# Patient Record
Sex: MALE | Race: WHITE | Employment: OTHER | URBAN - METROPOLITAN AREA
[De-identification: names, ages, dates, MRNs, and addresses within clinical notes are randomized per-mention and may not be internally consistent; named-entity substitution may affect disease eponyms.]

---

## 2017-01-18 ENCOUNTER — OFFICE VISIT (OUTPATIENT)
Dept: INTERNAL MEDICINE | Age: 77
End: 2017-01-18

## 2017-01-18 VITALS
RESPIRATION RATE: 14 BRPM | HEIGHT: 66 IN | DIASTOLIC BLOOD PRESSURE: 82 MMHG | SYSTOLIC BLOOD PRESSURE: 132 MMHG | HEART RATE: 66 BPM | WEIGHT: 221.2 LBS | BODY MASS INDEX: 35.55 KG/M2

## 2017-01-18 DIAGNOSIS — N40.1 BENIGN NON-NODULAR PROSTATIC HYPERPLASIA WITH LOWER URINARY TRACT SYMPTOMS: ICD-10-CM

## 2017-01-18 DIAGNOSIS — I10 ESSENTIAL HYPERTENSION: Primary | ICD-10-CM

## 2017-01-18 DIAGNOSIS — R73.9 HYPERGLYCEMIA: ICD-10-CM

## 2017-01-18 DIAGNOSIS — R07.9 CHEST PAIN, UNSPECIFIED TYPE: ICD-10-CM

## 2017-01-18 DIAGNOSIS — I25.10 ATHEROSCLEROSIS OF NATIVE CORONARY ARTERY OF NATIVE HEART WITHOUT ANGINA PECTORIS: ICD-10-CM

## 2017-01-18 DIAGNOSIS — E78.5 HYPERLIPIDEMIA, UNSPECIFIED HYPERLIPIDEMIA TYPE: ICD-10-CM

## 2017-01-18 DIAGNOSIS — R35.1 NOCTURIA: ICD-10-CM

## 2017-01-18 DIAGNOSIS — E55.9 VITAMIN D DEFICIENCY: ICD-10-CM

## 2017-01-18 DIAGNOSIS — M1A.9XX0 CHRONIC GOUT WITHOUT TOPHUS, UNSPECIFIED CAUSE, UNSPECIFIED SITE: ICD-10-CM

## 2017-01-18 PROCEDURE — 99214 OFFICE O/P EST MOD 30 MIN: CPT | Performed by: INTERNAL MEDICINE

## 2017-01-18 RX ORDER — LISINOPRIL 20 MG/1
20 TABLET ORAL DAILY
Qty: 90 TABLET | Refills: 1 | Status: SHIPPED | OUTPATIENT
Start: 2017-01-18 | End: 2017-08-28 | Stop reason: SDUPTHER

## 2017-01-18 RX ORDER — ISOSORBIDE DINITRATE 30 MG/1
30 TABLET ORAL 2 TIMES DAILY
Qty: 180 TABLET | Refills: 1 | Status: SHIPPED | OUTPATIENT
Start: 2017-01-18 | End: 2017-08-28 | Stop reason: SDUPTHER

## 2017-01-18 RX ORDER — ZOLPIDEM TARTRATE 10 MG/1
10 TABLET ORAL NIGHTLY PRN
Qty: 90 TABLET | Refills: 0 | Status: SHIPPED | OUTPATIENT
Start: 2017-01-18 | End: 2017-12-27 | Stop reason: SDUPTHER

## 2017-01-18 RX ORDER — ALLOPURINOL 300 MG/1
TABLET ORAL
Qty: 90 TABLET | Refills: 1 | Status: SHIPPED | OUTPATIENT
Start: 2017-01-18 | End: 2017-08-28 | Stop reason: SDUPTHER

## 2017-01-18 RX ORDER — HYDROCODONE BITARTRATE AND ACETAMINOPHEN 5; 325 MG/1; MG/1
1 TABLET ORAL EVERY 6 HOURS PRN
Qty: 30 TABLET | Refills: 0 | Status: SHIPPED | OUTPATIENT
Start: 2017-01-18 | End: 2017-05-16 | Stop reason: SDUPTHER

## 2017-01-18 RX ORDER — AMLODIPINE BESYLATE 5 MG/1
TABLET ORAL
Qty: 90 TABLET | Refills: 1 | Status: SHIPPED | OUTPATIENT
Start: 2017-01-18 | End: 2017-06-12 | Stop reason: SDUPTHER

## 2017-01-18 ASSESSMENT — ENCOUNTER SYMPTOMS
EYES NEGATIVE: 1
GASTROINTESTINAL NEGATIVE: 1
ALLERGIC/IMMUNOLOGIC NEGATIVE: 1
RESPIRATORY NEGATIVE: 1

## 2017-05-10 LAB
A/G RATIO: 1.9 (ref 1.1–2.2)
ALBUMIN SERPL-MCNC: 4.4 G/DL (ref 3.4–5)
ALP BLD-CCNC: 50 U/L (ref 40–129)
ALT SERPL-CCNC: 18 U/L (ref 10–40)
ANION GAP SERPL CALCULATED.3IONS-SCNC: 14 MMOL/L (ref 3–16)
AST SERPL-CCNC: 15 U/L (ref 15–37)
BASOPHILS ABSOLUTE: 0 K/UL (ref 0–0.2)
BASOPHILS RELATIVE PERCENT: 0.2 %
BILIRUB SERPL-MCNC: 0.6 MG/DL (ref 0–1)
BUN BLDV-MCNC: 18 MG/DL (ref 7–20)
CALCIUM SERPL-MCNC: 9.3 MG/DL (ref 8.3–10.6)
CHLORIDE BLD-SCNC: 104 MMOL/L (ref 99–110)
CHOLESTEROL, TOTAL: 239 MG/DL (ref 0–199)
CO2: 24 MMOL/L (ref 21–32)
CREAT SERPL-MCNC: 1.2 MG/DL (ref 0.8–1.3)
EOSINOPHILS ABSOLUTE: 0.2 K/UL (ref 0–0.6)
EOSINOPHILS RELATIVE PERCENT: 2.3 %
GFR AFRICAN AMERICAN: >60
GFR NON-AFRICAN AMERICAN: 59
GLOBULIN: 2.3 G/DL
GLUCOSE BLD-MCNC: 98 MG/DL (ref 70–99)
HCT VFR BLD CALC: 46.5 % (ref 40.5–52.5)
HDLC SERPL-MCNC: 35 MG/DL (ref 40–60)
HEMOGLOBIN: 15.1 G/DL (ref 13.5–17.5)
LDL CHOLESTEROL CALCULATED: 161 MG/DL
LYMPHOCYTES ABSOLUTE: 1.2 K/UL (ref 1–5.1)
LYMPHOCYTES RELATIVE PERCENT: 18.2 %
MCH RBC QN AUTO: 28.9 PG (ref 26–34)
MCHC RBC AUTO-ENTMCNC: 32.4 G/DL (ref 31–36)
MCV RBC AUTO: 89 FL (ref 80–100)
MONOCYTES ABSOLUTE: 0.7 K/UL (ref 0–1.3)
MONOCYTES RELATIVE PERCENT: 9.7 %
NEUTROPHILS ABSOLUTE: 4.7 K/UL (ref 1.7–7.7)
NEUTROPHILS RELATIVE PERCENT: 69.6 %
PDW BLD-RTO: 15 % (ref 12.4–15.4)
PLATELET # BLD: 194 K/UL (ref 135–450)
PMV BLD AUTO: 8.9 FL (ref 5–10.5)
POTASSIUM SERPL-SCNC: 5 MMOL/L (ref 3.5–5.1)
PROSTATE SPECIFIC ANTIGEN: 2.07 NG/ML (ref 0–4)
RBC # BLD: 5.22 M/UL (ref 4.2–5.9)
SODIUM BLD-SCNC: 142 MMOL/L (ref 136–145)
TOTAL PROTEIN: 6.7 G/DL (ref 6.4–8.2)
TRIGL SERPL-MCNC: 216 MG/DL (ref 0–150)
TSH SERPL DL<=0.05 MIU/L-ACNC: 2.4 UIU/ML (ref 0.27–4.2)
URIC ACID, SERUM: 8.3 MG/DL (ref 3.5–7.2)
VITAMIN D 25-HYDROXY: 27.1 NG/ML
VLDLC SERPL CALC-MCNC: 43 MG/DL
WBC # BLD: 6.8 K/UL (ref 4–11)

## 2017-05-11 LAB
ESTIMATED AVERAGE GLUCOSE: 119.8 MG/DL
HBA1C MFR BLD: 5.8 %

## 2017-05-15 ENCOUNTER — TELEPHONE (OUTPATIENT)
Dept: INTERNAL MEDICINE | Age: 77
End: 2017-05-15

## 2017-05-16 ENCOUNTER — OFFICE VISIT (OUTPATIENT)
Dept: INTERNAL MEDICINE | Age: 77
End: 2017-05-16

## 2017-05-16 VITALS
HEIGHT: 66 IN | DIASTOLIC BLOOD PRESSURE: 64 MMHG | WEIGHT: 221.2 LBS | SYSTOLIC BLOOD PRESSURE: 124 MMHG | RESPIRATION RATE: 12 BRPM | BODY MASS INDEX: 35.55 KG/M2 | HEART RATE: 62 BPM

## 2017-05-16 DIAGNOSIS — M1A.9XX0 CHRONIC GOUT WITHOUT TOPHUS, UNSPECIFIED CAUSE, UNSPECIFIED SITE: ICD-10-CM

## 2017-05-16 DIAGNOSIS — E78.5 HYPERLIPIDEMIA, UNSPECIFIED HYPERLIPIDEMIA TYPE: ICD-10-CM

## 2017-05-16 DIAGNOSIS — I25.10 ATHEROSCLEROSIS OF NATIVE CORONARY ARTERY OF NATIVE HEART WITHOUT ANGINA PECTORIS: ICD-10-CM

## 2017-05-16 DIAGNOSIS — N40.1 BENIGN NON-NODULAR PROSTATIC HYPERPLASIA WITH LOWER URINARY TRACT SYMPTOMS: ICD-10-CM

## 2017-05-16 DIAGNOSIS — R73.9 HYPERGLYCEMIA: ICD-10-CM

## 2017-05-16 DIAGNOSIS — E55.9 UNSPECIFIED VITAMIN D DEFICIENCY: Primary | ICD-10-CM

## 2017-05-16 PROCEDURE — 99214 OFFICE O/P EST MOD 30 MIN: CPT | Performed by: INTERNAL MEDICINE

## 2017-05-16 RX ORDER — HYDROCODONE BITARTRATE AND ACETAMINOPHEN 5; 325 MG/1; MG/1
1 TABLET ORAL EVERY 6 HOURS PRN
Qty: 30 TABLET | Refills: 0 | Status: SHIPPED | OUTPATIENT
Start: 2017-05-16 | End: 2017-12-27 | Stop reason: SDUPTHER

## 2017-05-16 ASSESSMENT — ENCOUNTER SYMPTOMS
ALLERGIC/IMMUNOLOGIC NEGATIVE: 1
EYES NEGATIVE: 1
RESPIRATORY NEGATIVE: 1
GASTROINTESTINAL NEGATIVE: 1

## 2017-06-13 RX ORDER — AMLODIPINE BESYLATE 5 MG/1
TABLET ORAL
Qty: 90 TABLET | Refills: 0 | Status: SHIPPED | OUTPATIENT
Start: 2017-06-13 | End: 2017-08-28 | Stop reason: SDUPTHER

## 2017-08-18 DIAGNOSIS — E55.9 UNSPECIFIED VITAMIN D DEFICIENCY: ICD-10-CM

## 2017-08-18 DIAGNOSIS — M1A.9XX0 CHRONIC GOUT WITHOUT TOPHUS, UNSPECIFIED CAUSE, UNSPECIFIED SITE: ICD-10-CM

## 2017-08-18 DIAGNOSIS — R73.9 HYPERGLYCEMIA: ICD-10-CM

## 2017-08-18 DIAGNOSIS — N40.1 BENIGN NON-NODULAR PROSTATIC HYPERPLASIA WITH LOWER URINARY TRACT SYMPTOMS: ICD-10-CM

## 2017-08-18 DIAGNOSIS — E78.5 HYPERLIPIDEMIA, UNSPECIFIED HYPERLIPIDEMIA TYPE: ICD-10-CM

## 2017-08-18 DIAGNOSIS — I25.10 ATHEROSCLEROSIS OF NATIVE CORONARY ARTERY OF NATIVE HEART WITHOUT ANGINA PECTORIS: ICD-10-CM

## 2017-08-18 LAB
A/G RATIO: 2 (ref 1.1–2.2)
ALBUMIN SERPL-MCNC: 4.7 G/DL (ref 3.4–5)
ALP BLD-CCNC: 57 U/L (ref 40–129)
ALT SERPL-CCNC: 16 U/L (ref 10–40)
ANION GAP SERPL CALCULATED.3IONS-SCNC: 13 MMOL/L (ref 3–16)
AST SERPL-CCNC: 14 U/L (ref 15–37)
BASOPHILS ABSOLUTE: 0 K/UL (ref 0–0.2)
BASOPHILS RELATIVE PERCENT: 0.2 %
BILIRUB SERPL-MCNC: 0.8 MG/DL (ref 0–1)
BILIRUBIN URINE: NEGATIVE
BLOOD, URINE: NEGATIVE
BUN BLDV-MCNC: 18 MG/DL (ref 7–20)
CALCIUM SERPL-MCNC: 10 MG/DL (ref 8.3–10.6)
CHLORIDE BLD-SCNC: 104 MMOL/L (ref 99–110)
CHOLESTEROL, TOTAL: 163 MG/DL (ref 0–199)
CLARITY: CLEAR
CO2: 27 MMOL/L (ref 21–32)
COLOR: YELLOW
CREAT SERPL-MCNC: 1.3 MG/DL (ref 0.8–1.3)
EOSINOPHILS ABSOLUTE: 0.3 K/UL (ref 0–0.6)
EOSINOPHILS RELATIVE PERCENT: 3.3 %
GFR AFRICAN AMERICAN: >60
GFR NON-AFRICAN AMERICAN: 54
GLOBULIN: 2.4 G/DL
GLUCOSE BLD-MCNC: 99 MG/DL (ref 70–99)
GLUCOSE URINE: NEGATIVE MG/DL
HCT VFR BLD CALC: 48.1 % (ref 40.5–52.5)
HDLC SERPL-MCNC: 37 MG/DL (ref 40–60)
HEMOGLOBIN: 15.6 G/DL (ref 13.5–17.5)
KETONES, URINE: NEGATIVE MG/DL
LDL CHOLESTEROL CALCULATED: 84 MG/DL
LEUKOCYTE ESTERASE, URINE: NEGATIVE
LYMPHOCYTES ABSOLUTE: 1.5 K/UL (ref 1–5.1)
LYMPHOCYTES RELATIVE PERCENT: 18.2 %
MCH RBC QN AUTO: 29.4 PG (ref 26–34)
MCHC RBC AUTO-ENTMCNC: 32.5 G/DL (ref 31–36)
MCV RBC AUTO: 90.4 FL (ref 80–100)
MICROSCOPIC EXAMINATION: NORMAL
MONOCYTES ABSOLUTE: 0.8 K/UL (ref 0–1.3)
MONOCYTES RELATIVE PERCENT: 9.8 %
NEUTROPHILS ABSOLUTE: 5.7 K/UL (ref 1.7–7.7)
NEUTROPHILS RELATIVE PERCENT: 68.5 %
NITRITE, URINE: NEGATIVE
PDW BLD-RTO: 15 % (ref 12.4–15.4)
PH UA: 6.5
PLATELET # BLD: 206 K/UL (ref 135–450)
PMV BLD AUTO: 9.4 FL (ref 5–10.5)
POTASSIUM SERPL-SCNC: 5.7 MMOL/L (ref 3.5–5.1)
PROSTATE SPECIFIC ANTIGEN: 1.61 NG/ML (ref 0–4)
PROTEIN UA: NEGATIVE MG/DL
RBC # BLD: 5.32 M/UL (ref 4.2–5.9)
SODIUM BLD-SCNC: 144 MMOL/L (ref 136–145)
SPECIFIC GRAVITY UA: 1.01
TOTAL PROTEIN: 7.1 G/DL (ref 6.4–8.2)
TRIGL SERPL-MCNC: 210 MG/DL (ref 0–150)
TSH SERPL DL<=0.05 MIU/L-ACNC: 2.94 UIU/ML (ref 0.27–4.2)
URINE TYPE: NORMAL
UROBILINOGEN, URINE: 0.2 E.U./DL
VITAMIN D 25-HYDROXY: 30.9 NG/ML
VLDLC SERPL CALC-MCNC: 42 MG/DL
WBC # BLD: 8.3 K/UL (ref 4–11)

## 2017-08-19 LAB
ESTIMATED AVERAGE GLUCOSE: 119.8 MG/DL
HBA1C MFR BLD: 5.8 %

## 2017-08-28 ENCOUNTER — OFFICE VISIT (OUTPATIENT)
Dept: INTERNAL MEDICINE | Age: 77
End: 2017-08-28

## 2017-08-28 VITALS
SYSTOLIC BLOOD PRESSURE: 138 MMHG | RESPIRATION RATE: 12 BRPM | WEIGHT: 226 LBS | BODY MASS INDEX: 36.32 KG/M2 | HEART RATE: 74 BPM | DIASTOLIC BLOOD PRESSURE: 76 MMHG | HEIGHT: 66 IN

## 2017-08-28 DIAGNOSIS — Z00.00 WELL ADULT EXAM: Primary | ICD-10-CM

## 2017-08-28 DIAGNOSIS — R73.9 HYPERGLYCEMIA: ICD-10-CM

## 2017-08-28 DIAGNOSIS — M79.604 PAIN IN BOTH LOWER EXTREMITIES: ICD-10-CM

## 2017-08-28 DIAGNOSIS — M1A.9XX0 CHRONIC GOUT WITHOUT TOPHUS, UNSPECIFIED CAUSE, UNSPECIFIED SITE: ICD-10-CM

## 2017-08-28 DIAGNOSIS — Z00.00 ROUTINE GENERAL MEDICAL EXAMINATION AT A HEALTH CARE FACILITY: ICD-10-CM

## 2017-08-28 DIAGNOSIS — Z86.010 HX OF COLONIC POLYP: ICD-10-CM

## 2017-08-28 DIAGNOSIS — I25.10 ATHEROSCLEROSIS OF NATIVE CORONARY ARTERY OF NATIVE HEART WITHOUT ANGINA PECTORIS: ICD-10-CM

## 2017-08-28 DIAGNOSIS — R07.9 CHEST PAIN, UNSPECIFIED TYPE: ICD-10-CM

## 2017-08-28 DIAGNOSIS — E78.5 HYPERLIPIDEMIA, UNSPECIFIED HYPERLIPIDEMIA TYPE: ICD-10-CM

## 2017-08-28 DIAGNOSIS — N40.1 BENIGN NON-NODULAR PROSTATIC HYPERPLASIA WITH LOWER URINARY TRACT SYMPTOMS: ICD-10-CM

## 2017-08-28 DIAGNOSIS — I10 ESSENTIAL HYPERTENSION: ICD-10-CM

## 2017-08-28 DIAGNOSIS — M79.605 PAIN IN BOTH LOWER EXTREMITIES: ICD-10-CM

## 2017-08-28 PROCEDURE — G0438 PPPS, INITIAL VISIT: HCPCS | Performed by: INTERNAL MEDICINE

## 2017-08-28 PROCEDURE — 99214 OFFICE O/P EST MOD 30 MIN: CPT | Performed by: INTERNAL MEDICINE

## 2017-08-28 RX ORDER — ISOSORBIDE DINITRATE 30 MG/1
30 TABLET ORAL 2 TIMES DAILY
Qty: 180 TABLET | Refills: 1 | Status: SHIPPED | OUTPATIENT
Start: 2017-08-28 | End: 2018-10-01 | Stop reason: SDUPTHER

## 2017-08-28 RX ORDER — AMLODIPINE BESYLATE 5 MG/1
TABLET ORAL
Qty: 90 TABLET | Refills: 1 | Status: SHIPPED | OUTPATIENT
Start: 2017-08-28 | End: 2021-12-07

## 2017-08-28 RX ORDER — ALLOPURINOL 300 MG/1
TABLET ORAL
Qty: 90 TABLET | Refills: 1 | Status: SHIPPED | OUTPATIENT
Start: 2017-08-28 | End: 2020-01-15 | Stop reason: SDUPTHER

## 2017-08-28 RX ORDER — LISINOPRIL 20 MG/1
20 TABLET ORAL DAILY
Qty: 90 TABLET | Refills: 1 | Status: SHIPPED | OUTPATIENT
Start: 2017-08-28 | End: 2018-04-23 | Stop reason: SDUPTHER

## 2017-08-28 ASSESSMENT — ENCOUNTER SYMPTOMS
ALLERGIC/IMMUNOLOGIC NEGATIVE: 1
GASTROINTESTINAL NEGATIVE: 1
RESPIRATORY NEGATIVE: 1
EYES NEGATIVE: 1

## 2017-08-28 ASSESSMENT — LIFESTYLE VARIABLES
HOW OFTEN DO YOU HAVE SIX OR MORE DRINKS ON ONE OCCASION: 1
HOW OFTEN DO YOU HAVE A DRINK CONTAINING ALCOHOL: 2
AUDIT-C TOTAL SCORE: 3
HOW MANY STANDARD DRINKS CONTAINING ALCOHOL DO YOU HAVE ON A TYPICAL DAY: 0

## 2017-08-28 ASSESSMENT — ANXIETY QUESTIONNAIRES: GAD7 TOTAL SCORE: 2

## 2017-08-28 ASSESSMENT — PATIENT HEALTH QUESTIONNAIRE - PHQ9: SUM OF ALL RESPONSES TO PHQ QUESTIONS 1-9: 0

## 2017-10-23 ENCOUNTER — NURSE ONLY (OUTPATIENT)
Dept: CARDIOLOGY CLINIC | Age: 77
End: 2017-10-23

## 2017-10-23 DIAGNOSIS — R07.9 CHEST PAIN, UNSPECIFIED TYPE: Primary | ICD-10-CM

## 2017-10-23 DIAGNOSIS — I25.10 ATHEROSCLEROSIS OF NATIVE CORONARY ARTERY OF NATIVE HEART WITHOUT ANGINA PECTORIS: Primary | ICD-10-CM

## 2017-10-23 PROCEDURE — 93000 ELECTROCARDIOGRAM COMPLETE: CPT | Performed by: INTERNAL MEDICINE

## 2017-10-23 RX ORDER — NITROGLYCERIN 0.4 MG/1
0.4 TABLET SUBLINGUAL EVERY 5 MIN PRN
Qty: 30 TABLET | Refills: 2 | Status: SHIPPED | OUTPATIENT
Start: 2017-10-23 | End: 2017-12-27 | Stop reason: SDUPTHER

## 2017-10-31 ENCOUNTER — HOSPITAL ENCOUNTER (OUTPATIENT)
Dept: NUCLEAR MEDICINE | Age: 77
Discharge: HOME | End: 2017-10-31

## 2017-10-31 DIAGNOSIS — R07.9 CHEST PAIN, UNSPECIFIED TYPE: ICD-10-CM

## 2017-10-31 DIAGNOSIS — R07.9 CHEST PAIN: ICD-10-CM

## 2017-10-31 LAB
LV EF: 67 %
LVEF MODALITY: NORMAL

## 2017-11-07 ENCOUNTER — OFFICE VISIT (OUTPATIENT)
Dept: CARDIOLOGY CLINIC | Age: 77
End: 2017-11-07

## 2017-11-07 VITALS
SYSTOLIC BLOOD PRESSURE: 128 MMHG | DIASTOLIC BLOOD PRESSURE: 70 MMHG | WEIGHT: 231 LBS | BODY MASS INDEX: 37.28 KG/M2 | HEART RATE: 80 BPM

## 2017-11-07 DIAGNOSIS — I10 ESSENTIAL HYPERTENSION: ICD-10-CM

## 2017-11-07 DIAGNOSIS — I25.10 ATHEROSCLEROSIS OF NATIVE CORONARY ARTERY OF NATIVE HEART WITHOUT ANGINA PECTORIS: Primary | ICD-10-CM

## 2017-11-07 PROCEDURE — 99213 OFFICE O/P EST LOW 20 MIN: CPT | Performed by: INTERNAL MEDICINE

## 2017-11-10 NOTE — PROGRESS NOTES
[address]     Outpatient Follow Up Note    Subjective:   CHIEF COMPLAINT / HPI:  Follow Up secondary to CAD    Ranjana Herrera is 69 y.o. male who presents today for a follow up. He is doing well without complaints of chest pain or dyspnea. Past Medical History:    Past Medical History:   Diagnosis Date    Contact dermatitis and other eczema, due to unspecified cause     Coronary atherosclerosis of unspecified type of vessel, native or graft     Cardio- Dr. Madi Plunkett Gout, unspecified     Hypertension     Hypertrophy of prostate without urinary obstruction and other lower urinary tract symptoms (LUTS)     Other and unspecified hyperlipidemia     Other chest pain     Pain in limb     Foot Pain    Peripheral vascular complications     Screening PSA (prostate specific antigen) 2/19/2009    1.5    Stool blood 7/21/2008    [facility] Negative     Social History:    History   Smoking Status    Former Smoker    Packs/day: 1.50    Types: Cigarettes    Quit date: 1/1/2003   Smokeless Tobacco    Former User     Current Medications:  Current Outpatient Prescriptions on File Prior to Visit   Medication Sig Dispense Refill    nitroGLYCERIN (NITROSTAT) 0.4 MG SL tablet Place 1 tablet under the tongue every 5 minutes as needed for Chest pain 30 tablet 2    lisinopril (PRINIVIL;ZESTRIL) 20 MG tablet Take 1 tablet by mouth daily 90 tablet 1    isosorbide dinitrate (ISORDIL) 30 MG tablet Take 1 tablet by mouth 2 times daily 180 tablet 1    metoprolol tartrate (LOPRESSOR) 25 MG tablet TAKE ONE TABLET BY MOUTH TWICE A  tablet 1    allopurinol (ZYLOPRIM) 300 MG tablet TAKE ONE TABLET BY MOUTH DAILY 90 tablet 1    amLODIPine (NORVASC) 5 MG tablet TAKE THREE TABLETS BY MOUTH DAILY 90 tablet 1    HYDROcodone-acetaminophen (NORCO) 5-325 MG per tablet Take 1 tablet by mouth every 6 hours as needed for Pain .  30 tablet 0    zolpidem (AMBIEN) 10 MG tablet Take 1 tablet by mouth nightly as needed for Sleep 90 tablet 0    phenyleph-promethazine-codeine (PROMETHAZINE VC/CODEINE) 5-6.25-10 MG/5ML SYRP syrup Take 5 mLs by mouth every 4 hours as needed 150 mL 1    COLCRYS 0.6 MG tablet TAKE ONE TABLET BY MOUTH DAILY 15 tablet 2    atorvastatin (LIPITOR) 40 MG tablet Take 1 tablet by mouth daily. 90 tablet 2    vitamin D (CHOLECALCIFEROL) 1000 UNIT TABS tablet Take 2,000 Int'l Units by mouth daily.  aspirin 325 MG tablet Take 325 mg by mouth daily.  finasteride (PROSCAR) 5 MG tablet Take 5 mg by mouth daily.  clotrimazole-betamethasone (LOTRISONE) cream Apply  topically 2 times daily.  fish oil-omega-3 fatty acids 1000 MG capsule Take 2 g by mouth daily.  naftifine (NAFTIN) 1 % cream Apply  topically daily. No current facility-administered medications on file prior to visit. REVIEW OF SYSTEMS:    CONSTITUTIONAL: No major weight gain or loss, fatigue, weakness, night sweats or fever. HEENT: No new vision difficulties or ringing in the ears. RESPIRATORY: No new SOB, PND, orthopnea or cough. CARDIOVASCULAR: See HPI  GI: No nausea, vomiting, diarrhea, constipation, abdominal pain or changes in bowel habits. : No urinary frequency, urgency, incontinence hematuria or dysuria. SKIN: No cyanosis or skin lesions. MUSCULOSKELETAL: No new muscle or joint pain. NEUROLOGICAL: No syncope or TIA-like symptoms. PSYCHIATRIC: No anxiety, pain, insomnia or depression    Objective:   PHYSICAL EXAM:        VITALS:    /70   Pulse 80   Wt 231 lb (104.8 kg)   BMI 37.28 kg/m²   CONSTITUTIONAL: Cooperative, no apparent distress, and appears well nourished / developed  NEUROLOGIC:  Awake and orientated to person, place and time. PSYCH: Calm affect. SKIN: Warm and dry. HEENT: Sclera non-icteric, normocephalic, neck supple, no elevation of JVP, normal carotid pulses with no bruits and thyroid normal size.   LUNGS:  No increased work of breathing and clear to auscultation, no crackles or wheezing  CARDIOVASCULAR:  Regular rate and rhythm with no murmurs, gallops, rubs, or abnormal heart sounds. The apical impulses not displaced  The carotid upstroke is normal in amplitude and contour without delay or bruit  JVP is not elevated  ABDOMEN:  Normal bowel sounds, non-distended and non-tender to palpation  EXT: No edema, no calf tenderness. Pulses are present bilaterally.     DATA:    Lab Results   Component Value Date    ALT 16 08/18/2017    AST 14 (L) 08/18/2017    ALKPHOS 57 08/18/2017    BILITOT 0.8 08/18/2017     Lab Results   Component Value Date    CREATININE 1.3 08/18/2017    BUN 18 08/18/2017     08/18/2017    K 5.7 (H) 08/18/2017     08/18/2017    CO2 27 08/18/2017     Lab Results   Component Value Date    TSH 2.94 08/18/2017     Lab Results   Component Value Date    WBC 8.3 08/18/2017    HGB 15.6 08/18/2017    HCT 48.1 08/18/2017    MCV 90.4 08/18/2017     08/18/2017     No components found for: CHLPL  Lab Results   Component Value Date    TRIG 210 (H) 08/18/2017    TRIG 216 (H) 05/10/2017    TRIG 153 (H) 05/06/2016     Lab Results   Component Value Date    HDL 37 (L) 08/18/2017    HDL 35 (L) 05/10/2017    HDL 37 (L) 05/06/2016     Lab Results   Component Value Date    LDLCALC 84 08/18/2017    LDLCALC 161 (H) 05/10/2017    LDLCALC 143 (H) 05/06/2016     Lab Results   Component Value Date    LABVLDL 42 08/18/2017    LABVLDL 43 05/10/2017    LABVLDL 31 05/06/2016         Assessment:     Patient Active Problem List   Diagnosis    Gout    Coronary atherosclerosis    Benign non-nodular prostatic hyperplasia with lower urinary tract symptoms    Pain in limb    Hyperglycemia    Hypertension    Hx of colonic polyp    Chest pain    Well adult exam    Hyperlipidemia    URI (upper respiratory infection)   CAD native vessels without angina due to HTN hyperlidiemia prior smoker  Now with recurrent chest pain  Recent stress test was without ischemia    Hypertension

## 2017-12-27 ENCOUNTER — OFFICE VISIT (OUTPATIENT)
Dept: INTERNAL MEDICINE | Age: 77
End: 2017-12-27

## 2017-12-27 ENCOUNTER — HOSPITAL ENCOUNTER (OUTPATIENT)
Dept: OTHER | Age: 77
Discharge: HOME | End: 2017-12-27
Attending: INTERNAL MEDICINE | Admitting: INTERNAL MEDICINE

## 2017-12-27 VITALS
SYSTOLIC BLOOD PRESSURE: 128 MMHG | BODY MASS INDEX: 36.19 KG/M2 | DIASTOLIC BLOOD PRESSURE: 78 MMHG | RESPIRATION RATE: 16 BRPM | HEART RATE: 78 BPM | WEIGHT: 225.2 LBS | HEIGHT: 66 IN

## 2017-12-27 DIAGNOSIS — E78.5 HYPERLIPIDEMIA, UNSPECIFIED HYPERLIPIDEMIA TYPE: ICD-10-CM

## 2017-12-27 DIAGNOSIS — I10 ESSENTIAL HYPERTENSION: ICD-10-CM

## 2017-12-27 DIAGNOSIS — N40.1 BENIGN NON-NODULAR PROSTATIC HYPERPLASIA WITH LOWER URINARY TRACT SYMPTOMS: ICD-10-CM

## 2017-12-27 DIAGNOSIS — M1A.9XX0 CHRONIC GOUT WITHOUT TOPHUS, UNSPECIFIED CAUSE, UNSPECIFIED SITE: ICD-10-CM

## 2017-12-27 DIAGNOSIS — I25.10 ATHEROSCLEROSIS OF NATIVE CORONARY ARTERY OF NATIVE HEART WITHOUT ANGINA PECTORIS: ICD-10-CM

## 2017-12-27 DIAGNOSIS — I10 ESSENTIAL HYPERTENSION: Primary | ICD-10-CM

## 2017-12-27 PROCEDURE — 99214 OFFICE O/P EST MOD 30 MIN: CPT | Performed by: INTERNAL MEDICINE

## 2017-12-27 RX ORDER — ZOLPIDEM TARTRATE 10 MG/1
10 TABLET ORAL NIGHTLY PRN
Qty: 90 TABLET | Refills: 0 | Status: SHIPPED | OUTPATIENT
Start: 2017-12-27 | End: 2019-01-04 | Stop reason: SDUPTHER

## 2017-12-27 RX ORDER — NITROGLYCERIN 0.4 MG/1
0.4 TABLET SUBLINGUAL EVERY 5 MIN PRN
Qty: 30 TABLET | Refills: 2 | Status: SHIPPED | OUTPATIENT
Start: 2017-12-27 | End: 2019-01-04 | Stop reason: SDUPTHER

## 2017-12-27 RX ORDER — HYDROCODONE BITARTRATE AND ACETAMINOPHEN 5; 325 MG/1; MG/1
1 TABLET ORAL EVERY 6 HOURS PRN
Qty: 30 TABLET | Refills: 0 | Status: SHIPPED | OUTPATIENT
Start: 2017-12-27 | End: 2019-01-04 | Stop reason: SDUPTHER

## 2017-12-27 RX ORDER — HYDROCHLOROTHIAZIDE 25 MG/1
25 TABLET ORAL DAILY
Qty: 30 TABLET | Refills: 3 | Status: SHIPPED | OUTPATIENT
Start: 2017-12-27 | End: 2018-12-26 | Stop reason: SDUPTHER

## 2017-12-27 ASSESSMENT — ENCOUNTER SYMPTOMS
ORTHOPNEA: 1
SHORTNESS OF BREATH: 1
EYES NEGATIVE: 1
ALLERGIC/IMMUNOLOGIC NEGATIVE: 1
GASTROINTESTINAL NEGATIVE: 1

## 2017-12-27 NOTE — ASSESSMENT & PLAN NOTE
S/p CABG as noted since then can only sleep in chair needs new one for a lift chair but c/o of orthopnie and ESCOBAR and SOB when he leans over had neg stress ECHO last mo will send for CXR

## 2017-12-27 NOTE — PROGRESS NOTES
Subjective:      Patient ID: Loree Lundborg is a 69 y.o. male. Here today for follow up of chronic problems see problem list, no new c/o feels good other than orthopnia at nite and some ESCOBAR as noted had neg ECHO 11/17       Hypertension   This is a chronic problem. The current episode started more than 1 year ago. The problem is unchanged. The problem is controlled. Associated symptoms include anxiety, chest pain, orthopnea, palpitations and shortness of breath. Pertinent negatives include no peripheral edema. Risk factors for coronary artery disease include dyslipidemia, obesity, male gender and sedentary lifestyle. Past treatments include ACE inhibitors, beta blockers and lifestyle changes. The current treatment provides moderate improvement. There are no compliance problems. Hypertensive end-organ damage includes CVA and heart failure (? but had neg ECHO 11/17). Hyperlipidemia   This is a chronic problem. The current episode started more than 1 year ago. The problem is controlled. Recent lipid tests were reviewed and are normal. Exacerbating diseases include obesity. Factors aggravating his hyperlipidemia include beta blockers. Associated symptoms include chest pain and shortness of breath. Current antihyperlipidemic treatment includes diet change and statins. The current treatment provides significant improvement of lipids. There are no compliance problems. Risk factors for coronary artery disease include diabetes mellitus, dyslipidemia, hypertension and obesity. Hyperglycemia   Associated symptoms include arthralgias, chest pain and numbness (R foot  ). Review of Systems   Constitutional: Positive for unexpected weight change (up a few test ). Eyes: Negative. Respiratory: Positive for shortness of breath. Cardiovascular: Positive for chest pain, palpitations, orthopnea and leg swelling. Gastrointestinal: Negative. Endocrine: Negative. Genitourinary: Positive for frequency.

## 2017-12-27 NOTE — ASSESSMENT & PLAN NOTE
Stable no change in meds return in 3 mo.  Labs pending - has some edema and orthopnea neg ECHO as noted send for CXR and add HCTZ then consider lift chair

## 2017-12-28 DIAGNOSIS — M1A.9XX0 CHRONIC GOUT WITHOUT TOPHUS, UNSPECIFIED CAUSE, UNSPECIFIED SITE: ICD-10-CM

## 2017-12-28 DIAGNOSIS — E78.5 HYPERLIPIDEMIA, UNSPECIFIED HYPERLIPIDEMIA TYPE: ICD-10-CM

## 2017-12-28 DIAGNOSIS — I25.10 ATHEROSCLEROSIS OF NATIVE CORONARY ARTERY OF NATIVE HEART WITHOUT ANGINA PECTORIS: ICD-10-CM

## 2017-12-28 DIAGNOSIS — N40.1 BENIGN NON-NODULAR PROSTATIC HYPERPLASIA WITH LOWER URINARY TRACT SYMPTOMS: ICD-10-CM

## 2017-12-28 DIAGNOSIS — I10 ESSENTIAL HYPERTENSION: ICD-10-CM

## 2017-12-28 LAB
A/G RATIO: 2 (ref 1.1–2.2)
ALBUMIN SERPL-MCNC: 4.7 G/DL (ref 3.4–5)
ALP BLD-CCNC: 54 U/L (ref 40–129)
ALT SERPL-CCNC: 17 U/L (ref 10–40)
ANION GAP SERPL CALCULATED.3IONS-SCNC: 11 MMOL/L (ref 3–16)
AST SERPL-CCNC: 16 U/L (ref 15–37)
BASOPHILS ABSOLUTE: 0 K/UL (ref 0–0.2)
BASOPHILS RELATIVE PERCENT: 0.1 %
BILIRUB SERPL-MCNC: 0.7 MG/DL (ref 0–1)
BUN BLDV-MCNC: 20 MG/DL (ref 7–20)
CALCIUM SERPL-MCNC: 9.8 MG/DL (ref 8.3–10.6)
CHLORIDE BLD-SCNC: 103 MMOL/L (ref 99–110)
CHOLESTEROL, TOTAL: 235 MG/DL (ref 0–199)
CO2: 26 MMOL/L (ref 21–32)
CREAT SERPL-MCNC: 1.1 MG/DL (ref 0.8–1.3)
EOSINOPHILS ABSOLUTE: 0.1 K/UL (ref 0–0.6)
EOSINOPHILS RELATIVE PERCENT: 2.1 %
GFR AFRICAN AMERICAN: >60
GFR NON-AFRICAN AMERICAN: >60
GLOBULIN: 2.4 G/DL
GLUCOSE BLD-MCNC: 100 MG/DL (ref 70–99)
HCT VFR BLD CALC: 46.5 % (ref 40.5–52.5)
HDLC SERPL-MCNC: 37 MG/DL (ref 40–60)
HEMOGLOBIN: 15.1 G/DL (ref 13.5–17.5)
LDL CHOLESTEROL CALCULATED: 163 MG/DL
LYMPHOCYTES ABSOLUTE: 1.3 K/UL (ref 1–5.1)
LYMPHOCYTES RELATIVE PERCENT: 18.3 %
MCH RBC QN AUTO: 28.4 PG (ref 26–34)
MCHC RBC AUTO-ENTMCNC: 32.4 G/DL (ref 31–36)
MCV RBC AUTO: 87.7 FL (ref 80–100)
MONOCYTES ABSOLUTE: 0.7 K/UL (ref 0–1.3)
MONOCYTES RELATIVE PERCENT: 10.8 %
NEUTROPHILS ABSOLUTE: 4.7 K/UL (ref 1.7–7.7)
NEUTROPHILS RELATIVE PERCENT: 68.7 %
PDW BLD-RTO: 15 % (ref 12.4–15.4)
PLATELET # BLD: 214 K/UL (ref 135–450)
PMV BLD AUTO: 9.2 FL (ref 5–10.5)
POTASSIUM SERPL-SCNC: 5.4 MMOL/L (ref 3.5–5.1)
RBC # BLD: 5.31 M/UL (ref 4.2–5.9)
SODIUM BLD-SCNC: 140 MMOL/L (ref 136–145)
TOTAL PROTEIN: 7.1 G/DL (ref 6.4–8.2)
TRIGL SERPL-MCNC: 176 MG/DL (ref 0–150)
TSH REFLEX: 2.13 UIU/ML (ref 0.27–4.2)
URIC ACID, SERUM: 7.6 MG/DL (ref 3.5–7.2)
VLDLC SERPL CALC-MCNC: 35 MG/DL
WBC # BLD: 6.8 K/UL (ref 4–11)

## 2018-04-23 ENCOUNTER — OFFICE VISIT (OUTPATIENT)
Dept: INTERNAL MEDICINE | Age: 78
End: 2018-04-23

## 2018-04-23 VITALS
BODY MASS INDEX: 35.9 KG/M2 | SYSTOLIC BLOOD PRESSURE: 136 MMHG | WEIGHT: 223.4 LBS | DIASTOLIC BLOOD PRESSURE: 70 MMHG | RESPIRATION RATE: 14 BRPM | HEART RATE: 78 BPM | HEIGHT: 66 IN

## 2018-04-23 DIAGNOSIS — I10 ESSENTIAL HYPERTENSION: Primary | ICD-10-CM

## 2018-04-23 DIAGNOSIS — I25.10 ATHEROSCLEROSIS OF NATIVE CORONARY ARTERY OF NATIVE HEART WITHOUT ANGINA PECTORIS: ICD-10-CM

## 2018-04-23 DIAGNOSIS — N40.1 BENIGN NON-NODULAR PROSTATIC HYPERPLASIA WITH LOWER URINARY TRACT SYMPTOMS: ICD-10-CM

## 2018-04-23 DIAGNOSIS — R73.9 HYPERGLYCEMIA: ICD-10-CM

## 2018-04-23 DIAGNOSIS — M1A.9XX0 CHRONIC GOUT WITHOUT TOPHUS, UNSPECIFIED CAUSE, UNSPECIFIED SITE: ICD-10-CM

## 2018-04-23 DIAGNOSIS — E78.5 HYPERLIPIDEMIA, UNSPECIFIED HYPERLIPIDEMIA TYPE: ICD-10-CM

## 2018-04-23 PROCEDURE — 99214 OFFICE O/P EST MOD 30 MIN: CPT | Performed by: INTERNAL MEDICINE

## 2018-04-23 RX ORDER — LISINOPRIL 20 MG/1
20 TABLET ORAL DAILY
Qty: 90 TABLET | Refills: 1 | Status: SHIPPED | OUTPATIENT
Start: 2018-04-23 | End: 2018-12-17 | Stop reason: SDUPTHER

## 2018-04-23 ASSESSMENT — ENCOUNTER SYMPTOMS
EYES NEGATIVE: 1
SHORTNESS OF BREATH: 1
GASTROINTESTINAL NEGATIVE: 1
ALLERGIC/IMMUNOLOGIC NEGATIVE: 1

## 2018-05-21 ENCOUNTER — TELEPHONE (OUTPATIENT)
Dept: INTERNAL MEDICINE | Age: 78
End: 2018-05-21

## 2018-05-25 ENCOUNTER — OFFICE VISIT (OUTPATIENT)
Dept: INTERNAL MEDICINE | Age: 78
End: 2018-05-25

## 2018-05-25 VITALS
WEIGHT: 219.2 LBS | RESPIRATION RATE: 16 BRPM | HEART RATE: 68 BPM | HEIGHT: 66 IN | SYSTOLIC BLOOD PRESSURE: 124 MMHG | DIASTOLIC BLOOD PRESSURE: 78 MMHG | BODY MASS INDEX: 35.23 KG/M2

## 2018-05-25 DIAGNOSIS — K59.09 OTHER CONSTIPATION: ICD-10-CM

## 2018-05-25 DIAGNOSIS — I10 ESSENTIAL HYPERTENSION: Primary | ICD-10-CM

## 2018-05-25 DIAGNOSIS — Z86.010 HX OF COLONIC POLYP: ICD-10-CM

## 2018-05-25 DIAGNOSIS — R73.9 HYPERGLYCEMIA: ICD-10-CM

## 2018-05-25 DIAGNOSIS — N40.1 BENIGN NON-NODULAR PROSTATIC HYPERPLASIA WITH LOWER URINARY TRACT SYMPTOMS: ICD-10-CM

## 2018-05-25 DIAGNOSIS — M1A.9XX0 CHRONIC GOUT WITHOUT TOPHUS, UNSPECIFIED CAUSE, UNSPECIFIED SITE: ICD-10-CM

## 2018-05-25 DIAGNOSIS — I25.10 ATHEROSCLEROSIS OF NATIVE CORONARY ARTERY OF NATIVE HEART WITHOUT ANGINA PECTORIS: ICD-10-CM

## 2018-05-25 PROCEDURE — 99214 OFFICE O/P EST MOD 30 MIN: CPT | Performed by: INTERNAL MEDICINE

## 2018-05-25 ASSESSMENT — ENCOUNTER SYMPTOMS
DIARRHEA: 0
VOMITING: 0
ABDOMINAL PAIN: 0
CONSTIPATION: 1
ALLERGIC/IMMUNOLOGIC NEGATIVE: 1
SHORTNESS OF BREATH: 1
FLATUS: 0
NAUSEA: 0
RECTAL PAIN: 1
BACK PAIN: 0
EYES NEGATIVE: 1

## 2018-05-29 DIAGNOSIS — N40.1 BENIGN NON-NODULAR PROSTATIC HYPERPLASIA WITH LOWER URINARY TRACT SYMPTOMS: ICD-10-CM

## 2018-05-29 DIAGNOSIS — M1A.9XX0 CHRONIC GOUT WITHOUT TOPHUS, UNSPECIFIED CAUSE, UNSPECIFIED SITE: ICD-10-CM

## 2018-05-29 DIAGNOSIS — I25.10 ATHEROSCLEROSIS OF NATIVE CORONARY ARTERY OF NATIVE HEART WITHOUT ANGINA PECTORIS: ICD-10-CM

## 2018-05-29 DIAGNOSIS — I10 ESSENTIAL HYPERTENSION: ICD-10-CM

## 2018-05-29 DIAGNOSIS — R73.9 HYPERGLYCEMIA: ICD-10-CM

## 2018-05-29 DIAGNOSIS — K59.09 OTHER CONSTIPATION: ICD-10-CM

## 2018-05-29 DIAGNOSIS — Z86.010 HX OF COLONIC POLYP: ICD-10-CM

## 2018-05-29 LAB
A/G RATIO: 2 (ref 1.1–2.2)
ALBUMIN SERPL-MCNC: 4.3 G/DL (ref 3.4–5)
ALP BLD-CCNC: 49 U/L (ref 40–129)
ALT SERPL-CCNC: 11 U/L (ref 10–40)
ANION GAP SERPL CALCULATED.3IONS-SCNC: 13 MMOL/L (ref 3–16)
AST SERPL-CCNC: 11 U/L (ref 15–37)
BASOPHILS ABSOLUTE: 0 K/UL (ref 0–0.2)
BASOPHILS RELATIVE PERCENT: 0.3 %
BILIRUB SERPL-MCNC: 0.4 MG/DL (ref 0–1)
BUN BLDV-MCNC: 14 MG/DL (ref 7–20)
CALCIUM SERPL-MCNC: 9.1 MG/DL (ref 8.3–10.6)
CHLORIDE BLD-SCNC: 105 MMOL/L (ref 99–110)
CHOLESTEROL, TOTAL: 212 MG/DL (ref 0–199)
CO2: 24 MMOL/L (ref 21–32)
CREAT SERPL-MCNC: 1 MG/DL (ref 0.8–1.3)
EOSINOPHILS ABSOLUTE: 0.1 K/UL (ref 0–0.6)
EOSINOPHILS RELATIVE PERCENT: 1.9 %
GFR AFRICAN AMERICAN: >60
GFR NON-AFRICAN AMERICAN: >60
GLOBULIN: 2.1 G/DL
GLUCOSE BLD-MCNC: 94 MG/DL (ref 70–99)
HCT VFR BLD CALC: 44.3 % (ref 40.5–52.5)
HDLC SERPL-MCNC: 33 MG/DL (ref 40–60)
HEMOGLOBIN: 14.7 G/DL (ref 13.5–17.5)
LDL CHOLESTEROL CALCULATED: 132 MG/DL
LYMPHOCYTES ABSOLUTE: 1.3 K/UL (ref 1–5.1)
LYMPHOCYTES RELATIVE PERCENT: 19.4 %
MCH RBC QN AUTO: 28.9 PG (ref 26–34)
MCHC RBC AUTO-ENTMCNC: 33.2 G/DL (ref 31–36)
MCV RBC AUTO: 86.9 FL (ref 80–100)
MONOCYTES ABSOLUTE: 0.6 K/UL (ref 0–1.3)
MONOCYTES RELATIVE PERCENT: 9.5 %
NEUTROPHILS ABSOLUTE: 4.7 K/UL (ref 1.7–7.7)
NEUTROPHILS RELATIVE PERCENT: 68.9 %
PDW BLD-RTO: 14.2 % (ref 12.4–15.4)
PLATELET # BLD: 194 K/UL (ref 135–450)
PMV BLD AUTO: 8.7 FL (ref 5–10.5)
POTASSIUM SERPL-SCNC: 4.9 MMOL/L (ref 3.5–5.1)
RBC # BLD: 5.09 M/UL (ref 4.2–5.9)
SODIUM BLD-SCNC: 142 MMOL/L (ref 136–145)
TOTAL PROTEIN: 6.4 G/DL (ref 6.4–8.2)
TRIGL SERPL-MCNC: 236 MG/DL (ref 0–150)
TSH REFLEX: 2.11 UIU/ML (ref 0.27–4.2)
URIC ACID, SERUM: 7.1 MG/DL (ref 3.5–7.2)
VLDLC SERPL CALC-MCNC: 47 MG/DL
WBC # BLD: 6.8 K/UL (ref 4–11)

## 2018-05-30 LAB
ESTIMATED AVERAGE GLUCOSE: 128.4 MG/DL
HBA1C MFR BLD: 6.1 %

## 2018-06-05 ENCOUNTER — OFFICE VISIT (OUTPATIENT)
Dept: CARDIOLOGY CLINIC | Age: 78
End: 2018-06-05

## 2018-06-05 VITALS
SYSTOLIC BLOOD PRESSURE: 124 MMHG | HEART RATE: 80 BPM | DIASTOLIC BLOOD PRESSURE: 80 MMHG | WEIGHT: 220 LBS | BODY MASS INDEX: 35.51 KG/M2

## 2018-06-05 DIAGNOSIS — I10 ESSENTIAL HYPERTENSION: ICD-10-CM

## 2018-06-05 DIAGNOSIS — I25.10 CAD IN NATIVE ARTERY: Primary | ICD-10-CM

## 2018-06-05 DIAGNOSIS — E78.5 HYPERLIPIDEMIA, UNSPECIFIED HYPERLIPIDEMIA TYPE: ICD-10-CM

## 2018-06-05 DIAGNOSIS — Z95.1 HX OF CABG: ICD-10-CM

## 2018-06-05 PROCEDURE — 99214 OFFICE O/P EST MOD 30 MIN: CPT | Performed by: INTERNAL MEDICINE

## 2018-06-05 ASSESSMENT — ENCOUNTER SYMPTOMS
ABDOMINAL DISTENTION: 0
COUGH: 0
CHOKING: 0
CHEST TIGHTNESS: 0
SHORTNESS OF BREATH: 0
APNEA: 0

## 2018-10-01 ENCOUNTER — OFFICE VISIT (OUTPATIENT)
Dept: INTERNAL MEDICINE CLINIC | Age: 78
End: 2018-10-01
Payer: COMMERCIAL

## 2018-10-01 VITALS
RESPIRATION RATE: 14 BRPM | DIASTOLIC BLOOD PRESSURE: 84 MMHG | SYSTOLIC BLOOD PRESSURE: 126 MMHG | HEIGHT: 66 IN | WEIGHT: 216.6 LBS | HEART RATE: 70 BPM | BODY MASS INDEX: 34.81 KG/M2

## 2018-10-01 DIAGNOSIS — Z00.00 ROUTINE GENERAL MEDICAL EXAMINATION AT A HEALTH CARE FACILITY: ICD-10-CM

## 2018-10-01 DIAGNOSIS — R73.9 HYPERGLYCEMIA: ICD-10-CM

## 2018-10-01 DIAGNOSIS — N40.1 BENIGN NON-NODULAR PROSTATIC HYPERPLASIA WITH LOWER URINARY TRACT SYMPTOMS: ICD-10-CM

## 2018-10-01 DIAGNOSIS — E78.49 OTHER HYPERLIPIDEMIA: ICD-10-CM

## 2018-10-01 DIAGNOSIS — Z00.00 WELL ADULT EXAM: Primary | ICD-10-CM

## 2018-10-01 DIAGNOSIS — M1A.9XX0 CHRONIC GOUT WITHOUT TOPHUS, UNSPECIFIED CAUSE, UNSPECIFIED SITE: ICD-10-CM

## 2018-10-01 DIAGNOSIS — I10 ESSENTIAL HYPERTENSION: ICD-10-CM

## 2018-10-01 DIAGNOSIS — K59.09 OTHER CONSTIPATION: ICD-10-CM

## 2018-10-01 DIAGNOSIS — I25.10 ATHEROSCLEROSIS OF NATIVE CORONARY ARTERY OF NATIVE HEART WITHOUT ANGINA PECTORIS: ICD-10-CM

## 2018-10-01 DIAGNOSIS — Z86.010 HX OF COLONIC POLYP: ICD-10-CM

## 2018-10-01 DIAGNOSIS — R07.1 CHEST PAIN ON BREATHING: ICD-10-CM

## 2018-10-01 PROCEDURE — G0439 PPPS, SUBSEQ VISIT: HCPCS | Performed by: INTERNAL MEDICINE

## 2018-10-01 PROCEDURE — 99214 OFFICE O/P EST MOD 30 MIN: CPT | Performed by: INTERNAL MEDICINE

## 2018-10-01 RX ORDER — ISOSORBIDE DINITRATE 30 MG/1
30 TABLET ORAL 2 TIMES DAILY
Qty: 180 TABLET | Refills: 1 | Status: SHIPPED | OUTPATIENT
Start: 2018-10-01 | End: 2019-01-04 | Stop reason: SDUPTHER

## 2018-10-01 ASSESSMENT — ENCOUNTER SYMPTOMS
EYES NEGATIVE: 1
RESPIRATORY NEGATIVE: 1
ALLERGIC/IMMUNOLOGIC NEGATIVE: 1
CONSTIPATION: 1

## 2018-10-01 ASSESSMENT — LIFESTYLE VARIABLES
HOW OFTEN DO YOU HAVE A DRINK CONTAINING ALCOHOL: 2
HOW MANY STANDARD DRINKS CONTAINING ALCOHOL DO YOU HAVE ON A TYPICAL DAY: 0
AUDIT-C TOTAL SCORE: 2
HOW OFTEN DO YOU HAVE SIX OR MORE DRINKS ON ONE OCCASION: 0

## 2018-10-01 ASSESSMENT — PATIENT HEALTH QUESTIONNAIRE - PHQ9
SUM OF ALL RESPONSES TO PHQ QUESTIONS 1-9: 0
SUM OF ALL RESPONSES TO PHQ QUESTIONS 1-9: 0

## 2018-10-01 ASSESSMENT — ANXIETY QUESTIONNAIRES: GAD7 TOTAL SCORE: 0

## 2018-10-01 NOTE — PROGRESS NOTES
no repeat needed 2nd to age     Hyperglycemia  Diet controled     Hyperlipidemia  Stable continue current meds and return in 3 mo. Hypertension  Stable continue current meds and return in 3 mo. Other constipation  Improved     Well adult exam  Within normal limits for age- cont to work no ADL issues,immunizations up to date refuses Flu , no depression ,no cognitive impairment  Colonoscopy up to date repeat 2018-polyp no repeat needed 2nd to age    Eye exam up to date  Exercises as tolerated    No living will but does not want resuscitation   Findings and recommendations discussed with Pt      Vince Syed  1940    Allergies   Allergen Reactions    Sulfa Antibiotics      Current Outpatient Prescriptions   Medication Sig Dispense Refill    Polyethylene Glycol 3350 (MIRALAX PO) Take by mouth      lisinopril (PRINIVIL;ZESTRIL) 20 MG tablet Take 1 tablet by mouth daily 90 tablet 1    zolpidem (AMBIEN) 10 MG tablet Take 1 tablet by mouth nightly as needed for Sleep . 90 tablet 0    HYDROcodone-acetaminophen (NORCO) 5-325 MG per tablet Take 1 tablet by mouth every 6 hours as needed for Pain . Earliest Fill Date: 12/27/17 30 tablet 0    nitroGLYCERIN (NITROSTAT) 0.4 MG SL tablet Place 1 tablet under the tongue every 5 minutes as needed for Chest pain 30 tablet 2    hydrochlorothiazide (HYDRODIURIL) 25 MG tablet Take 1 tablet by mouth daily 30 tablet 3    isosorbide dinitrate (ISORDIL) 30 MG tablet Take 1 tablet by mouth 2 times daily 180 tablet 1    metoprolol tartrate (LOPRESSOR) 25 MG tablet TAKE ONE TABLET BY MOUTH TWICE A  tablet 1    allopurinol (ZYLOPRIM) 300 MG tablet TAKE ONE TABLET BY MOUTH DAILY 90 tablet 1    amLODIPine (NORVASC) 5 MG tablet TAKE THREE TABLETS BY MOUTH DAILY 90 tablet 1    COLCRYS 0.6 MG tablet TAKE ONE TABLET BY MOUTH DAILY 15 tablet 2    atorvastatin (LIPITOR) 40 MG tablet Take 1 tablet by mouth daily.  90 tablet 2    vitamin D (CHOLECALCIFEROL) 1000 UNIT

## 2018-10-24 ENCOUNTER — TELEPHONE (OUTPATIENT)
Dept: INTERNAL MEDICINE CLINIC | Age: 78
End: 2018-10-24

## 2018-11-12 ENCOUNTER — OFFICE VISIT (OUTPATIENT)
Dept: INTERNAL MEDICINE CLINIC | Age: 78
End: 2018-11-12
Payer: COMMERCIAL

## 2018-11-12 VITALS
WEIGHT: 215.6 LBS | SYSTOLIC BLOOD PRESSURE: 122 MMHG | BODY MASS INDEX: 34.65 KG/M2 | RESPIRATION RATE: 14 BRPM | HEIGHT: 66 IN | DIASTOLIC BLOOD PRESSURE: 70 MMHG | HEART RATE: 78 BPM

## 2018-11-12 DIAGNOSIS — H25.9 SENILE CATARACT OF LEFT EYE, UNSPECIFIED AGE-RELATED CATARACT TYPE: ICD-10-CM

## 2018-11-12 DIAGNOSIS — I25.10 ATHEROSCLEROSIS OF NATIVE CORONARY ARTERY OF NATIVE HEART WITHOUT ANGINA PECTORIS: ICD-10-CM

## 2018-11-12 DIAGNOSIS — E78.49 OTHER HYPERLIPIDEMIA: ICD-10-CM

## 2018-11-12 DIAGNOSIS — N40.1 BENIGN NON-NODULAR PROSTATIC HYPERPLASIA WITH LOWER URINARY TRACT SYMPTOMS: ICD-10-CM

## 2018-11-12 DIAGNOSIS — Z01.818 PREOP EXAMINATION: ICD-10-CM

## 2018-11-12 DIAGNOSIS — M1A.9XX0 CHRONIC GOUT WITHOUT TOPHUS, UNSPECIFIED CAUSE, UNSPECIFIED SITE: ICD-10-CM

## 2018-11-12 DIAGNOSIS — I10 ESSENTIAL HYPERTENSION: ICD-10-CM

## 2018-11-12 PROCEDURE — 99215 OFFICE O/P EST HI 40 MIN: CPT | Performed by: INTERNAL MEDICINE

## 2018-11-12 ASSESSMENT — ENCOUNTER SYMPTOMS
CONSTIPATION: 1
RESPIRATORY NEGATIVE: 1
EYES NEGATIVE: 1
ALLERGIC/IMMUNOLOGIC NEGATIVE: 1

## 2018-11-12 NOTE — LETTER
[facility] [address]  [address] [facility] [employer], [address] [place] [id number]  Phone: [phone]  Fax: [phone]    Sanchez Bro MD        November 12, 2018       Patient: Marciano Albarado   MR Number: [id number]   YOB: 1940   Date of Visit: 11/12/2018       Dear Dr. Shyam Payne: Thank you for the request for consultation for Marciano Albarado to me for the evaluation for preop . Below are the relevant portions of my assessment and plan of care. No notes on file       If you have questions, please do not hesitate to call me. I look forward to following Harjeet along with you.     Sincerely,        Dilcia Blanco MD     providers:  Robert Parker [address]

## 2018-11-12 NOTE — PROGRESS NOTES
Subjective:      Patient ID: Shirley Smith is a 69 y.o. male. HPI  Here today for pre op H&P for the following procedure for removal of L cataract as per Dr Mayo Pappas   Allergen Reactions    Sulfa Antibiotics        Current Outpatient Prescriptions   Medication Sig Dispense Refill    isosorbide dinitrate (ISORDIL) 30 MG tablet Take 1 tablet by mouth 2 times daily 180 tablet 1    Polyethylene Glycol 3350 (MIRALAX PO) Take by mouth      lisinopril (PRINIVIL;ZESTRIL) 20 MG tablet Take 1 tablet by mouth daily 90 tablet 1    zolpidem (AMBIEN) 10 MG tablet Take 1 tablet by mouth nightly as needed for Sleep . 90 tablet 0    HYDROcodone-acetaminophen (NORCO) 5-325 MG per tablet Take 1 tablet by mouth every 6 hours as needed for Pain . Earliest Fill Date: 12/27/17 30 tablet 0    nitroGLYCERIN (NITROSTAT) 0.4 MG SL tablet Place 1 tablet under the tongue every 5 minutes as needed for Chest pain 30 tablet 2    hydrochlorothiazide (HYDRODIURIL) 25 MG tablet Take 1 tablet by mouth daily 30 tablet 3    metoprolol tartrate (LOPRESSOR) 25 MG tablet TAKE ONE TABLET BY MOUTH TWICE A  tablet 1    allopurinol (ZYLOPRIM) 300 MG tablet TAKE ONE TABLET BY MOUTH DAILY 90 tablet 1    amLODIPine (NORVASC) 5 MG tablet TAKE THREE TABLETS BY MOUTH DAILY 90 tablet 1    COLCRYS 0.6 MG tablet TAKE ONE TABLET BY MOUTH DAILY 15 tablet 2    atorvastatin (LIPITOR) 40 MG tablet Take 1 tablet by mouth daily. 90 tablet 2    vitamin D (CHOLECALCIFEROL) 1000 UNIT TABS tablet Take 2,000 Int'l Units by mouth daily.  aspirin 325 MG tablet Take 325 mg by mouth daily.  finasteride (PROSCAR) 5 MG tablet Take 5 mg by mouth daily.  clotrimazole-betamethasone (LOTRISONE) cream Apply  topically 2 times daily.  fish oil-omega-3 fatty acids 1000 MG capsule Take 2 g by mouth daily.  naftifine (NAFTIN) 1 % cream Apply  topically daily. No current facility-administered medications for this visit.

## 2018-12-05 ENCOUNTER — OFFICE VISIT (OUTPATIENT)
Dept: CARDIOLOGY CLINIC | Age: 78
End: 2018-12-05
Payer: COMMERCIAL

## 2018-12-05 VITALS
SYSTOLIC BLOOD PRESSURE: 120 MMHG | WEIGHT: 216 LBS | DIASTOLIC BLOOD PRESSURE: 70 MMHG | HEART RATE: 72 BPM | BODY MASS INDEX: 34.86 KG/M2

## 2018-12-05 DIAGNOSIS — E78.5 HYPERLIPIDEMIA, UNSPECIFIED HYPERLIPIDEMIA TYPE: ICD-10-CM

## 2018-12-05 DIAGNOSIS — Z95.1 HX OF CABG: ICD-10-CM

## 2018-12-05 DIAGNOSIS — I10 ESSENTIAL HYPERTENSION: ICD-10-CM

## 2018-12-05 DIAGNOSIS — I25.10 CAD IN NATIVE ARTERY: Primary | ICD-10-CM

## 2018-12-05 PROCEDURE — 99214 OFFICE O/P EST MOD 30 MIN: CPT | Performed by: INTERNAL MEDICINE

## 2018-12-05 ASSESSMENT — ENCOUNTER SYMPTOMS
APNEA: 0
ABDOMINAL DISTENTION: 0
CHOKING: 0
CHEST TIGHTNESS: 0
SHORTNESS OF BREATH: 0
COUGH: 0

## 2018-12-12 PROBLEM — Z01.818 PREOP EXAMINATION: Status: RESOLVED | Noted: 2018-11-12 | Resolved: 2018-12-12

## 2018-12-17 RX ORDER — LISINOPRIL 20 MG/1
TABLET ORAL
Qty: 90 TABLET | Refills: 1 | Status: SHIPPED | OUTPATIENT
Start: 2018-12-17 | End: 2019-07-08 | Stop reason: SDUPTHER

## 2018-12-27 RX ORDER — HYDROCHLOROTHIAZIDE 25 MG/1
TABLET ORAL
Qty: 30 TABLET | Refills: 2 | Status: SHIPPED | OUTPATIENT
Start: 2018-12-27 | End: 2019-01-04 | Stop reason: SDUPTHER

## 2019-01-04 ENCOUNTER — OFFICE VISIT (OUTPATIENT)
Dept: INTERNAL MEDICINE CLINIC | Age: 79
End: 2019-01-04
Payer: COMMERCIAL

## 2019-01-04 VITALS
SYSTOLIC BLOOD PRESSURE: 110 MMHG | WEIGHT: 210 LBS | HEIGHT: 66 IN | HEART RATE: 66 BPM | BODY MASS INDEX: 33.75 KG/M2 | DIASTOLIC BLOOD PRESSURE: 70 MMHG | RESPIRATION RATE: 14 BRPM

## 2019-01-04 DIAGNOSIS — E78.5 HYPERLIPIDEMIA, UNSPECIFIED HYPERLIPIDEMIA TYPE: ICD-10-CM

## 2019-01-04 DIAGNOSIS — N40.1 BENIGN NON-NODULAR PROSTATIC HYPERPLASIA WITH LOWER URINARY TRACT SYMPTOMS: ICD-10-CM

## 2019-01-04 DIAGNOSIS — D22.9 MULTIPLE ATYPICAL SKIN MOLES: ICD-10-CM

## 2019-01-04 DIAGNOSIS — M79.605 PAIN IN BOTH LOWER EXTREMITIES: ICD-10-CM

## 2019-01-04 DIAGNOSIS — I25.10 ATHEROSCLEROSIS OF NATIVE CORONARY ARTERY OF NATIVE HEART WITHOUT ANGINA PECTORIS: ICD-10-CM

## 2019-01-04 DIAGNOSIS — I10 ESSENTIAL HYPERTENSION: ICD-10-CM

## 2019-01-04 DIAGNOSIS — R73.9 HYPERGLYCEMIA: ICD-10-CM

## 2019-01-04 DIAGNOSIS — M79.604 PAIN IN BOTH LOWER EXTREMITIES: ICD-10-CM

## 2019-01-04 DIAGNOSIS — F51.01 PRIMARY INSOMNIA: Primary | ICD-10-CM

## 2019-01-04 DIAGNOSIS — M1A.9XX0 CHRONIC GOUT WITHOUT TOPHUS, UNSPECIFIED CAUSE, UNSPECIFIED SITE: ICD-10-CM

## 2019-01-04 PROCEDURE — 99214 OFFICE O/P EST MOD 30 MIN: CPT | Performed by: INTERNAL MEDICINE

## 2019-01-04 RX ORDER — HYDROCHLOROTHIAZIDE 25 MG/1
TABLET ORAL
Qty: 30 TABLET | Refills: 2 | Status: SHIPPED | OUTPATIENT
Start: 2019-01-04 | End: 2019-07-08 | Stop reason: SDUPTHER

## 2019-01-04 RX ORDER — ZOLPIDEM TARTRATE 10 MG/1
10 TABLET ORAL NIGHTLY PRN
Qty: 90 TABLET | Refills: 0 | Status: SHIPPED | OUTPATIENT
Start: 2019-01-04 | End: 2020-02-04 | Stop reason: SDUPTHER

## 2019-01-04 RX ORDER — ISOSORBIDE DINITRATE 30 MG/1
30 TABLET ORAL 2 TIMES DAILY
Qty: 180 TABLET | Refills: 1 | Status: SHIPPED | OUTPATIENT
Start: 2019-01-04 | End: 2019-06-17 | Stop reason: ALTCHOICE

## 2019-01-04 RX ORDER — HYDROCODONE BITARTRATE AND ACETAMINOPHEN 5; 325 MG/1; MG/1
1 TABLET ORAL EVERY 6 HOURS PRN
Qty: 30 TABLET | Refills: 0 | Status: SHIPPED | OUTPATIENT
Start: 2019-01-04 | End: 2019-07-08 | Stop reason: SDUPTHER

## 2019-01-04 RX ORDER — NITROGLYCERIN 0.4 MG/1
0.4 TABLET SUBLINGUAL EVERY 5 MIN PRN
Qty: 30 TABLET | Refills: 2 | Status: SHIPPED | OUTPATIENT
Start: 2019-01-04 | End: 2020-07-08 | Stop reason: SDUPTHER

## 2019-01-04 ASSESSMENT — ENCOUNTER SYMPTOMS
RESPIRATORY NEGATIVE: 1
EYES NEGATIVE: 1
ALLERGIC/IMMUNOLOGIC NEGATIVE: 1
CONSTIPATION: 1

## 2019-04-08 ENCOUNTER — OFFICE VISIT (OUTPATIENT)
Dept: INTERNAL MEDICINE CLINIC | Age: 79
End: 2019-04-08
Payer: COMMERCIAL

## 2019-04-08 VITALS
HEIGHT: 66 IN | DIASTOLIC BLOOD PRESSURE: 70 MMHG | WEIGHT: 207.4 LBS | RESPIRATION RATE: 12 BRPM | BODY MASS INDEX: 33.33 KG/M2 | HEART RATE: 60 BPM | SYSTOLIC BLOOD PRESSURE: 138 MMHG

## 2019-04-08 DIAGNOSIS — I25.111 ATHEROSCLEROSIS OF NATIVE CORONARY ARTERY OF NATIVE HEART WITH ANGINA PECTORIS WITH DOCUMENTED SPASM (HCC): ICD-10-CM

## 2019-04-08 DIAGNOSIS — I10 ESSENTIAL HYPERTENSION: Primary | ICD-10-CM

## 2019-04-08 DIAGNOSIS — E78.5 HYPERLIPIDEMIA, UNSPECIFIED HYPERLIPIDEMIA TYPE: ICD-10-CM

## 2019-04-08 DIAGNOSIS — R42 VERTIGO: ICD-10-CM

## 2019-04-08 DIAGNOSIS — N40.1 BENIGN NON-NODULAR PROSTATIC HYPERPLASIA WITH LOWER URINARY TRACT SYMPTOMS: ICD-10-CM

## 2019-04-08 DIAGNOSIS — M1A.9XX0 CHRONIC GOUT WITHOUT TOPHUS, UNSPECIFIED CAUSE, UNSPECIFIED SITE: ICD-10-CM

## 2019-04-08 DIAGNOSIS — K59.09 OTHER CONSTIPATION: ICD-10-CM

## 2019-04-08 DIAGNOSIS — R73.9 HYPERGLYCEMIA: ICD-10-CM

## 2019-04-08 PROCEDURE — 99214 OFFICE O/P EST MOD 30 MIN: CPT | Performed by: INTERNAL MEDICINE

## 2019-04-08 ASSESSMENT — ENCOUNTER SYMPTOMS
RESPIRATORY NEGATIVE: 1
EYES NEGATIVE: 1
ALLERGIC/IMMUNOLOGIC NEGATIVE: 1
CONSTIPATION: 1
VOMITING: 0
BACK PAIN: 1

## 2019-04-08 ASSESSMENT — PATIENT HEALTH QUESTIONNAIRE - PHQ9
1. LITTLE INTEREST OR PLEASURE IN DOING THINGS: 0
2. FEELING DOWN, DEPRESSED OR HOPELESS: 0
SUM OF ALL RESPONSES TO PHQ QUESTIONS 1-9: 0
SUM OF ALL RESPONSES TO PHQ9 QUESTIONS 1 & 2: 0
SUM OF ALL RESPONSES TO PHQ QUESTIONS 1-9: 0

## 2019-04-08 NOTE — PROGRESS NOTES
Subjective:      Patient ID: Hernandez Smith is a 69 y.o. male. HPI  Here today for follow up of chronic problems as per HPI and as problems listed under assessment and plan,no new c/o feels good has mult issues regarding balance and lightjeadness and fear of falling -has leg pain and back pain wants to try to get help from [facility] wife already has help from [facility] - symptoms getting worse over the last few mo     Dizziness   This is a chronic problem. The current episode started more than 1 month ago. The problem occurs intermittently. The problem has been waxing and waning. Associated symptoms include arthralgias and chest pain (occ .sx ). Pertinent negatives include no numbness, vomiting or weakness. The symptoms are aggravated by walking, twisting and exertion. He has tried position changes and lying down for the symptoms. The treatment provided mild relief. Hypertension   This is a chronic problem. The current episode started more than 1 year ago. The problem is unchanged. The problem is controlled. Associated symptoms include chest pain (occ .sx ) and palpitations. There are no associated agents to hypertension. Risk factors for coronary artery disease include dyslipidemia, male gender and obesity (known CAD ). Past treatments include direct vasodilators, diuretics, lifestyle changes, ACE inhibitors and beta blockers. The current treatment provides significant improvement. There are no compliance problems. Hypertensive end-organ damage includes angina and CAD/MI. Hyperlipidemia   This is a chronic problem. The current episode started more than 1 year ago. The problem is controlled. Recent lipid tests were reviewed and are low. Factors aggravating his hyperlipidemia include beta blockers. Associated symptoms include chest pain (occ .sx ). Current antihyperlipidemic treatment includes diet change, exercise and statins. The current treatment provides significant improvement of lipids.  There are no compliance problems. Risk factors for coronary artery disease include dyslipidemia, hypertension, male sex and obesity. Allergies   Allergen Reactions    Sulfa Antibiotics        Current Outpatient Medications   Medication Sig Dispense Refill    nitroGLYCERIN (NITROSTAT) 0.4 MG SL tablet Place 1 tablet under the tongue every 5 minutes as needed for Chest pain 30 tablet 2    isosorbide dinitrate (ISORDIL) 30 MG tablet Take 1 tablet by mouth 2 times daily 180 tablet 1    hydrochlorothiazide (HYDRODIURIL) 25 MG tablet TAKE ONE TABLET BY MOUTH DAILY 30 tablet 2    lisinopril (PRINIVIL;ZESTRIL) 20 MG tablet TAKE ONE TABLET BY MOUTH DAILY 90 tablet 1    Polyethylene Glycol 3350 (MIRALAX PO) Take by mouth      metoprolol tartrate (LOPRESSOR) 25 MG tablet TAKE ONE TABLET BY MOUTH TWICE A  tablet 1    allopurinol (ZYLOPRIM) 300 MG tablet TAKE ONE TABLET BY MOUTH DAILY 90 tablet 1    amLODIPine (NORVASC) 5 MG tablet TAKE THREE TABLETS BY MOUTH DAILY 90 tablet 1    COLCRYS 0.6 MG tablet TAKE ONE TABLET BY MOUTH DAILY 15 tablet 2    atorvastatin (LIPITOR) 40 MG tablet Take 1 tablet by mouth daily. 90 tablet 2    vitamin D (CHOLECALCIFEROL) 1000 UNIT TABS tablet Take 2,000 Int'l Units by mouth daily.  aspirin 325 MG tablet Take 325 mg by mouth daily.  finasteride (PROSCAR) 5 MG tablet Take 5 mg by mouth daily.  clotrimazole-betamethasone (LOTRISONE) cream Apply  topically 2 times daily.  fish oil-omega-3 fatty acids 1000 MG capsule Take 2 g by mouth daily.  naftifine (NAFTIN) 1 % cream Apply  topically daily. No current facility-administered medications for this visit.         Past Medical History:   Diagnosis Date    Contact dermatitis and other eczema, due to unspecified cause     Coronary atherosclerosis of unspecified type of vessel, native or graft     Cardio- Dr. Roosevelt Pearson Gout, unspecified     Hypertension     Hypertrophy of prostate without urinary obstruction and other lower urinary tract symptoms (LUTS)     Other and unspecified hyperlipidemia     Other chest pain     Pain in limb     Foot Pain    Peripheral vascular complications     Screening PSA (prostate specific antigen) 2009    1.5    Stool blood 2008    [facility] Negative       Family History   Problem Relation Age of Onset    Coronary Art Dis Father        Past Surgical History:   Procedure Laterality Date    CARDIAC SURGERY      4 vessel cabg Dr. Norah Apley y    COLONOSCOPY  07    Dr. Sara Callejas 2012    COLONOSCOPY  5.23.13    Brenda Barrera MD polyp repeat     COLONOSCOPY  2018    Dr. Jing Vega polyp removed no repeat necessary   [address] 2003    Dr. Svetlana Monge History     Socioeconomic History    Marital status:      Spouse name: Not on file    Number of children: Not on file    Years of education: Not on file    Highest education level: Not on file   Occupational History    Not on file   Social Needs    Financial resource strain: Not on file    Food insecurity:     Worry: Not on file     Inability: Not on file    Transportation needs:     Medical: Not on file     Non-medical: Not on file   Tobacco Use    Smoking status: Former Smoker     Packs/day: 1.50     Years: 50.00     Pack years: 75.00     Types: Cigarettes     Last attempt to quit: 2003     Years since quittin.2    Smokeless tobacco: Former User   Substance and Sexual Activity    Alcohol use: Yes     Comment: Seldom    Drug use: No    Sexual activity: Yes     Partners: Female   Lifestyle    Physical activity:     Days per week: Not on file     Minutes per session: Not on file    Stress: Not on file   Relationships    Social connections:     Talks on phone: Not on file     Gets together: Not on file     Attends Hoahaoism service: Not on file     Active member of club or organization: Not on file     Attends meetings of clubs or organizations: Not on file Relationship status: Not on file    Intimate partner violence:     Fear of current or ex partner: Not on file     Emotionally abused: Not on file     Physically abused: Not on file     Forced sexual activity: Not on file   Other Topics Concern    Not on file   Social History Narrative    Not on file       Review of Systems  Review of Systems   Constitutional: Positive for unexpected weight change (down a few # ). HENT: Negative. Eyes: Negative. Respiratory: Negative. Cardiovascular: Positive for chest pain (occ .sx ) and palpitations. Gastrointestinal: Positive for constipation (improved ). Negative for vomiting. Constipation improved    Endocrine: Negative. Genitourinary: Positive for frequency. BPH as per Dr Moises Carlos    Musculoskeletal: Positive for arthralgias and back pain. Skin: Negative. Mult moles  ? ? Changes will refer to derm    Allergic/Immunologic: Negative. Neurological: Positive for dizziness and light-headedness. Negative for tremors, weakness and numbness. Hematological: Negative. Psychiatric/Behavioral: Positive for sleep disturbance. Objective:   Physical Exam:  Physical Exam   Constitutional: He is oriented to person, place, and time. He appears well-developed and well-nourished. HENT:   Head: Normocephalic and atraumatic. Nose: Nose normal.   Mouth/Throat: Oropharynx is clear and moist.   Eyes: Pupils are equal, round, and reactive to light. Conjunctivae and EOM are normal.   L Cataract as per Dr Woodward Marker    Neck: Normal range of motion. Neck supple. No tracheal deviation present. No thyromegaly present. Cardiovascular: Normal rate, regular rhythm, normal heart sounds and intact distal pulses. No murmur heard. Pulmonary/Chest: Effort normal and breath sounds normal.   Abdominal: Soft. Bowel sounds are normal.   Genitourinary:   Genitourinary Comments: Per Dr Moises Carlos    Musculoskeletal: Normal range of motion.    Neurological: He is alert

## 2019-04-11 DIAGNOSIS — K59.09 OTHER CONSTIPATION: ICD-10-CM

## 2019-04-11 DIAGNOSIS — M1A.9XX0 CHRONIC GOUT WITHOUT TOPHUS, UNSPECIFIED CAUSE, UNSPECIFIED SITE: ICD-10-CM

## 2019-04-11 DIAGNOSIS — R42 VERTIGO: ICD-10-CM

## 2019-04-11 DIAGNOSIS — D22.9 MULTIPLE ATYPICAL SKIN MOLES: ICD-10-CM

## 2019-04-11 DIAGNOSIS — R73.9 HYPERGLYCEMIA: ICD-10-CM

## 2019-04-11 DIAGNOSIS — I25.10 ATHEROSCLEROSIS OF NATIVE CORONARY ARTERY OF NATIVE HEART WITHOUT ANGINA PECTORIS: ICD-10-CM

## 2019-04-11 DIAGNOSIS — I25.111 ATHEROSCLEROSIS OF NATIVE CORONARY ARTERY OF NATIVE HEART WITH ANGINA PECTORIS WITH DOCUMENTED SPASM (HCC): ICD-10-CM

## 2019-04-11 DIAGNOSIS — I10 ESSENTIAL HYPERTENSION: ICD-10-CM

## 2019-04-11 DIAGNOSIS — E78.5 HYPERLIPIDEMIA, UNSPECIFIED HYPERLIPIDEMIA TYPE: ICD-10-CM

## 2019-04-11 DIAGNOSIS — N40.1 BENIGN NON-NODULAR PROSTATIC HYPERPLASIA WITH LOWER URINARY TRACT SYMPTOMS: ICD-10-CM

## 2019-04-11 LAB
A/G RATIO: 1.7 (ref 1.1–2.2)
ALBUMIN SERPL-MCNC: 4 G/DL (ref 3.4–5)
ALP BLD-CCNC: 63 U/L (ref 40–129)
ALT SERPL-CCNC: 27 U/L (ref 10–40)
ANION GAP SERPL CALCULATED.3IONS-SCNC: 11 MMOL/L (ref 3–16)
AST SERPL-CCNC: 21 U/L (ref 15–37)
BASOPHILS ABSOLUTE: 0 K/UL (ref 0–0.2)
BASOPHILS RELATIVE PERCENT: 0.2 %
BILIRUB SERPL-MCNC: 0.6 MG/DL (ref 0–1)
BUN BLDV-MCNC: 19 MG/DL (ref 7–20)
CALCIUM SERPL-MCNC: 9.3 MG/DL (ref 8.3–10.6)
CHLORIDE BLD-SCNC: 107 MMOL/L (ref 99–110)
CHOLESTEROL, TOTAL: 212 MG/DL (ref 0–199)
CO2: 25 MMOL/L (ref 21–32)
CREAT SERPL-MCNC: 1.3 MG/DL (ref 0.8–1.3)
EOSINOPHILS ABSOLUTE: 0.2 K/UL (ref 0–0.6)
EOSINOPHILS RELATIVE PERCENT: 2.9 %
GFR AFRICAN AMERICAN: >60
GFR NON-AFRICAN AMERICAN: 53
GLOBULIN: 2.3 G/DL
GLUCOSE BLD-MCNC: 102 MG/DL (ref 70–99)
HCT VFR BLD CALC: 44.7 % (ref 40.5–52.5)
HDLC SERPL-MCNC: 40 MG/DL (ref 40–60)
HEMOGLOBIN: 14.4 G/DL (ref 13.5–17.5)
LDL CHOLESTEROL CALCULATED: 147 MG/DL
LYMPHOCYTES ABSOLUTE: 1 K/UL (ref 1–5.1)
LYMPHOCYTES RELATIVE PERCENT: 17.5 %
MCH RBC QN AUTO: 28.7 PG (ref 26–34)
MCHC RBC AUTO-ENTMCNC: 32.3 G/DL (ref 31–36)
MCV RBC AUTO: 88.9 FL (ref 80–100)
MONOCYTES ABSOLUTE: 0.6 K/UL (ref 0–1.3)
MONOCYTES RELATIVE PERCENT: 10.4 %
NEUTROPHILS ABSOLUTE: 4.1 K/UL (ref 1.7–7.7)
NEUTROPHILS RELATIVE PERCENT: 69 %
PDW BLD-RTO: 15 % (ref 12.4–15.4)
PLATELET # BLD: 178 K/UL (ref 135–450)
PMV BLD AUTO: 9.4 FL (ref 5–10.5)
POTASSIUM SERPL-SCNC: 5.4 MMOL/L (ref 3.5–5.1)
RBC # BLD: 5.03 M/UL (ref 4.2–5.9)
SODIUM BLD-SCNC: 143 MMOL/L (ref 136–145)
TOTAL PROTEIN: 6.3 G/DL (ref 6.4–8.2)
TRIGL SERPL-MCNC: 124 MG/DL (ref 0–150)
TSH REFLEX: 2.16 UIU/ML (ref 0.27–4.2)
URIC ACID, SERUM: 8.7 MG/DL (ref 3.5–7.2)
VLDLC SERPL CALC-MCNC: 25 MG/DL
WBC # BLD: 6 K/UL (ref 4–11)

## 2019-04-12 DIAGNOSIS — Z01.89 ROUTINE LAB DRAW: ICD-10-CM

## 2019-04-12 DIAGNOSIS — I10 HYPERTENSION, UNSPECIFIED TYPE: ICD-10-CM

## 2019-04-12 DIAGNOSIS — M1A.0710 IDIOPATHIC CHRONIC GOUT OF RIGHT FOOT WITHOUT TOPHUS: ICD-10-CM

## 2019-04-12 DIAGNOSIS — E78.49 OTHER HYPERLIPIDEMIA: Primary | ICD-10-CM

## 2019-04-12 DIAGNOSIS — E03.9 HYPOTHYROIDISM, UNSPECIFIED TYPE: ICD-10-CM

## 2019-04-12 DIAGNOSIS — E11.9 TYPE 2 DIABETES MELLITUS WITHOUT COMPLICATION, WITHOUT LONG-TERM CURRENT USE OF INSULIN (HCC): ICD-10-CM

## 2019-04-12 LAB
ESTIMATED AVERAGE GLUCOSE: 116.9 MG/DL
HBA1C MFR BLD: 5.7 %

## 2019-04-12 RX ORDER — ATORVASTATIN CALCIUM 80 MG/1
80 TABLET, FILM COATED ORAL DAILY
Qty: 30 TABLET | Refills: 3 | Status: SHIPPED | OUTPATIENT
Start: 2019-04-12 | End: 2020-01-17 | Stop reason: SDUPTHER

## 2019-06-17 ENCOUNTER — OFFICE VISIT (OUTPATIENT)
Dept: CARDIOLOGY CLINIC | Age: 79
End: 2019-06-17
Payer: COMMERCIAL

## 2019-06-17 VITALS
BODY MASS INDEX: 33.09 KG/M2 | SYSTOLIC BLOOD PRESSURE: 110 MMHG | HEART RATE: 68 BPM | DIASTOLIC BLOOD PRESSURE: 70 MMHG | WEIGHT: 205 LBS

## 2019-06-17 DIAGNOSIS — R06.02 SOB (SHORTNESS OF BREATH): ICD-10-CM

## 2019-06-17 DIAGNOSIS — R07.9 CHEST PAIN, UNSPECIFIED TYPE: Primary | ICD-10-CM

## 2019-06-17 DIAGNOSIS — I10 ESSENTIAL HYPERTENSION: ICD-10-CM

## 2019-06-17 DIAGNOSIS — Z95.1 HX OF CABG: ICD-10-CM

## 2019-06-17 DIAGNOSIS — I25.10 CAD IN NATIVE ARTERY: ICD-10-CM

## 2019-06-17 DIAGNOSIS — E78.5 HYPERLIPIDEMIA, UNSPECIFIED HYPERLIPIDEMIA TYPE: ICD-10-CM

## 2019-06-17 PROCEDURE — 99214 OFFICE O/P EST MOD 30 MIN: CPT | Performed by: INTERNAL MEDICINE

## 2019-06-17 RX ORDER — ISOSORBIDE MONONITRATE 60 MG/1
60 TABLET, EXTENDED RELEASE ORAL DAILY
Qty: 180 TABLET | Refills: 3 | Status: SHIPPED | OUTPATIENT
Start: 2019-06-17 | End: 2020-07-08 | Stop reason: SDUPTHER

## 2019-06-17 ASSESSMENT — ENCOUNTER SYMPTOMS
CHOKING: 0
CHEST TIGHTNESS: 0
ABDOMINAL DISTENTION: 0
SHORTNESS OF BREATH: 0
APNEA: 0
COUGH: 0

## 2019-06-17 NOTE — PROGRESS NOTES
Years: 50.00     Pack years: 75.00     Types: Cigarettes     Last attempt to quit: 2003     Years since quittin.4    Smokeless tobacco: Former User   Substance and Sexual Activity    Alcohol use: Yes     Comment: Seldom    Drug use: No    Sexual activity: Yes     Partners: Female   Lifestyle    Physical activity:     Days per week: Not on file     Minutes per session: Not on file    Stress: Not on file   Relationships    Social connections:     Talks on phone: Not on file     Gets together: Not on file     Attends Sikhism service: Not on file     Active member of club or organization: Not on file     Attends meetings of clubs or organizations: Not on file     Relationship status: Not on file    Intimate partner violence:     Fear of current or ex partner: Not on file     Emotionally abused: Not on file     Physically abused: Not on file     Forced sexual activity: Not on file   Other Topics Concern    Not on file   Social History Narrative    Not on file     FH reviewed, Denies FH cardiac issues. Vitals:    19 1535   BP: 110/70   Pulse: 68     Wt 216    Review of Systems   Constitutional: Negative for activity change and fatigue. Respiratory: Negative for apnea, cough, choking, chest tightness and shortness of breath. Cardiovascular: Negative for chest pain, palpitations and leg swelling. No PND or orthopnea. No tachycardia. Gastrointestinal: Negative for abdominal distention. Musculoskeletal: Negative for myalgias. Neurological: Negative for dizziness, syncope and light-headedness. Psychiatric/Behavioral: Negative for agitation, behavioral problems and confusion. All other systems reviewed and are negative. Objective:   Physical Exam   Constitutional: He is oriented to person, place, and time. He appears well-developed and well-nourished. No distress. HENT:   Head: Normocephalic and atraumatic.    Eyes: Conjunctivae and EOM are normal. Right eye exhibits

## 2019-06-21 ENCOUNTER — HOSPITAL ENCOUNTER (OUTPATIENT)
Dept: NON INVASIVE DIAGNOSTICS | Age: 79
Discharge: HOME | End: 2019-06-21
Payer: COMMERCIAL

## 2019-06-21 LAB
LV EF: 61 %
LVEF MODALITY: NORMAL

## 2019-06-21 PROCEDURE — 2580000003 HC RX 258: Performed by: INTERNAL MEDICINE

## 2019-06-21 PROCEDURE — 93017 CV STRESS TEST TRACING ONLY: CPT

## 2019-06-21 PROCEDURE — A9502 TC99M TETROFOSMIN: HCPCS | Performed by: INTERNAL MEDICINE

## 2019-06-21 PROCEDURE — 3430000000 HC RX DIAGNOSTIC RADIOPHARMACEUTICAL: Performed by: INTERNAL MEDICINE

## 2019-06-21 RX ORDER — SODIUM CHLORIDE 0.9 % (FLUSH) 0.9 %
10 SYRINGE (ML) INJECTION PRN
Status: DISCONTINUED | OUTPATIENT
Start: 2019-06-21 | End: 2019-06-22 | Stop reason: HOSPADM

## 2019-06-21 RX ADMIN — Medication 10 ML: at 08:34

## 2019-06-21 RX ADMIN — TETROFOSMIN 10 MILLICURIE: 1.38 INJECTION, POWDER, LYOPHILIZED, FOR SOLUTION INTRAVENOUS at 08:34

## 2019-06-28 ENCOUNTER — OFFICE VISIT (OUTPATIENT)
Dept: CARDIOLOGY CLINIC | Age: 79
End: 2019-06-28
Payer: COMMERCIAL

## 2019-06-28 VITALS
DIASTOLIC BLOOD PRESSURE: 70 MMHG | HEART RATE: 70 BPM | BODY MASS INDEX: 33.73 KG/M2 | SYSTOLIC BLOOD PRESSURE: 124 MMHG | WEIGHT: 209 LBS

## 2019-06-28 DIAGNOSIS — E78.5 HYPERLIPIDEMIA, UNSPECIFIED HYPERLIPIDEMIA TYPE: ICD-10-CM

## 2019-06-28 DIAGNOSIS — I10 ESSENTIAL HYPERTENSION: ICD-10-CM

## 2019-06-28 DIAGNOSIS — I25.10 CAD IN NATIVE ARTERY: Primary | ICD-10-CM

## 2019-06-28 DIAGNOSIS — Z95.1 HX OF CABG: ICD-10-CM

## 2019-06-28 PROCEDURE — 99214 OFFICE O/P EST MOD 30 MIN: CPT | Performed by: INTERNAL MEDICINE

## 2019-06-28 ASSESSMENT — ENCOUNTER SYMPTOMS
COUGH: 0
CHOKING: 0
APNEA: 0
ABDOMINAL DISTENTION: 0
CHEST TIGHTNESS: 0
SHORTNESS OF BREATH: 0

## 2019-07-08 ENCOUNTER — OFFICE VISIT (OUTPATIENT)
Dept: INTERNAL MEDICINE CLINIC | Age: 79
End: 2019-07-08
Payer: COMMERCIAL

## 2019-07-08 VITALS
RESPIRATION RATE: 16 BRPM | HEIGHT: 66 IN | DIASTOLIC BLOOD PRESSURE: 78 MMHG | BODY MASS INDEX: 33.3 KG/M2 | WEIGHT: 207.2 LBS | HEART RATE: 70 BPM | SYSTOLIC BLOOD PRESSURE: 118 MMHG

## 2019-07-08 DIAGNOSIS — I10 ESSENTIAL HYPERTENSION: Primary | ICD-10-CM

## 2019-07-08 DIAGNOSIS — K59.09 OTHER CONSTIPATION: ICD-10-CM

## 2019-07-08 DIAGNOSIS — M79.605 PAIN IN BOTH LOWER EXTREMITIES: ICD-10-CM

## 2019-07-08 DIAGNOSIS — E78.5 HYPERLIPIDEMIA, UNSPECIFIED HYPERLIPIDEMIA TYPE: ICD-10-CM

## 2019-07-08 DIAGNOSIS — I25.118 ATHEROSCLEROSIS OF NATIVE CORONARY ARTERY OF NATIVE HEART WITH OTHER FORM OF ANGINA PECTORIS (HCC): ICD-10-CM

## 2019-07-08 DIAGNOSIS — Z12.5 SCREENING PSA (PROSTATE SPECIFIC ANTIGEN): ICD-10-CM

## 2019-07-08 DIAGNOSIS — M79.604 PAIN IN BOTH LOWER EXTREMITIES: ICD-10-CM

## 2019-07-08 DIAGNOSIS — N40.1 BENIGN NON-NODULAR PROSTATIC HYPERPLASIA WITH LOWER URINARY TRACT SYMPTOMS: ICD-10-CM

## 2019-07-08 DIAGNOSIS — R73.9 HYPERGLYCEMIA: ICD-10-CM

## 2019-07-08 DIAGNOSIS — M1A.9XX0 CHRONIC GOUT WITHOUT TOPHUS, UNSPECIFIED CAUSE, UNSPECIFIED SITE: ICD-10-CM

## 2019-07-08 DIAGNOSIS — R35.1 NOCTURIA: ICD-10-CM

## 2019-07-08 PROCEDURE — 99214 OFFICE O/P EST MOD 30 MIN: CPT | Performed by: INTERNAL MEDICINE

## 2019-07-08 RX ORDER — LISINOPRIL 20 MG/1
TABLET ORAL
Qty: 90 TABLET | Refills: 1 | Status: SHIPPED | OUTPATIENT
Start: 2019-07-08 | End: 2020-01-17 | Stop reason: SDUPTHER

## 2019-07-08 RX ORDER — HYDROCHLOROTHIAZIDE 25 MG/1
TABLET ORAL
Qty: 90 TABLET | Refills: 1 | Status: SHIPPED | OUTPATIENT
Start: 2019-07-08 | End: 2020-01-17 | Stop reason: SDUPTHER

## 2019-07-08 RX ORDER — HYDROCODONE BITARTRATE AND ACETAMINOPHEN 5; 325 MG/1; MG/1
1 TABLET ORAL EVERY 6 HOURS PRN
Qty: 30 TABLET | Refills: 0 | Status: SHIPPED | OUTPATIENT
Start: 2019-07-08 | End: 2020-02-04 | Stop reason: SDUPTHER

## 2019-07-08 ASSESSMENT — ENCOUNTER SYMPTOMS
BACK PAIN: 1
ALLERGIC/IMMUNOLOGIC NEGATIVE: 1
VOMITING: 0
CONSTIPATION: 1
EYES NEGATIVE: 1
RESPIRATORY NEGATIVE: 1

## 2019-09-26 DIAGNOSIS — I25.118 ATHEROSCLEROSIS OF NATIVE CORONARY ARTERY OF NATIVE HEART WITH OTHER FORM OF ANGINA PECTORIS (HCC): ICD-10-CM

## 2019-09-26 DIAGNOSIS — R73.9 HYPERGLYCEMIA: ICD-10-CM

## 2019-09-26 DIAGNOSIS — M79.605 PAIN IN BOTH LOWER EXTREMITIES: ICD-10-CM

## 2019-09-26 DIAGNOSIS — N40.1 BENIGN NON-NODULAR PROSTATIC HYPERPLASIA WITH LOWER URINARY TRACT SYMPTOMS: ICD-10-CM

## 2019-09-26 DIAGNOSIS — M1A.9XX0 CHRONIC GOUT WITHOUT TOPHUS, UNSPECIFIED CAUSE, UNSPECIFIED SITE: ICD-10-CM

## 2019-09-26 DIAGNOSIS — R35.1 NOCTURIA: ICD-10-CM

## 2019-09-26 DIAGNOSIS — E78.5 HYPERLIPIDEMIA, UNSPECIFIED HYPERLIPIDEMIA TYPE: ICD-10-CM

## 2019-09-26 DIAGNOSIS — Z12.5 SCREENING PSA (PROSTATE SPECIFIC ANTIGEN): ICD-10-CM

## 2019-09-26 DIAGNOSIS — M79.604 PAIN IN BOTH LOWER EXTREMITIES: ICD-10-CM

## 2019-09-26 DIAGNOSIS — I10 ESSENTIAL HYPERTENSION: ICD-10-CM

## 2019-09-26 LAB
A/G RATIO: 2.1 (ref 1.1–2.2)
ALBUMIN SERPL-MCNC: 4.4 G/DL (ref 3.4–5)
ALP BLD-CCNC: 58 U/L (ref 40–129)
ALT SERPL-CCNC: 16 U/L (ref 10–40)
ANION GAP SERPL CALCULATED.3IONS-SCNC: 16 MMOL/L (ref 3–16)
AST SERPL-CCNC: 16 U/L (ref 15–37)
BASOPHILS ABSOLUTE: 0 K/UL (ref 0–0.2)
BASOPHILS RELATIVE PERCENT: 0.1 %
BILIRUB SERPL-MCNC: 0.6 MG/DL (ref 0–1)
BILIRUBIN URINE: NEGATIVE
BLOOD, URINE: NEGATIVE
BUN BLDV-MCNC: 18 MG/DL (ref 7–20)
CALCIUM SERPL-MCNC: 9.3 MG/DL (ref 8.3–10.6)
CHLORIDE BLD-SCNC: 106 MMOL/L (ref 99–110)
CHOLESTEROL, TOTAL: 147 MG/DL (ref 0–199)
CLARITY: CLEAR
CO2: 21 MMOL/L (ref 21–32)
COLOR: YELLOW
CREAT SERPL-MCNC: 1.3 MG/DL (ref 0.8–1.3)
EOSINOPHILS ABSOLUTE: 0.3 K/UL (ref 0–0.6)
EOSINOPHILS RELATIVE PERCENT: 3.8 %
GFR AFRICAN AMERICAN: >60
GFR NON-AFRICAN AMERICAN: 53
GLOBULIN: 2.1 G/DL
GLUCOSE BLD-MCNC: 96 MG/DL (ref 70–99)
GLUCOSE URINE: NEGATIVE MG/DL
HCT VFR BLD CALC: 42.7 % (ref 40.5–52.5)
HDLC SERPL-MCNC: 44 MG/DL (ref 40–60)
HEMOGLOBIN: 13.9 G/DL (ref 13.5–17.5)
KETONES, URINE: NEGATIVE MG/DL
LDL CHOLESTEROL CALCULATED: 85 MG/DL
LEUKOCYTE ESTERASE, URINE: NEGATIVE
LYMPHOCYTES ABSOLUTE: 1.5 K/UL (ref 1–5.1)
LYMPHOCYTES RELATIVE PERCENT: 20.3 %
MCH RBC QN AUTO: 28.6 PG (ref 26–34)
MCHC RBC AUTO-ENTMCNC: 32.6 G/DL (ref 31–36)
MCV RBC AUTO: 87.7 FL (ref 80–100)
MICROSCOPIC EXAMINATION: NORMAL
MONOCYTES ABSOLUTE: 0.7 K/UL (ref 0–1.3)
MONOCYTES RELATIVE PERCENT: 10.1 %
NEUTROPHILS ABSOLUTE: 4.9 K/UL (ref 1.7–7.7)
NEUTROPHILS RELATIVE PERCENT: 65.7 %
NITRITE, URINE: NEGATIVE
PDW BLD-RTO: 14.9 % (ref 12.4–15.4)
PH UA: 6 (ref 5–8)
PLATELET # BLD: 187 K/UL (ref 135–450)
PMV BLD AUTO: 9.2 FL (ref 5–10.5)
POTASSIUM SERPL-SCNC: 4.8 MMOL/L (ref 3.5–5.1)
PROSTATE SPECIFIC ANTIGEN: 1.9 NG/ML (ref 0–4)
PROTEIN UA: NEGATIVE MG/DL
RBC # BLD: 4.87 M/UL (ref 4.2–5.9)
SODIUM BLD-SCNC: 143 MMOL/L (ref 136–145)
SPECIFIC GRAVITY UA: 1.01 (ref 1–1.03)
TOTAL PROTEIN: 6.5 G/DL (ref 6.4–8.2)
TRIGL SERPL-MCNC: 90 MG/DL (ref 0–150)
TSH REFLEX: 2.5 UIU/ML (ref 0.27–4.2)
URINE REFLEX TO CULTURE: NORMAL
URINE TYPE: NORMAL
UROBILINOGEN, URINE: 0.2 E.U./DL
VLDLC SERPL CALC-MCNC: 18 MG/DL
WBC # BLD: 7.4 K/UL (ref 4–11)

## 2019-09-27 LAB
ESTIMATED AVERAGE GLUCOSE: 119.8 MG/DL
HBA1C MFR BLD: 5.8 %

## 2019-10-04 ENCOUNTER — TELEPHONE (OUTPATIENT)
Dept: INTERNAL MEDICINE CLINIC | Age: 79
End: 2019-10-04

## 2019-10-11 ENCOUNTER — OFFICE VISIT (OUTPATIENT)
Dept: INTERNAL MEDICINE CLINIC | Age: 79
End: 2019-10-11
Payer: COMMERCIAL

## 2019-10-11 VITALS
HEIGHT: 66 IN | HEART RATE: 68 BPM | SYSTOLIC BLOOD PRESSURE: 120 MMHG | WEIGHT: 204 LBS | RESPIRATION RATE: 12 BRPM | DIASTOLIC BLOOD PRESSURE: 70 MMHG | BODY MASS INDEX: 32.78 KG/M2

## 2019-10-11 DIAGNOSIS — Z01.818 PREOP EXAMINATION: ICD-10-CM

## 2019-10-11 DIAGNOSIS — K59.09 OTHER CONSTIPATION: ICD-10-CM

## 2019-10-11 DIAGNOSIS — M1A.9XX0 CHRONIC GOUT WITHOUT TOPHUS, UNSPECIFIED CAUSE, UNSPECIFIED SITE: ICD-10-CM

## 2019-10-11 DIAGNOSIS — H25.9 SENILE CATARACT OF RIGHT EYE, UNSPECIFIED AGE-RELATED CATARACT TYPE: ICD-10-CM

## 2019-10-11 DIAGNOSIS — I10 ESSENTIAL HYPERTENSION: ICD-10-CM

## 2019-10-11 DIAGNOSIS — I25.118 ATHEROSCLEROSIS OF NATIVE CORONARY ARTERY OF NATIVE HEART WITH OTHER FORM OF ANGINA PECTORIS (HCC): ICD-10-CM

## 2019-10-11 DIAGNOSIS — E78.5 HYPERLIPIDEMIA, UNSPECIFIED HYPERLIPIDEMIA TYPE: ICD-10-CM

## 2019-10-11 DIAGNOSIS — N40.1 BENIGN NON-NODULAR PROSTATIC HYPERPLASIA WITH LOWER URINARY TRACT SYMPTOMS: ICD-10-CM

## 2019-10-11 DIAGNOSIS — R73.9 HYPERGLYCEMIA: ICD-10-CM

## 2019-10-11 PROCEDURE — 99215 OFFICE O/P EST HI 40 MIN: CPT | Performed by: INTERNAL MEDICINE

## 2019-10-11 ASSESSMENT — ENCOUNTER SYMPTOMS
ALLERGIC/IMMUNOLOGIC NEGATIVE: 1
RESPIRATORY NEGATIVE: 1
CONSTIPATION: 1
VOMITING: 0
BACK PAIN: 1
EYES NEGATIVE: 1

## 2019-11-10 PROBLEM — Z01.818 PREOP EXAMINATION: Status: RESOLVED | Noted: 2018-11-12 | Resolved: 2019-11-10

## 2020-01-07 ENCOUNTER — OFFICE VISIT (OUTPATIENT)
Dept: CARDIOLOGY CLINIC | Age: 80
End: 2020-01-07
Payer: COMMERCIAL

## 2020-01-07 VITALS
WEIGHT: 216 LBS | DIASTOLIC BLOOD PRESSURE: 60 MMHG | BODY MASS INDEX: 34.86 KG/M2 | SYSTOLIC BLOOD PRESSURE: 110 MMHG | HEART RATE: 60 BPM

## 2020-01-07 PROCEDURE — G8427 DOCREV CUR MEDS BY ELIG CLIN: HCPCS | Performed by: INTERNAL MEDICINE

## 2020-01-07 PROCEDURE — 99214 OFFICE O/P EST MOD 30 MIN: CPT | Performed by: INTERNAL MEDICINE

## 2020-01-07 PROCEDURE — G8484 FLU IMMUNIZE NO ADMIN: HCPCS | Performed by: INTERNAL MEDICINE

## 2020-01-07 PROCEDURE — 1036F TOBACCO NON-USER: CPT | Performed by: INTERNAL MEDICINE

## 2020-01-07 PROCEDURE — 1123F ACP DISCUSS/DSCN MKR DOCD: CPT | Performed by: INTERNAL MEDICINE

## 2020-01-07 PROCEDURE — 4040F PNEUMOC VAC/ADMIN/RCVD: CPT | Performed by: INTERNAL MEDICINE

## 2020-01-07 PROCEDURE — G8417 CALC BMI ABV UP PARAM F/U: HCPCS | Performed by: INTERNAL MEDICINE

## 2020-01-07 ASSESSMENT — ENCOUNTER SYMPTOMS
COUGH: 0
CHOKING: 0
CHEST TIGHTNESS: 0
APNEA: 0
SHORTNESS OF BREATH: 0
ABDOMINAL DISTENTION: 0

## 2020-01-07 NOTE — PROGRESS NOTES
Subjective:      Patient ID: Germaine Picharod is a 67 y.o. male. Coronary Artery Disease   Pertinent negatives include no chest pain, chest tightness, dizziness, leg swelling, palpitations or shortness of breath. Former pt Dr Richmond Das. F/U CAD/CABG/HTN/Hyperlipidemia. Occ chest pain and sob. This occurs  when bends over. Not  with exertion. Catalina Maudlin No pnd. No edema. No palp/tachycardia. Wt stable. Some sob with bending over also.       Past Medical History:   Diagnosis Date    Contact dermatitis and other eczema, due to unspecified cause     Coronary atherosclerosis of unspecified type of vessel, native or graft     Cardio- Dr. Heber Rubio Gout, unspecified     Hypertension     Hypertrophy of prostate without urinary obstruction and other lower urinary tract symptoms (LUTS)     Other and unspecified hyperlipidemia     Other chest pain     Pain in limb     Foot Pain    Peripheral vascular complications     Screening PSA (prostate specific antigen) 2/19/2009    1.5    Stool blood 7/21/2008    Guiac Negative     Past Surgical History:   Procedure Laterality Date    CARDIAC SURGERY      4 vessel cabg Dr. Janel Carrera y    COLONOSCOPY  2/16/07    Dr. Abdulaziz Hernandez 2/2012    COLONOSCOPY  5.23.13    Susy Antoine MD polyp repeat 2018    COLONOSCOPY  06/18/2018    Dr. Zada Fleischer polyp removed no repeat necessary   [address]  [address]    Dr. Lisa Leahy Marital status:      Spouse name: Not on file    Number of children: Not on file    Years of education: Not on file    Highest education level: Not on file   Occupational History    Not on file   Social Needs    Financial resource strain: Not on file    Food insecurity:     Worry: Not on file     Inability: Not on file    Transportation needs:     Medical: Not on file     Non-medical: Not on file   Tobacco Use    Smoking status: Former Smoker     Packs/day: 1.50     Years: 50.00     Pack years:

## 2020-01-15 RX ORDER — ALLOPURINOL 300 MG/1
TABLET ORAL
Qty: 90 TABLET | Refills: 1 | Status: SHIPPED | OUTPATIENT
Start: 2020-01-15 | End: 2020-07-08 | Stop reason: SDUPTHER

## 2020-01-17 RX ORDER — LISINOPRIL 20 MG/1
TABLET ORAL
Qty: 90 TABLET | Refills: 1 | Status: SHIPPED | OUTPATIENT
Start: 2020-01-17 | End: 2020-07-18 | Stop reason: SDUPTHER

## 2020-01-17 RX ORDER — HYDROCHLOROTHIAZIDE 25 MG/1
TABLET ORAL
Qty: 90 TABLET | Refills: 1 | Status: SHIPPED | OUTPATIENT
Start: 2020-01-17 | End: 2021-07-14 | Stop reason: SDUPTHER

## 2020-01-17 RX ORDER — ATORVASTATIN CALCIUM 80 MG/1
80 TABLET, FILM COATED ORAL DAILY
Qty: 90 TABLET | Refills: 1 | Status: SHIPPED
Start: 2020-01-17 | End: 2020-08-24 | Stop reason: DRUGHIGH

## 2020-01-17 RX ORDER — ATORVASTATIN CALCIUM 40 MG/1
40 TABLET, FILM COATED ORAL DAILY
Qty: 90 TABLET | Refills: 2 | Status: CANCELLED | OUTPATIENT
Start: 2020-01-17

## 2020-02-04 ENCOUNTER — OFFICE VISIT (OUTPATIENT)
Dept: INTERNAL MEDICINE CLINIC | Age: 80
End: 2020-02-04
Payer: COMMERCIAL

## 2020-02-04 VITALS
HEIGHT: 66 IN | RESPIRATION RATE: 14 BRPM | SYSTOLIC BLOOD PRESSURE: 132 MMHG | DIASTOLIC BLOOD PRESSURE: 86 MMHG | BODY MASS INDEX: 33.75 KG/M2 | HEART RATE: 68 BPM | WEIGHT: 210 LBS

## 2020-02-04 PROCEDURE — 99214 OFFICE O/P EST MOD 30 MIN: CPT | Performed by: INTERNAL MEDICINE

## 2020-02-04 PROCEDURE — 1036F TOBACCO NON-USER: CPT | Performed by: INTERNAL MEDICINE

## 2020-02-04 PROCEDURE — G8417 CALC BMI ABV UP PARAM F/U: HCPCS | Performed by: INTERNAL MEDICINE

## 2020-02-04 PROCEDURE — G8427 DOCREV CUR MEDS BY ELIG CLIN: HCPCS | Performed by: INTERNAL MEDICINE

## 2020-02-04 PROCEDURE — G8484 FLU IMMUNIZE NO ADMIN: HCPCS | Performed by: INTERNAL MEDICINE

## 2020-02-04 PROCEDURE — 1123F ACP DISCUSS/DSCN MKR DOCD: CPT | Performed by: INTERNAL MEDICINE

## 2020-02-04 PROCEDURE — 4040F PNEUMOC VAC/ADMIN/RCVD: CPT | Performed by: INTERNAL MEDICINE

## 2020-02-04 RX ORDER — HYDROCODONE BITARTRATE AND ACETAMINOPHEN 5; 325 MG/1; MG/1
1 TABLET ORAL EVERY 6 HOURS PRN
Qty: 30 TABLET | Refills: 0 | Status: SHIPPED | OUTPATIENT
Start: 2020-02-04 | End: 2020-08-24 | Stop reason: SDUPTHER

## 2020-02-04 RX ORDER — ZOLPIDEM TARTRATE 10 MG/1
10 TABLET ORAL NIGHTLY PRN
Qty: 90 TABLET | Refills: 0 | Status: SHIPPED | OUTPATIENT
Start: 2020-02-04 | End: 2021-03-23 | Stop reason: SDUPTHER

## 2020-02-04 RX ORDER — ATORVASTATIN CALCIUM 40 MG/1
40 TABLET, FILM COATED ORAL DAILY
Qty: 90 TABLET | Refills: 1 | Status: SHIPPED | OUTPATIENT
Start: 2020-02-04 | End: 2021-03-23 | Stop reason: SDUPTHER

## 2020-02-04 RX ORDER — MECLIZINE HCL 12.5 MG/1
12.5 TABLET ORAL 3 TIMES DAILY PRN
Qty: 30 TABLET | Refills: 0 | Status: SHIPPED | OUTPATIENT
Start: 2020-02-04 | End: 2020-02-14

## 2020-02-04 ASSESSMENT — ENCOUNTER SYMPTOMS
BACK PAIN: 1
ALLERGIC/IMMUNOLOGIC NEGATIVE: 1
EYES NEGATIVE: 1
CONSTIPATION: 1
VOMITING: 0
RESPIRATORY NEGATIVE: 1

## 2020-02-04 ASSESSMENT — PATIENT HEALTH QUESTIONNAIRE - PHQ9
2. FEELING DOWN, DEPRESSED OR HOPELESS: 0
1. LITTLE INTEREST OR PLEASURE IN DOING THINGS: 0
SUM OF ALL RESPONSES TO PHQ QUESTIONS 1-9: 0
SUM OF ALL RESPONSES TO PHQ9 QUESTIONS 1 & 2: 0
SUM OF ALL RESPONSES TO PHQ QUESTIONS 1-9: 0

## 2020-02-04 NOTE — PROGRESS NOTES
Subjective:      Patient ID: Danny Summers is a 67 y.o. male. HPI  Here today for follow up of chronic problems as per HPI and as problems listed under assessment and plan,no new c/o feels good     Hypertension   This is a chronic problem. The current episode started more than 1 year ago. The problem is unchanged. The problem is controlled. Associated symptoms include anxiety, chest pain (occ .sx ) and palpitations. Risk factors for coronary artery disease include dyslipidemia, obesity and male gender. Past treatments include beta blockers, direct vasodilators, lifestyle changes and diuretics. The current treatment provides significant improvement. There are no compliance problems. Hypertensive end-organ damage includes CAD/MI. Hyperlipidemia   This is a chronic problem. The current episode started more than 1 year ago. The problem is uncontrolled (? past compliance with diet and meds ). Associated symptoms include chest pain (occ .sx ). Current antihyperlipidemic treatment includes diet change, exercise and statins. The current treatment provides moderate improvement of lipids. Compliance problems include adherence to exercise, psychosocial issues and adherence to diet. Risk factors for coronary artery disease include dyslipidemia, hypertension, male sex and obesity (known CAD ).        Allergies   Allergen Reactions    Sulfa Antibiotics        Current Outpatient Medications   Medication Sig Dispense Refill    hydrochlorothiazide (HYDRODIURIL) 25 MG tablet TAKE ONE TABLET BY MOUTH DAILY 90 tablet 1    lisinopril (PRINIVIL;ZESTRIL) 20 MG tablet TAKE ONE TABLET BY MOUTH DAILY 90 tablet 1    atorvastatin (LIPITOR) 80 MG tablet Take 1 tablet by mouth daily 90 tablet 1    allopurinol (ZYLOPRIM) 300 MG tablet TAKE ONE TABLET BY MOUTH DAILY 90 tablet 1    isosorbide mononitrate (IMDUR) 60 MG extended release tablet Take 1 tablet by mouth daily 180 tablet 3    nitroGLYCERIN (NITROSTAT) 0.4 MG SL tablet Place 1 tablet under the tongue every 5 minutes as needed for Chest pain 30 tablet 2    Polyethylene Glycol 3350 (MIRALAX PO) Take by mouth      amLODIPine (NORVASC) 5 MG tablet TAKE THREE TABLETS BY MOUTH DAILY 90 tablet 1    COLCRYS 0.6 MG tablet TAKE ONE TABLET BY MOUTH DAILY 15 tablet 2    vitamin D (CHOLECALCIFEROL) 1000 UNIT TABS tablet Take 2,000 Int'l Units by mouth daily.  aspirin 325 MG tablet Take 325 mg by mouth daily.  finasteride (PROSCAR) 5 MG tablet Take 5 mg by mouth daily.  clotrimazole-betamethasone (LOTRISONE) cream Apply  topically 2 times daily.  fish oil-omega-3 fatty acids 1000 MG capsule Take 2 g by mouth daily.  naftifine (NAFTIN) 1 % cream Apply  topically daily. No current facility-administered medications for this visit.         Past Medical History:   Diagnosis Date    Contact dermatitis and other eczema, due to unspecified cause     Coronary atherosclerosis of unspecified type of vessel, native or graft     Cardio- Dr. Jacob Garcia Gout, unspecified     Hypertension     Hypertrophy of prostate without urinary obstruction and other lower urinary tract symptoms (LUTS)     Other and unspecified hyperlipidemia     Other chest pain     Pain in limb     Foot Pain    Peripheral vascular complications     Screening PSA (prostate specific antigen) 2/19/2009    1.5    Stool blood 7/21/2008    Guiac Negative       Family History   Problem Relation Age of Onset    Coronary Art Dis Father        Past Surgical History:   Procedure Laterality Date    CARDIAC SURGERY      4 vessel cabg Dr. Iggy Bear y    COLONOSCOPY  2/16/07    Dr. Kiera Hollins 2/2012    COLONOSCOPY  5.23.13    Tom Keene MD polyp repeat 2018    COLONOSCOPY  06/18/2018    Dr. Rikki Boyd polyp removed no repeat necessary   [address]  [address]    Dr. Claudean Rome History     Socioeconomic History    Marital status:      Spouse name: Not on file    Number of children: Not on file    Years of education: Not on file    Highest education level: Not on file   Occupational History    Not on file   Social Needs    Financial resource strain: Not on file    Food insecurity:     Worry: Not on file     Inability: Not on file    Transportation needs:     Medical: Not on file     Non-medical: Not on file   Tobacco Use    Smoking status: Former Smoker     Packs/day: 1.50     Years: 50.00     Pack years: 75.00     Types: Cigarettes     Last attempt to quit: 2003     Years since quittin.1    Smokeless tobacco: Former User   Substance and Sexual Activity    Alcohol use: Yes     Comment: Seldom    Drug use: No    Sexual activity: Yes     Partners: Female   Lifestyle    Physical activity:     Days per week: Not on file     Minutes per session: Not on file    Stress: Not on file   Relationships    Social connections:     Talks on phone: Not on file     Gets together: Not on file     Attends Confucianism service: Not on file     Active member of club or organization: Not on file     Attends meetings of clubs or organizations: Not on file     Relationship status: Not on file    Intimate partner violence:     Fear of current or ex partner: Not on file     Emotionally abused: Not on file     Physically abused: Not on file     Forced sexual activity: Not on file   Other Topics Concern    Not on file   Social History Narrative    Not on file       Review of Systems  Review of Systems   Constitutional: Positive for unexpected weight change (down a few # ). HENT: Negative. Eyes: Negative. Respiratory: Negative. Cardiovascular: Positive for chest pain (occ .sx ) and palpitations. Gastrointestinal: Positive for constipation (improved ). Negative for vomiting. Constipation improved    Endocrine: Negative. Genitourinary: Positive for frequency. BPH as per Dr Natasha Higgins    Musculoskeletal: Positive for arthralgias and back pain. Skin: Negative.          Mult

## 2020-07-08 ENCOUNTER — OFFICE VISIT (OUTPATIENT)
Dept: CARDIOLOGY CLINIC | Age: 80
End: 2020-07-08
Payer: COMMERCIAL

## 2020-07-08 VITALS
BODY MASS INDEX: 34.12 KG/M2 | TEMPERATURE: 97.8 F | WEIGHT: 217.4 LBS | HEIGHT: 67 IN | HEART RATE: 77 BPM | OXYGEN SATURATION: 96 % | SYSTOLIC BLOOD PRESSURE: 130 MMHG | DIASTOLIC BLOOD PRESSURE: 80 MMHG

## 2020-07-08 PROCEDURE — 1036F TOBACCO NON-USER: CPT | Performed by: INTERNAL MEDICINE

## 2020-07-08 PROCEDURE — 1123F ACP DISCUSS/DSCN MKR DOCD: CPT | Performed by: INTERNAL MEDICINE

## 2020-07-08 PROCEDURE — G8417 CALC BMI ABV UP PARAM F/U: HCPCS | Performed by: INTERNAL MEDICINE

## 2020-07-08 PROCEDURE — G8427 DOCREV CUR MEDS BY ELIG CLIN: HCPCS | Performed by: INTERNAL MEDICINE

## 2020-07-08 PROCEDURE — 99214 OFFICE O/P EST MOD 30 MIN: CPT | Performed by: INTERNAL MEDICINE

## 2020-07-08 PROCEDURE — 93000 ELECTROCARDIOGRAM COMPLETE: CPT | Performed by: INTERNAL MEDICINE

## 2020-07-08 PROCEDURE — 4040F PNEUMOC VAC/ADMIN/RCVD: CPT | Performed by: INTERNAL MEDICINE

## 2020-07-08 RX ORDER — ISOSORBIDE MONONITRATE 60 MG/1
120 TABLET, EXTENDED RELEASE ORAL DAILY
Qty: 180 TABLET | Refills: 3 | Status: SHIPPED | OUTPATIENT
Start: 2020-07-08 | End: 2022-02-11 | Stop reason: SDUPTHER

## 2020-07-08 RX ORDER — NITROGLYCERIN 0.4 MG/1
0.4 TABLET SUBLINGUAL EVERY 5 MIN PRN
Qty: 25 TABLET | Refills: 3 | Status: SHIPPED | OUTPATIENT
Start: 2020-07-08 | End: 2021-12-07

## 2020-07-08 RX ORDER — ALLOPURINOL 300 MG/1
TABLET ORAL
Qty: 90 TABLET | Refills: 3 | Status: SHIPPED | OUTPATIENT
Start: 2020-07-08 | End: 2022-02-23 | Stop reason: SDUPTHER

## 2020-07-08 ASSESSMENT — ENCOUNTER SYMPTOMS
APNEA: 0
ABDOMINAL DISTENTION: 0
CHOKING: 0
COUGH: 0
SHORTNESS OF BREATH: 0
CHEST TIGHTNESS: 0

## 2020-07-08 NOTE — PROGRESS NOTES
Subjective:      Patient ID: Adilson Miranda is a 67 y.o. male. Coronary Artery Disease   Pertinent negatives include no chest pain, chest tightness, dizziness, leg swelling, palpitations or shortness of breath. Former pt Dr Michele Rose. F/U CAD/CABG/HTN/Hyperlipidemia. Continues with some SOB. Walks dog for 2-3 hrs per day. No pnd. No edema. No orthopnea. No palp/tachycardia. Wt stable.         Past Medical History:   Diagnosis Date    Contact dermatitis and other eczema, due to unspecified cause     Coronary atherosclerosis of unspecified type of vessel, native or graft     Cardio- Dr. Larry Been Gout, unspecified     Hypertension     Hypertrophy of prostate without urinary obstruction and other lower urinary tract symptoms (LUTS)     Other and unspecified hyperlipidemia     Other chest pain     Pain in limb     Foot Pain    Peripheral vascular complications     Screening PSA (prostate specific antigen) 2/19/2009    1.5    Stool blood 7/21/2008    Guiac Negative     Past Surgical History:   Procedure Laterality Date    CARDIAC SURGERY      4 vessel cabg Dr. Violette Apodaca y    COLONOSCOPY  2/16/07    Dr. Sharda Melgoza 2/2012    COLONOSCOPY  5.23.13    Carlitos Acuña MD polyp repeat 2018    COLONOSCOPY  06/18/2018    Dr. Tika Sampson polyp removed no repeat necessary   [address]  [address]    Dr. Adriano Benítez Marital status:      Spouse name: Not on file    Number of children: Not on file    Years of education: Not on file    Highest education level: Not on file   Occupational History    Not on file   Social Needs    Financial resource strain: Not on file    Food insecurity     Worry: Not on file     Inability: Not on file   [employer] needs     Medical: Not on file     Non-medical: Not on file   Tobacco Use    Smoking status: Former Smoker     Packs/day: 1.50     Years: 50.00     Pack years: 75.00     Types: Cigarettes     Last exhibits no discharge. Neck: Normal range of motion. Neck supple. No JVD present. Cardiovascular: Normal rate, regular rhythm, S2 normal and normal heart sounds. Exam reveals no gallop. No murmur heard. Pulmonary/Chest: Effort normal and breath sounds normal. No respiratory distress. He has no wheezes. He has no rales. Abdominal: Soft. Bowel sounds are normal. There is no abdominal tenderness. Musculoskeletal: Normal range of motion. General: No edema. Neurological: He is alert and oriented to person, place, and time. Skin: Skin is warm and dry. Psychiatric: He has a normal mood and affect. His behavior is normal. Thought content normal.       Assessment:       Diagnosis Orders   1. CAD in native artery     2. Hx of CABG     3. Essential hypertension     4. Hyperlipidemia, unspecified hyperlipidemia type             Plan:      CV stable. Some ESCOBAR but unchanged. Compensated. Wt stable. BP good. EKG NSR, PVC, NSSTTW changes. Will empirically increase imdur to 120 mg daily for ESCOBAR. Reviewed previous records and testing including myoview 6/19. No changes. Continue to monitor. Follow up 6 months.

## 2020-07-20 RX ORDER — LISINOPRIL 20 MG/1
TABLET ORAL
Qty: 30 TABLET | Refills: 0 | Status: SHIPPED | OUTPATIENT
Start: 2020-07-20 | End: 2020-08-24 | Stop reason: SDUPTHER

## 2020-08-24 ENCOUNTER — OFFICE VISIT (OUTPATIENT)
Dept: INTERNAL MEDICINE CLINIC | Age: 80
End: 2020-08-24
Payer: COMMERCIAL

## 2020-08-24 VITALS
TEMPERATURE: 98.6 F | HEIGHT: 67 IN | SYSTOLIC BLOOD PRESSURE: 124 MMHG | DIASTOLIC BLOOD PRESSURE: 78 MMHG | RESPIRATION RATE: 14 BRPM | WEIGHT: 218.8 LBS | HEART RATE: 78 BPM | BODY MASS INDEX: 34.34 KG/M2

## 2020-08-24 PROCEDURE — 1123F ACP DISCUSS/DSCN MKR DOCD: CPT | Performed by: INTERNAL MEDICINE

## 2020-08-24 PROCEDURE — G8417 CALC BMI ABV UP PARAM F/U: HCPCS | Performed by: INTERNAL MEDICINE

## 2020-08-24 PROCEDURE — 1036F TOBACCO NON-USER: CPT | Performed by: INTERNAL MEDICINE

## 2020-08-24 PROCEDURE — 99214 OFFICE O/P EST MOD 30 MIN: CPT | Performed by: INTERNAL MEDICINE

## 2020-08-24 PROCEDURE — 4040F PNEUMOC VAC/ADMIN/RCVD: CPT | Performed by: INTERNAL MEDICINE

## 2020-08-24 PROCEDURE — G8427 DOCREV CUR MEDS BY ELIG CLIN: HCPCS | Performed by: INTERNAL MEDICINE

## 2020-08-24 RX ORDER — HYDROCODONE BITARTRATE AND ACETAMINOPHEN 5; 325 MG/1; MG/1
1 TABLET ORAL EVERY 6 HOURS PRN
Qty: 30 TABLET | Refills: 0 | Status: SHIPPED | OUTPATIENT
Start: 2020-08-24 | End: 2021-03-23 | Stop reason: SDUPTHER

## 2020-08-24 RX ORDER — LISINOPRIL 20 MG/1
TABLET ORAL
Qty: 90 TABLET | Refills: 1 | Status: SHIPPED | OUTPATIENT
Start: 2020-08-24 | End: 2021-03-23 | Stop reason: SDUPTHER

## 2020-08-24 RX ORDER — COLCHICINE 0.6 MG/1
TABLET ORAL
Qty: 90 TABLET | Refills: 1 | Status: SHIPPED | OUTPATIENT
Start: 2020-08-24

## 2020-08-24 ASSESSMENT — ENCOUNTER SYMPTOMS
ALLERGIC/IMMUNOLOGIC NEGATIVE: 1
BACK PAIN: 1
CONSTIPATION: 1
VOMITING: 0
RESPIRATORY NEGATIVE: 1
EYES NEGATIVE: 1

## 2020-08-24 NOTE — PROGRESS NOTES
Subjective:      Patient ID: Grecia Liao is a 67 y.o. male. HPI  Here today for follow up of chronic problems as per HPI and as problems listed under assessment and plan,no new c/o feels good     Hypertension   This is a chronic problem. The current episode started more than 1 year ago. The problem is unchanged. The problem is controlled. Associated symptoms include anxiety, chest pain (occ .sx ) and palpitations. Risk factors for coronary artery disease include dyslipidemia, obesity and male gender. Past treatments include beta blockers, direct vasodilators, lifestyle changes and diuretics. The current treatment provides significant improvement. There are no compliance problems. Hypertensive end-organ damage includes CAD/MI. Hyperlipidemia   This is a chronic problem. The current episode started more than 1 year ago. The problem is uncontrolled (? past compliance with diet and meds ). Associated symptoms include chest pain (occ .sx ). Current antihyperlipidemic treatment includes diet change, exercise and statins. The current treatment provides moderate improvement of lipids. Compliance problems include adherence to exercise, psychosocial issues and adherence to diet. Risk factors for coronary artery disease include dyslipidemia, hypertension, male sex and obesity (known CAD ).        Allergies   Allergen Reactions    Sulfa Antibiotics        Current Outpatient Medications   Medication Sig Dispense Refill    lisinopril (PRINIVIL;ZESTRIL) 20 MG tablet TAKE ONE TABLET BY MOUTH DAILY 30 tablet 0    allopurinol (ZYLOPRIM) 300 MG tablet TAKE ONE TABLET BY MOUTH DAILY 90 tablet 3    isosorbide mononitrate (IMDUR) 60 MG extended release tablet Take 2 tablets by mouth daily 180 tablet 3    nitroGLYCERIN (NITROSTAT) 0.4 MG SL tablet Place 1 tablet under the tongue every 5 minutes as needed for Chest pain 25 tablet 3    atorvastatin (LIPITOR) 40 MG tablet Take 1 tablet by mouth daily 90 tablet 1    hydrochlorothiazide (HYDRODIURIL) 25 MG tablet TAKE ONE TABLET BY MOUTH DAILY 90 tablet 1    Polyethylene Glycol 3350 (MIRALAX PO) Take by mouth      amLODIPine (NORVASC) 5 MG tablet TAKE THREE TABLETS BY MOUTH DAILY 90 tablet 1    COLCRYS 0.6 MG tablet TAKE ONE TABLET BY MOUTH DAILY 15 tablet 2    vitamin D (CHOLECALCIFEROL) 1000 UNIT TABS tablet Take 2,000 Int'l Units by mouth daily.  aspirin 325 MG tablet Take 325 mg by mouth daily.  finasteride (PROSCAR) 5 MG tablet Take 5 mg by mouth daily.  clotrimazole-betamethasone (LOTRISONE) cream Apply  topically 2 times daily.  fish oil-omega-3 fatty acids 1000 MG capsule Take 2 g by mouth daily.  naftifine (NAFTIN) 1 % cream Apply  topically daily. No current facility-administered medications for this visit.         Past Medical History:   Diagnosis Date    Contact dermatitis and other eczema, due to unspecified cause     Coronary atherosclerosis of unspecified type of vessel, native or graft     Cardio- Dr. Savanah Merrill Gout, unspecified     Hypertension     Hypertrophy of prostate without urinary obstruction and other lower urinary tract symptoms (LUTS)     Other and unspecified hyperlipidemia     Other chest pain     Pain in limb     Foot Pain    Peripheral vascular complications     Screening PSA (prostate specific antigen) 2/19/2009    1.5    Stool blood 7/21/2008    Guiac Negative       Family History   Problem Relation Age of Onset    Coronary Art Dis Father        Past Surgical History:   Procedure Laterality Date    CARDIAC SURGERY      4 vessel cabg Dr. Sangeetha Marrufo y    COLONOSCOPY  2/16/07    Dr. Frank Rodriguez 2/2012    COLONOSCOPY  5.23.13    Dmitri Fischer MD polyp repeat 2018    COLONOSCOPY  06/18/2018    Dr. Florian Vargas polyp removed no repeat necessary   [facility] [facility]  [address]    Dr. Jennifer Ruiz History     Socioeconomic History    Marital status:      Spouse name: Not on file    Number of children: Not on file    Years of education: Not on file    Highest education level: Not on file   Occupational History    Not on file   Social Needs    Financial resource strain: Not on file    Food insecurity     Worry: Not on file     Inability: Not on file    Transportation needs     Medical: Not on file     Non-medical: Not on file   Tobacco Use    Smoking status: Former Smoker     Packs/day: 1.50     Years: 50.00     Pack years: 75.00     Types: Cigarettes     Last attempt to quit: 2003     Years since quittin.6    Smokeless tobacco: Former User   Substance and Sexual Activity    Alcohol use: Yes     Comment: Seldom    Drug use: No    Sexual activity: Yes     Partners: Female   Lifestyle    Physical activity     Days per week: Not on file     Minutes per session: Not on file    Stress: Not on file   Relationships    Social connections     Talks on phone: Not on file     Gets together: Not on file     Attends Mandaeism service: Not on file     Active member of club or organization: Not on file     Attends meetings of clubs or organizations: Not on file     Relationship status: Not on file    Intimate partner violence     Fear of current or ex partner: Not on file     Emotionally abused: Not on file     Physically abused: Not on file     Forced sexual activity: Not on file   Other Topics Concern    Not on file   Social History Narrative    Not on file       Review of Systems  Review of Systems   Constitutional: Positive for unexpected weight change (down a few # ). HENT: Negative. Eyes: Negative. Respiratory: Negative. Cardiovascular: Positive for chest pain (occ .sx ) and palpitations. Gastrointestinal: Positive for constipation (improved ). Negative for vomiting. Constipation improved    Endocrine: Negative. Genitourinary: Positive for frequency. BPH as per Dr Jemima Gonzalez    Musculoskeletal: Positive for arthralgias and back pain. Skin: Negative. Mult moles  ? ? Changes will refer to derm    Allergic/Immunologic: Negative. Neurological: Positive for dizziness and light-headedness. Negative for tremors, weakness and numbness. Hematological: Negative. Psychiatric/Behavioral: Positive for sleep disturbance. Objective:   Physical Exam:  Physical Exam  Constitutional:       Appearance: He is well-developed. HENT:      Head: Normocephalic and atraumatic. Right Ear: External ear normal.      Left Ear: External ear normal.   Eyes:      Conjunctiva/sclera: Conjunctivae normal.      Pupils: Pupils are equal, round, and reactive to light. Comments: L Cataract as per Dr Molina Collar    Neck:      Musculoskeletal: Normal range of motion and neck supple. Thyroid: No thyromegaly. Trachea: No tracheal deviation. Cardiovascular:      Rate and Rhythm: Normal rate and regular rhythm. Pulses: Normal pulses. Heart sounds: Normal heart sounds. No murmur. Pulmonary:      Effort: Pulmonary effort is normal.      Breath sounds: Normal breath sounds. Abdominal:      General: Abdomen is flat. Bowel sounds are normal.      Palpations: Abdomen is soft. Genitourinary:     Comments:    Musculoskeletal: Normal range of motion. Skin:     General: Skin is warm and dry. Comments: Mult moles over body ? Changes in shape and color    Neurological:      General: No focal deficit present. Mental Status: He is alert. Mental status is at baseline. He is disoriented. Cranial Nerves: No cranial nerve deficit. Deep Tendon Reflexes: Reflexes are normal and symmetric. Reflexes normal.   Psychiatric:         Mood and Affect: Mood normal.         Behavior: Behavior normal.         Thought Content:  Thought content normal.         /78 (Site: Right Upper Arm, Position: Sitting, Cuff Size: Medium Adult)   Pulse 78   Temp 98.6 °F (37 °C) (Oral)   Resp 14   Ht 5' 7\" (1.702 m)   Wt 218 lb 12.8 oz (99.2 kg)   BMI 34.27 kg/m²

## 2020-08-26 ENCOUNTER — TELEPHONE (OUTPATIENT)
Dept: INTERNAL MEDICINE CLINIC | Age: 80
End: 2020-08-26

## 2020-10-07 DIAGNOSIS — I10 ESSENTIAL HYPERTENSION: ICD-10-CM

## 2020-10-07 DIAGNOSIS — K59.09 OTHER CONSTIPATION: ICD-10-CM

## 2020-10-07 DIAGNOSIS — E78.5 HYPERLIPIDEMIA, UNSPECIFIED HYPERLIPIDEMIA TYPE: ICD-10-CM

## 2020-10-07 DIAGNOSIS — N40.1 BENIGN NON-NODULAR PROSTATIC HYPERPLASIA WITH LOWER URINARY TRACT SYMPTOMS: ICD-10-CM

## 2020-10-07 DIAGNOSIS — F51.01 PRIMARY INSOMNIA: ICD-10-CM

## 2020-10-07 DIAGNOSIS — M79.604 PAIN IN BOTH LOWER EXTREMITIES: ICD-10-CM

## 2020-10-07 DIAGNOSIS — M1A.9XX0 CHRONIC GOUT WITHOUT TOPHUS, UNSPECIFIED CAUSE, UNSPECIFIED SITE: ICD-10-CM

## 2020-10-07 DIAGNOSIS — R73.9 HYPERGLYCEMIA: ICD-10-CM

## 2020-10-07 DIAGNOSIS — I25.118 ATHEROSCLEROSIS OF NATIVE CORONARY ARTERY OF NATIVE HEART WITH OTHER FORM OF ANGINA PECTORIS (HCC): ICD-10-CM

## 2020-10-07 DIAGNOSIS — M79.605 PAIN IN BOTH LOWER EXTREMITIES: ICD-10-CM

## 2020-10-07 LAB
A/G RATIO: 2.1 (ref 1.1–2.2)
ALBUMIN SERPL-MCNC: 4.5 G/DL (ref 3.4–5)
ALP BLD-CCNC: 47 U/L (ref 40–129)
ALT SERPL-CCNC: 13 U/L (ref 10–40)
ANION GAP SERPL CALCULATED.3IONS-SCNC: 11 MMOL/L (ref 3–16)
AST SERPL-CCNC: 16 U/L (ref 15–37)
BASOPHILS ABSOLUTE: 0 K/UL (ref 0–0.2)
BASOPHILS RELATIVE PERCENT: 0.3 %
BILIRUB SERPL-MCNC: 0.7 MG/DL (ref 0–1)
BUN BLDV-MCNC: 24 MG/DL (ref 7–20)
CALCIUM SERPL-MCNC: 10.1 MG/DL (ref 8.3–10.6)
CHLORIDE BLD-SCNC: 104 MMOL/L (ref 99–110)
CHOLESTEROL, TOTAL: 218 MG/DL (ref 0–199)
CO2: 25 MMOL/L (ref 21–32)
CREAT SERPL-MCNC: 1.4 MG/DL (ref 0.8–1.3)
EOSINOPHILS ABSOLUTE: 0.3 K/UL (ref 0–0.6)
EOSINOPHILS RELATIVE PERCENT: 3.9 %
ESTIMATED AVERAGE GLUCOSE: 125.5 MG/DL
GFR AFRICAN AMERICAN: 59
GFR NON-AFRICAN AMERICAN: 49
GLOBULIN: 2.1 G/DL
GLUCOSE BLD-MCNC: 95 MG/DL (ref 70–99)
HBA1C MFR BLD: 6 %
HCT VFR BLD CALC: 44.7 % (ref 40.5–52.5)
HDLC SERPL-MCNC: 38 MG/DL (ref 40–60)
HEMOGLOBIN: 14.4 G/DL (ref 13.5–17.5)
LDL CHOLESTEROL CALCULATED: 143 MG/DL
LYMPHOCYTES ABSOLUTE: 1.5 K/UL (ref 1–5.1)
LYMPHOCYTES RELATIVE PERCENT: 22.4 %
MCH RBC QN AUTO: 28.5 PG (ref 26–34)
MCHC RBC AUTO-ENTMCNC: 32.2 G/DL (ref 31–36)
MCV RBC AUTO: 88.5 FL (ref 80–100)
MONOCYTES ABSOLUTE: 0.8 K/UL (ref 0–1.3)
MONOCYTES RELATIVE PERCENT: 11.2 %
NEUTROPHILS ABSOLUTE: 4.3 K/UL (ref 1.7–7.7)
NEUTROPHILS RELATIVE PERCENT: 62.2 %
PDW BLD-RTO: 14.9 % (ref 12.4–15.4)
PLATELET # BLD: 209 K/UL (ref 135–450)
PMV BLD AUTO: 9 FL (ref 5–10.5)
POTASSIUM SERPL-SCNC: 5.7 MMOL/L (ref 3.5–5.1)
PROSTATE SPECIFIC ANTIGEN: 2.63 NG/ML (ref 0–4)
RBC # BLD: 5.05 M/UL (ref 4.2–5.9)
SODIUM BLD-SCNC: 140 MMOL/L (ref 136–145)
TOTAL PROTEIN: 6.6 G/DL (ref 6.4–8.2)
TRIGL SERPL-MCNC: 183 MG/DL (ref 0–150)
TSH REFLEX: 2.51 UIU/ML (ref 0.27–4.2)
URIC ACID, SERUM: 8.4 MG/DL (ref 3.5–7.2)
VLDLC SERPL CALC-MCNC: 37 MG/DL
WBC # BLD: 6.8 K/UL (ref 4–11)

## 2021-03-23 ENCOUNTER — OFFICE VISIT (OUTPATIENT)
Dept: INTERNAL MEDICINE CLINIC | Age: 81
End: 2021-03-23
Payer: COMMERCIAL

## 2021-03-23 VITALS
HEART RATE: 66 BPM | WEIGHT: 219.6 LBS | HEIGHT: 67 IN | BODY MASS INDEX: 34.47 KG/M2 | DIASTOLIC BLOOD PRESSURE: 60 MMHG | SYSTOLIC BLOOD PRESSURE: 110 MMHG | RESPIRATION RATE: 16 BRPM | TEMPERATURE: 97.5 F

## 2021-03-23 DIAGNOSIS — E78.2 MIXED HYPERLIPIDEMIA: ICD-10-CM

## 2021-03-23 DIAGNOSIS — R73.9 HYPERGLYCEMIA: ICD-10-CM

## 2021-03-23 DIAGNOSIS — M79.605 PAIN IN BOTH LOWER EXTREMITIES: ICD-10-CM

## 2021-03-23 DIAGNOSIS — M1A.9XX0 CHRONIC GOUT WITHOUT TOPHUS, UNSPECIFIED CAUSE, UNSPECIFIED SITE: ICD-10-CM

## 2021-03-23 DIAGNOSIS — K59.09 OTHER CONSTIPATION: ICD-10-CM

## 2021-03-23 DIAGNOSIS — F51.01 PRIMARY INSOMNIA: ICD-10-CM

## 2021-03-23 DIAGNOSIS — I25.118 ATHEROSCLEROSIS OF NATIVE CORONARY ARTERY OF NATIVE HEART WITH OTHER FORM OF ANGINA PECTORIS (HCC): ICD-10-CM

## 2021-03-23 DIAGNOSIS — M79.604 PAIN IN BOTH LOWER EXTREMITIES: ICD-10-CM

## 2021-03-23 DIAGNOSIS — R35.1 NOCTURIA: ICD-10-CM

## 2021-03-23 DIAGNOSIS — I10 ESSENTIAL HYPERTENSION: Primary | ICD-10-CM

## 2021-03-23 DIAGNOSIS — N40.1 BENIGN NON-NODULAR PROSTATIC HYPERPLASIA WITH LOWER URINARY TRACT SYMPTOMS: ICD-10-CM

## 2021-03-23 DIAGNOSIS — R42 VERTIGO: ICD-10-CM

## 2021-03-23 PROCEDURE — 99214 OFFICE O/P EST MOD 30 MIN: CPT | Performed by: INTERNAL MEDICINE

## 2021-03-23 PROCEDURE — G8427 DOCREV CUR MEDS BY ELIG CLIN: HCPCS | Performed by: INTERNAL MEDICINE

## 2021-03-23 PROCEDURE — 1123F ACP DISCUSS/DSCN MKR DOCD: CPT | Performed by: INTERNAL MEDICINE

## 2021-03-23 PROCEDURE — G8484 FLU IMMUNIZE NO ADMIN: HCPCS | Performed by: INTERNAL MEDICINE

## 2021-03-23 PROCEDURE — 1036F TOBACCO NON-USER: CPT | Performed by: INTERNAL MEDICINE

## 2021-03-23 PROCEDURE — 4040F PNEUMOC VAC/ADMIN/RCVD: CPT | Performed by: INTERNAL MEDICINE

## 2021-03-23 PROCEDURE — G8417 CALC BMI ABV UP PARAM F/U: HCPCS | Performed by: INTERNAL MEDICINE

## 2021-03-23 RX ORDER — LISINOPRIL 20 MG/1
TABLET ORAL
Qty: 90 TABLET | Refills: 1 | Status: SHIPPED | OUTPATIENT
Start: 2021-03-23 | End: 2021-11-02 | Stop reason: ALTCHOICE

## 2021-03-23 RX ORDER — ZOLPIDEM TARTRATE 10 MG/1
10 TABLET ORAL NIGHTLY PRN
Qty: 90 TABLET | Refills: 0 | Status: SHIPPED | OUTPATIENT
Start: 2021-03-23 | End: 2022-02-23 | Stop reason: SDUPTHER

## 2021-03-23 RX ORDER — HYDROCODONE BITARTRATE AND ACETAMINOPHEN 5; 325 MG/1; MG/1
1 TABLET ORAL EVERY 6 HOURS PRN
Qty: 30 TABLET | Refills: 0 | Status: SHIPPED | OUTPATIENT
Start: 2021-03-23 | End: 2021-11-02 | Stop reason: SDUPTHER

## 2021-03-23 RX ORDER — ATORVASTATIN CALCIUM 40 MG/1
40 TABLET, FILM COATED ORAL DAILY
Qty: 90 TABLET | Refills: 1 | Status: SHIPPED | OUTPATIENT
Start: 2021-03-23 | End: 2022-02-23 | Stop reason: SDUPTHER

## 2021-03-23 ASSESSMENT — ENCOUNTER SYMPTOMS
EYES NEGATIVE: 1
RESPIRATORY NEGATIVE: 1
ALLERGIC/IMMUNOLOGIC NEGATIVE: 1
BACK PAIN: 1
VOMITING: 0
CONSTIPATION: 1

## 2021-03-23 ASSESSMENT — PATIENT HEALTH QUESTIONNAIRE - PHQ9
2. FEELING DOWN, DEPRESSED OR HOPELESS: 0
SUM OF ALL RESPONSES TO PHQ QUESTIONS 1-9: 0
SUM OF ALL RESPONSES TO PHQ QUESTIONS 1-9: 0
1. LITTLE INTEREST OR PLEASURE IN DOING THINGS: 0

## 2021-03-23 NOTE — PROGRESS NOTES
Subjective:      Patient ID: Sonal Jordan is a 79 y.o. male. HPI  Here today for follow up of chronic problems as per HPI and as problems listed under assessment and plan,no new c/o feels good  Did get COVID vaccination     Hypertension  This is a chronic problem. The current episode started more than 1 year ago. The problem is unchanged. The problem is controlled. Associated symptoms include anxiety and palpitations. Chest pain: occ . sx  Risk factors for coronary artery disease include dyslipidemia, obesity and male gender. Past treatments include beta blockers, direct vasodilators, lifestyle changes and diuretics. The current treatment provides significant improvement. There are no compliance problems. Hypertensive end-organ damage includes CAD/MI. Hyperlipidemia  This is a chronic problem. The current episode started more than 1 year ago. The problem is uncontrolled (? past compliance with diet and meds ). Chest pain: occ . sx  Current antihyperlipidemic treatment includes diet change, exercise and statins. The current treatment provides moderate improvement of lipids. Compliance problems include adherence to exercise, psychosocial issues and adherence to diet. Risk factors for coronary artery disease include dyslipidemia, hypertension, male sex and obesity (known CAD ). Allergies   Allergen Reactions    Sulfa Antibiotics        Current Outpatient Medications   Medication Sig Dispense Refill    lisinopril (PRINIVIL;ZESTRIL) 20 MG tablet TAKE ONE TABLET BY MOUTH DAILY 90 tablet 1    HYDROcodone-acetaminophen (NORCO) 5-325 MG per tablet Take 1 tablet by mouth every 6 hours as needed for Pain for up to 30 days. 30 tablet 0    zolpidem (AMBIEN) 10 MG tablet Take 1 tablet by mouth nightly as needed for Sleep for up to 30 days.  90 tablet 0    atorvastatin (LIPITOR) 40 MG tablet Take 1 tablet by mouth daily 90 tablet 1    colchicine (COLCRYS) 0.6 MG tablet TAKE ONE TABLET BY MOUTH DAILY 90 tablet 1    allopurinol (ZYLOPRIM) 300 MG tablet TAKE ONE TABLET BY MOUTH DAILY 90 tablet 3    isosorbide mononitrate (IMDUR) 60 MG extended release tablet Take 2 tablets by mouth daily 180 tablet 3    nitroGLYCERIN (NITROSTAT) 0.4 MG SL tablet Place 1 tablet under the tongue every 5 minutes as needed for Chest pain 25 tablet 3    hydrochlorothiazide (HYDRODIURIL) 25 MG tablet TAKE ONE TABLET BY MOUTH DAILY 90 tablet 1    Polyethylene Glycol 3350 (MIRALAX PO) Take by mouth      amLODIPine (NORVASC) 5 MG tablet TAKE THREE TABLETS BY MOUTH DAILY 90 tablet 1    vitamin D (CHOLECALCIFEROL) 1000 UNIT TABS tablet Take 2,000 Int'l Units by mouth daily.  aspirin 325 MG tablet Take 325 mg by mouth daily.  finasteride (PROSCAR) 5 MG tablet Take 5 mg by mouth daily.  clotrimazole-betamethasone (LOTRISONE) cream Apply  topically 2 times daily.  fish oil-omega-3 fatty acids 1000 MG capsule Take 2 g by mouth daily.  naftifine (NAFTIN) 1 % cream Apply  topically daily. No current facility-administered medications for this visit.         Past Medical History:   Diagnosis Date    Contact dermatitis and other eczema, due to unspecified cause     Coronary atherosclerosis of unspecified type of vessel, native or graft     Cardio- Dr. Ruddy Dickson Gout, unspecified     Hypertension     Hypertrophy of prostate without urinary obstruction and other lower urinary tract symptoms (LUTS)     Other and unspecified hyperlipidemia     Other chest pain     Pain in limb     Foot Pain    Peripheral vascular complications     Screening PSA (prostate specific antigen) 2/19/2009    1.5    Stool blood 7/21/2008    Guiac Negative       Family History   Problem Relation Age of Onset    Coronary Art Dis Father        Past Surgical History:   Procedure Laterality Date    CARDIAC SURGERY      4 vessel cabg Dr. Janet Tavera y    COLONOSCOPY  2/16/07    Dr. Rochell Baumgarten 2/2012    COLONOSCOPY  5.23.13    Sidney Christensen MD polyp repeat 2018    COLONOSCOPY  2018    Dr. Eri Quintanilla polyp removed no repeat necessary   [address]      Dr. Torres Ratel History     Socioeconomic History    Marital status:      Spouse name: Not on file    Number of children: Not on file    Years of education: Not on file    Highest education level: Not on file   Occupational History    Not on file   Social Needs    Financial resource strain: Not on file    Food insecurity     Worry: Not on file     Inability: Not on file    Transportation needs     Medical: Not on file     Non-medical: Not on file   Tobacco Use    Smoking status: Former Smoker     Packs/day: 1.50     Years: 50.00     Pack years: 75.00     Types: Cigarettes     Quit date: 2003     Years since quittin.2    Smokeless tobacco: Former User   Substance and Sexual Activity    Alcohol use: Yes     Comment: Seldom    Drug use: No    Sexual activity: Yes     Partners: Female   Lifestyle    Physical activity     Days per week: Not on file     Minutes per session: Not on file    Stress: Not on file   Relationships    Social connections     Talks on phone: Not on file     Gets together: Not on file     Attends Taoism service: Not on file     Active member of club or organization: Not on file     Attends meetings of clubs or organizations: Not on file     Relationship status: Not on file    Intimate partner violence     Fear of current or ex partner: Not on file     Emotionally abused: Not on file     Physically abused: Not on file     Forced sexual activity: Not on file   Other Topics Concern    Not on file   Social History Narrative    Not on file       Review of Systems  Review of Systems   Constitutional: Positive for unexpected weight change (up 1 #). HENT: Negative. Eyes: Negative. Respiratory: Negative. Cardiovascular: Positive for palpitations. Negative for leg swelling.  Chest pain: occ .sx    Gastrointestinal: Positive for constipation (improved ). Negative for vomiting. Constipation improved    Endocrine: Negative. Genitourinary: Positive for frequency. BPH as per Dr Blake Mills    Musculoskeletal: Positive for arthralgias and back pain. Skin: Negative. Mult moles  ? ? Changes will refer to derm    Allergic/Immunologic: Negative. Neurological: Positive for dizziness and light-headedness. Negative for tremors, weakness and numbness. Hematological: Negative. Psychiatric/Behavioral: Positive for sleep disturbance. Objective:   Physical Exam:  Physical Exam  Constitutional:       Appearance: He is well-developed. HENT:      Head: Normocephalic and atraumatic. Right Ear: External ear normal.      Left Ear: External ear normal.   Eyes:      Conjunctiva/sclera: Conjunctivae normal.      Pupils: Pupils are equal, round, and reactive to light. Comments: L Cataract as per Dr Dominique Mooney    Neck:      Musculoskeletal: Normal range of motion and neck supple. Thyroid: No thyromegaly. Trachea: No tracheal deviation. Cardiovascular:      Rate and Rhythm: Normal rate and regular rhythm. Pulses: Normal pulses. Heart sounds: Normal heart sounds. No murmur. Pulmonary:      Effort: Pulmonary effort is normal.      Breath sounds: Normal breath sounds. Genitourinary:     Comments:    Musculoskeletal: Normal range of motion. Skin:     General: Skin is warm and dry. Comments: Mult moles over body ? Changes in shape and color    Neurological:      General: No focal deficit present. Mental Status: He is alert. Mental status is at baseline. He is disoriented. Cranial Nerves: No cranial nerve deficit. Deep Tendon Reflexes: Reflexes are normal and symmetric. Reflexes normal.   Psychiatric:         Mood and Affect: Mood normal.         Behavior: Behavior normal.         Thought Content:  Thought content normal.         /60 (Site: Right Upper Arm, Position: Sitting, Cuff Size: Medium Adult)   Pulse 66   Temp 97.5 °F (36.4 °C) (Temporal)   Resp 16   Ht 5' 7\" (1.702 m)   Wt 219 lb 9.6 oz (99.6 kg)   BMI 34.39 kg/m²       Assessment & Plan:         Gout  No recent symptoms cont diet and meds check labs     Coronary atherosclerosis  No symptoms cont meds - diert and exercise cont F/U as needed with cardiology     Benign non-nodular prostatic hyperplasia with lower urinary tract symptoms  Remains stable     Hyperglycemia  Cont diet check labs     Hypertension  Stable continue current meds and return in 3 mo.    needs to check BP at home labs pending     Hyperlipidemia  Is more compliant with meds will check labs     Other constipation  Controled with diet and meds     Vertigo  occ symptoms prn meds

## 2021-07-14 ENCOUNTER — OFFICE VISIT (OUTPATIENT)
Dept: INTERNAL MEDICINE CLINIC | Age: 81
End: 2021-07-14
Payer: COMMERCIAL

## 2021-07-14 VITALS
BODY MASS INDEX: 33.97 KG/M2 | DIASTOLIC BLOOD PRESSURE: 74 MMHG | SYSTOLIC BLOOD PRESSURE: 116 MMHG | HEART RATE: 66 BPM | RESPIRATION RATE: 14 BRPM | WEIGHT: 216.4 LBS | HEIGHT: 67 IN

## 2021-07-14 DIAGNOSIS — N40.1 BENIGN NON-NODULAR PROSTATIC HYPERPLASIA WITH LOWER URINARY TRACT SYMPTOMS: ICD-10-CM

## 2021-07-14 DIAGNOSIS — I25.118 ATHEROSCLEROSIS OF NATIVE CORONARY ARTERY OF NATIVE HEART WITH OTHER FORM OF ANGINA PECTORIS (HCC): ICD-10-CM

## 2021-07-14 DIAGNOSIS — M1A.9XX0 CHRONIC GOUT WITHOUT TOPHUS, UNSPECIFIED CAUSE, UNSPECIFIED SITE: ICD-10-CM

## 2021-07-14 DIAGNOSIS — Z00.00 WELL ADULT EXAM: Primary | ICD-10-CM

## 2021-07-14 DIAGNOSIS — R73.9 HYPERGLYCEMIA: ICD-10-CM

## 2021-07-14 DIAGNOSIS — Z00.00 ROUTINE GENERAL MEDICAL EXAMINATION AT A HEALTH CARE FACILITY: ICD-10-CM

## 2021-07-14 DIAGNOSIS — I10 ESSENTIAL HYPERTENSION: ICD-10-CM

## 2021-07-14 DIAGNOSIS — R42 VERTIGO: ICD-10-CM

## 2021-07-14 DIAGNOSIS — E78.2 MIXED HYPERLIPIDEMIA: ICD-10-CM

## 2021-07-14 DIAGNOSIS — K59.09 OTHER CONSTIPATION: ICD-10-CM

## 2021-07-14 DIAGNOSIS — Z86.010 HX OF COLONIC POLYP: ICD-10-CM

## 2021-07-14 PROCEDURE — G8417 CALC BMI ABV UP PARAM F/U: HCPCS | Performed by: INTERNAL MEDICINE

## 2021-07-14 PROCEDURE — 1123F ACP DISCUSS/DSCN MKR DOCD: CPT | Performed by: INTERNAL MEDICINE

## 2021-07-14 PROCEDURE — G0439 PPPS, SUBSEQ VISIT: HCPCS | Performed by: INTERNAL MEDICINE

## 2021-07-14 PROCEDURE — G8427 DOCREV CUR MEDS BY ELIG CLIN: HCPCS | Performed by: INTERNAL MEDICINE

## 2021-07-14 PROCEDURE — 1036F TOBACCO NON-USER: CPT | Performed by: INTERNAL MEDICINE

## 2021-07-14 PROCEDURE — 99214 OFFICE O/P EST MOD 30 MIN: CPT | Performed by: INTERNAL MEDICINE

## 2021-07-14 PROCEDURE — 4040F PNEUMOC VAC/ADMIN/RCVD: CPT | Performed by: INTERNAL MEDICINE

## 2021-07-14 RX ORDER — HYDROCHLOROTHIAZIDE 25 MG/1
TABLET ORAL
Qty: 90 TABLET | Refills: 1 | Status: ON HOLD | OUTPATIENT
Start: 2021-07-14 | End: 2022-03-19 | Stop reason: HOSPADM

## 2021-07-14 SDOH — ECONOMIC STABILITY: FOOD INSECURITY: WITHIN THE PAST 12 MONTHS, YOU WORRIED THAT YOUR FOOD WOULD RUN OUT BEFORE YOU GOT MONEY TO BUY MORE.: NEVER TRUE

## 2021-07-14 SDOH — ECONOMIC STABILITY: FOOD INSECURITY: WITHIN THE PAST 12 MONTHS, THE FOOD YOU BOUGHT JUST DIDN'T LAST AND YOU DIDN'T HAVE MONEY TO GET MORE.: NEVER TRUE

## 2021-07-14 ASSESSMENT — ENCOUNTER SYMPTOMS
CONSTIPATION: 1
ALLERGIC/IMMUNOLOGIC NEGATIVE: 1
EYES NEGATIVE: 1
RESPIRATORY NEGATIVE: 1
BACK PAIN: 1
VOMITING: 0

## 2021-07-14 ASSESSMENT — PATIENT HEALTH QUESTIONNAIRE - PHQ9
SUM OF ALL RESPONSES TO PHQ QUESTIONS 1-9: 0
1. LITTLE INTEREST OR PLEASURE IN DOING THINGS: 0
2. FEELING DOWN, DEPRESSED OR HOPELESS: 0
SUM OF ALL RESPONSES TO PHQ9 QUESTIONS 1 & 2: 0
SUM OF ALL RESPONSES TO PHQ QUESTIONS 1-9: 0
SUM OF ALL RESPONSES TO PHQ QUESTIONS 1-9: 0

## 2021-07-14 ASSESSMENT — LIFESTYLE VARIABLES
HOW OFTEN DO YOU HAVE SIX OR MORE DRINKS ON ONE OCCASION: 0
AUDIT-C TOTAL SCORE: 1
HOW OFTEN DO YOU HAVE A DRINK CONTAINING ALCOHOL: 1
HOW MANY STANDARD DRINKS CONTAINING ALCOHOL DO YOU HAVE ON A TYPICAL DAY: 0

## 2021-07-14 ASSESSMENT — SOCIAL DETERMINANTS OF HEALTH (SDOH): HOW HARD IS IT FOR YOU TO PAY FOR THE VERY BASICS LIKE FOOD, HOUSING, MEDICAL CARE, AND HEATING?: NOT HARD AT ALL

## 2021-07-14 NOTE — ASSESSMENT & PLAN NOTE
Within normal limits for age- cont to work no ADL issues,immunizations up to date refuses Flu , no depression ,no cognitive impairment  Colonoscopy up to date repeat 2018-polyp no repeat needed 2nd to age    Eye exam up to date  Exercises as tolerated    No living will but does not want resuscitation   Findings and recommendations discussed with Pt

## 2021-07-14 NOTE — ASSESSMENT & PLAN NOTE
Monitored by specialist- no acute findings meriting change in the plan no new symptoms cont meds and F/U

## 2021-07-14 NOTE — ASSESSMENT & PLAN NOTE
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

## 2021-07-14 NOTE — ASSESSMENT & PLAN NOTE
Well-controlled, continue current medications and lifestyle modifications recommended needs to check BP at home prn

## 2021-07-14 NOTE — PROGRESS NOTES
Subjective:      Patient ID: Godfrey Fernandez is a 79 y.o. male. HPI  Here today for medicare complete physical and review of chronic problems as listed under assessment and plan,no new c/o feels good     Hypertension  This is a chronic problem. The current episode started more than 1 year ago. The problem is unchanged. The problem is controlled. Associated symptoms include palpitations. Chest pain: occ .sx  There are no associated agents to hypertension. Risk factors for coronary artery disease include dyslipidemia, obesity and male gender. Past treatments include ACE inhibitors, diuretics, beta blockers and lifestyle changes. The current treatment provides significant improvement. There are no compliance problems. Hypertensive end-organ damage includes CAD/MI. Hyperlipidemia  This is a chronic problem. The current episode started more than 1 year ago. The problem is controlled. Recent lipid tests were reviewed and are normal. There are no known factors aggravating his hyperlipidemia. Chest pain: occ . sx  Current antihyperlipidemic treatment includes diet change, exercise and statins. The current treatment provides significant improvement of lipids. There are no compliance problems. Risk factors for coronary artery disease include dyslipidemia, male sex, obesity and hypertension (known CAD).        Allergies   Allergen Reactions    Sulfa Antibiotics        Current Outpatient Medications   Medication Sig Dispense Refill    lisinopril (PRINIVIL;ZESTRIL) 20 MG tablet TAKE ONE TABLET BY MOUTH DAILY 90 tablet 1    atorvastatin (LIPITOR) 40 MG tablet Take 1 tablet by mouth daily 90 tablet 1    colchicine (COLCRYS) 0.6 MG tablet TAKE ONE TABLET BY MOUTH DAILY 90 tablet 1    allopurinol (ZYLOPRIM) 300 MG tablet TAKE ONE TABLET BY MOUTH DAILY 90 tablet 3    isosorbide mononitrate (IMDUR) 60 MG extended release tablet Take 2 tablets by mouth daily 180 tablet 3    nitroGLYCERIN (NITROSTAT) 0.4 MG SL tablet Place 1 tablet under the tongue every 5 minutes as needed for Chest pain 25 tablet 3    hydrochlorothiazide (HYDRODIURIL) 25 MG tablet TAKE ONE TABLET BY MOUTH DAILY 90 tablet 1    Polyethylene Glycol 3350 (MIRALAX PO) Take by mouth      amLODIPine (NORVASC) 5 MG tablet TAKE THREE TABLETS BY MOUTH DAILY 90 tablet 1    vitamin D (CHOLECALCIFEROL) 1000 UNIT TABS tablet Take 2,000 Int'l Units by mouth daily.  aspirin 325 MG tablet Take 325 mg by mouth daily.  finasteride (PROSCAR) 5 MG tablet Take 5 mg by mouth daily.  clotrimazole-betamethasone (LOTRISONE) cream Apply  topically 2 times daily.  fish oil-omega-3 fatty acids 1000 MG capsule Take 2 g by mouth daily.  naftifine (NAFTIN) 1 % cream Apply  topically daily. No current facility-administered medications for this visit.        Past Medical History:   Diagnosis Date    Contact dermatitis and other eczema, due to unspecified cause     Coronary atherosclerosis of unspecified type of vessel, native or graft     Cardio- Dr. Kyler Mack Gout, unspecified     Hypertension     Hypertrophy of prostate without urinary obstruction and other lower urinary tract symptoms (LUTS)     Other and unspecified hyperlipidemia     Other chest pain     Pain in limb     Foot Pain    Peripheral vascular complications     Screening PSA (prostate specific antigen) 2/19/2009    1.5    Stool blood 7/21/2008    Guiac Negative       Family History   Problem Relation Age of Onset    Coronary Art Dis Father        Past Surgical History:   Procedure Laterality Date    CARDIAC SURGERY      4 vessel cabg Dr. Paige Salmeron y    COLONOSCOPY  2/16/07    Dr. Avery Solorzano 2/2012    COLONOSCOPY  5.23.13    Lissy Pham MD polyp repeat 2018    COLONOSCOPY  06/18/2018    Dr. Yared Allen polyp removed no repeat necessary   [facility] [facility]  2003    Dr. Amira Dasilva History     Socioeconomic History    Marital status:      Spouse name: Not on file  Number of children: Not on file    Years of education: Not on file    Highest education level: Not on file   Occupational History    Not on file   Tobacco Use    Smoking status: Former Smoker     Packs/day: 1.50     Years: 50.00     Pack years: 75.00     Types: Cigarettes     Quit date: 2003     Years since quittin.5    Smokeless tobacco: Former User   Substance and Sexual Activity    Alcohol use: Yes     Comment: Seldom    Drug use: No    Sexual activity: Yes     Partners: Female   Other Topics Concern    Not on file   Social History Narrative    Not on file     Social Determinants of Health     Financial Resource Strain: Low Risk     Difficulty of Paying Living Expenses: Not hard at all   Food Insecurity: No Food Insecurity    Worried About [address] in the Last Year: Never true    [address] [employer] in the Last Year: Never true   Transportation Needs:     Lack of Transportation (Medical):  Lack of Transportation (Non-Medical):    Physical Activity:     Days of Exercise per Week:     Minutes of Exercise per Session:    Stress:     Feeling of Stress :    Social Connections:     Frequency of Communication with Friends and Family:     Frequency of Social Gatherings with Friends and Family:     Attends Cheondo[employer] Services:     Active Member of Clubs or Organizations:     Attends Club or Organization Meetings:     Marital Status:    Intimate Partner Violence:     Fear of Current or Ex-Partner:     Emotionally Abused:     Physically Abused:     Sexually Abused:        Review of Systems  Review of Systems   Constitutional: Positive for unexpected weight change (up 1 #). HENT: Negative. Eyes: Negative. Respiratory: Negative. Cardiovascular: Positive for palpitations. Negative for leg swelling. Chest pain: occ .sx    Gastrointestinal: Positive for constipation (improved ). Negative for vomiting. Constipation improved    Endocrine: Negative. Genitourinary: Positive for frequency. BPH as per Dr Ana Boo    Musculoskeletal: Positive for arthralgias and back pain. Skin: Negative. Mult moles  ? ? Changes will refer to derm    Allergic/Immunologic: Negative. Neurological: Positive for dizziness and light-headedness. Negative for tremors, weakness and numbness. Hematological: Negative. Psychiatric/Behavioral: Positive for sleep disturbance. Objective:   Physical Exam:  Physical Exam  Constitutional:       Appearance: He is well-developed. HENT:      Head: Normocephalic and atraumatic. Right Ear: Tympanic membrane, ear canal and external ear normal.      Left Ear: Tympanic membrane, ear canal and external ear normal.   Eyes:      Conjunctiva/sclera: Conjunctivae normal.      Pupils: Pupils are equal, round, and reactive to light. Neck:      Thyroid: No thyromegaly. Trachea: No tracheal deviation. Cardiovascular:      Rate and Rhythm: Normal rate and regular rhythm. Heart sounds: Normal heart sounds. No murmur heard. Pulmonary:      Effort: Pulmonary effort is normal.      Breath sounds: Normal breath sounds. Abdominal:      General: Abdomen is flat. Bowel sounds are normal.      Palpations: Abdomen is soft. Comments: Obese    Genitourinary:     Comments: Per Dr Moriah Elam   Musculoskeletal:         General: Normal range of motion. Cervical back: Normal range of motion and neck supple. Skin:     General: Skin is warm and dry. Comments: Lipoma L axilla  Not tender    Neurological:      Mental Status: He is alert and oriented to person, place, and time. Deep Tendon Reflexes: Reflexes are normal and symmetric. Psychiatric:         Mood and Affect: Mood normal.         Behavior: Behavior normal.         Thought Content:  Thought content normal.         Judgment: Judgment normal.         /74 (Site: Right Upper Arm, Position: Sitting, Cuff Size: Medium Adult)   Pulse 66   Resp 14   Ht 5' 7\" (1.702 m)   Wt 216 lb 6.4 oz (98.2 kg)   BMI 33.89 kg/m²       Assessment & Plan:       Vertigo  occ symptoms prn meds     Other constipation  Controled with diet and prn meds     Hypertension   Well-controlled, continue current medications and lifestyle modifications recommended needs to check BP at home prn     Hyperlipidemia   Borderline controlled, continue current medications and lifestyle modifications recommended Repeat labs in 3 mo.  Near goal     Hyperglycemia   Diet controled     Hx of colonic polyp   As noted cont to watch no repeat needed     Gout   Well-controlled, continue current medications and lifestyle modifications recommended ie diet and meds     Coronary atherosclerosis   Monitored by specialist- no acute findings meriting change in the plan no new symptoms cont meds and F/U    Benign non-nodular prostatic hyperplasia with lower urinary tract symptoms   Monitored by specialist- no acute findings meriting change in the plan meds as noted     Well adult exam  Within normal limits for age- cont to work no ADL issues,immunizations up to date refuses Flu , no depression ,no cognitive impairment  Colonoscopy up to date repeat 2018-polyp no repeat needed 2nd to age    Eye exam up to date  Exercises as tolerated    No living will but does not want resuscitation   Findings and recommendations discussed with Zachary Self  1940    Allergies   Allergen Reactions    Sulfa Antibiotics      Current Outpatient Medications   Medication Sig Dispense Refill    lisinopril (PRINIVIL;ZESTRIL) 20 MG tablet TAKE ONE TABLET BY MOUTH DAILY 90 tablet 1    atorvastatin (LIPITOR) 40 MG tablet Take 1 tablet by mouth daily 90 tablet 1    colchicine (COLCRYS) 0.6 MG tablet TAKE ONE TABLET BY MOUTH DAILY 90 tablet 1    allopurinol (ZYLOPRIM) 300 MG tablet TAKE ONE TABLET BY MOUTH DAILY 90 tablet 3    isosorbide mononitrate (IMDUR) 60 MG extended release tablet Take 2 tablets by mouth daily 180 tablet 3  nitroGLYCERIN (NITROSTAT) 0.4 MG SL tablet Place 1 tablet under the tongue every 5 minutes as needed for Chest pain 25 tablet 3    hydrochlorothiazide (HYDRODIURIL) 25 MG tablet TAKE ONE TABLET BY MOUTH DAILY 90 tablet 1    Polyethylene Glycol 3350 (MIRALAX PO) Take by mouth      amLODIPine (NORVASC) 5 MG tablet TAKE THREE TABLETS BY MOUTH DAILY 90 tablet 1    vitamin D (CHOLECALCIFEROL) 1000 UNIT TABS tablet Take 2,000 Int'l Units by mouth daily.  aspirin 325 MG tablet Take 325 mg by mouth daily.  finasteride (PROSCAR) 5 MG tablet Take 5 mg by mouth daily.  clotrimazole-betamethasone (LOTRISONE) cream Apply  topically 2 times daily.  fish oil-omega-3 fatty acids 1000 MG capsule Take 2 g by mouth daily.  naftifine (NAFTIN) 1 % cream Apply  topically daily. No current facility-administered medications for this visit. Vitals:    07/14/21 1406   BP: 116/74   Site: Right Upper Arm   Position: Sitting   Cuff Size: Medium Adult   Pulse: 66   Resp: 14   Weight: 216 lb 6.4 oz (98.2 kg)   Height: 5' 7\" (1.702 m)     Body mass index is 33.89 kg/m². Wt Readings from Last 3 Encounters:   07/14/21 216 lb 6.4 oz (98.2 kg)   03/23/21 219 lb 9.6 oz (99.6 kg)   08/24/20 218 lb 12.8 oz (99.2 kg)     BP Readings from Last 3 Encounters:   07/14/21 116/74   03/23/21 110/60   08/24/20 124/78       Consultants:   Patient Care Team:  Nicholas Bustillo MD as PCP - General (Internal Medicine)  Nicholas Bustillo MD as PCP - [facility]  Mahendra Paez MD as Consulting Physician (Dermatology)  Rocio Chu MD as Consulting Physician (Urology)  Osorio Youngblood MD as Consulting Physician (Interventional Cardiology)    Chief Complaint:   Shalini Whitten is a 79 y.o. male who presents for Medicare Preventive Physical Examination with Personalized Prevention Plan Services.     HPI: Tr review listed chronic problems     Patient Active Problem List   Diagnosis    Gout    Coronary atherosclerosis    Benign non-nodular prostatic hyperplasia with lower urinary tract symptoms    Pain in limb    Hyperglycemia    Hypertension    Hx of colonic polyp    Chest pain    Well adult exam    Hyperlipidemia    Other constipation    Multiple atypical skin moles    Vertigo    Age-related cataract of right eye       Mood Disorders Screen:  Risk factors: negative life event mult med problems  Symptoms:  endorses none, denies depressed mood, difficulty concentrating, difficulty sleeping, reduced interest in activities and changes in appetite     Safety Assessment:  Functional ability/ADLs:  Difficulty with bathing- no, grooming- no, meals- no, incontinence- no.  Driving- yes. Home safety: Lives with family wife . Number of stairs to enter home: 0 has elevator, within home: 0. Risk factors for falls: none. Home environment modifications:  no.     End of Life Planning:  Advanced Directive: Power of  for healthcare on file.       Preventive Care:  Self-testicular exams: Yes  Last PSA: 1.77, normal  Last colonoscopy: 2018, abnormal -polyp  AAA screening:  no   Last eye exam: 2020, glasses and s/p catatractys  Exercise: yes  Seatbelt use: yes      Immunization History   Administered Date(s) Administered    COVID-19, Pfizer, PF, 30mcg/0.3mL 02/25/2021, 03/18/2021    Pneumococcal Conjugate 13-valent (Latoshajason Antony) 05/17/2016    Td, unspecified formulation 05/17/2016       Past Medical History:   Diagnosis Date    Contact dermatitis and other eczema, due to unspecified cause     Coronary atherosclerosis of unspecified type of vessel, native or graft     Cardio- Dr. Katarina Mcclure Gout, unspecified     Hypertension     Hypertrophy of prostate without urinary obstruction and other lower urinary tract symptoms (LUTS)     Other and unspecified hyperlipidemia     Other chest pain     Pain in limb     Foot Pain    Peripheral vascular complications     Screening PSA (prostate specific antigen) 2009    1.5    Stool blood 2008    Guiac Negative     Past Surgical History:   Procedure Laterality Date    CARDIAC SURGERY      4 vessel cabg Dr. Jair chester    COLONOSCOPY  07    Dr. Khari Hernandez 2012    COLONOSCOPY  5.23.13    Nahid Davidson MD polyp repeat 2018    COLONOSCOPY  2018    Dr. Heron Vail polyp removed no repeat necessary   [address]      Dr. Christen Dandy     Family History   Problem Relation Age of Onset    Coronary Art Dis Father      Social History     Socioeconomic History    Marital status:      Spouse name: Not on file    Number of children: Not on file    Years of education: Not on file    Highest education level: Not on file   Occupational History    Not on file   Tobacco Use    Smoking status: Former Smoker     Packs/day: 1.50     Years: 50.00     Pack years: 75.00     Types: Cigarettes     Quit date: 2003     Years since quittin.5    Smokeless tobacco: Former User   Substance and Sexual Activity    Alcohol use: Yes     Comment: Seldom    Drug use: No    Sexual activity: Yes     Partners: Female   Other Topics Concern    Not on file   Social History Narrative    Not on file     Social Determinants of Health     Financial Resource Strain: Low Risk     Difficulty of Paying Living Expenses: Not hard at all   Food Insecurity: No Food Insecurity    Worried About [address] in the Last Year: Never true    [address] in the Last Year: Never true   Transportation Needs:     Lack of Transportation (Medical):      Lack of Transportation (Non-Medical):    Physical Activity:     Days of Exercise per Week:     Minutes of Exercise per Session:    Stress:     Feeling of Stress :    Social Connections:     Frequency of Communication with Friends and Family:     Frequency of Social Gatherings with Friends and Family:     Attends [employer] Services:     Active Member of Clubs or Organizations:     Attends Club or Organization Meetings:     Marital Status:    Intimate Partner Violence:     Fear of Current or Ex-Partner:     Emotionally Abused:     Physically Abused:     Sexually Abused:      }        Visual Acuity: Corrected:            L  20/25            R 20/25    Cognitive Screening:  Clock drawing test score: 5/5. Mini-mental status exam score: NA. Assessment/Plan:  Hilda Monterroso was seen today for medicare awv. Diagnoses and all orders for this visit:    Well adult exam    Vertigo  -     CBC Auto Differential; Future  -     Comprehensive Metabolic Panel; Future  -     Hemoglobin A1C; Future  -     Lipid Panel; Future  -     TSH with Reflex; Future    Other constipation  -     CBC Auto Differential; Future  -     Comprehensive Metabolic Panel; Future  -     Hemoglobin A1C; Future  -     Lipid Panel; Future  -     TSH with Reflex; Future    Essential hypertension  -     CBC Auto Differential; Future  -     Comprehensive Metabolic Panel; Future  -     Hemoglobin A1C; Future  -     Lipid Panel; Future  -     TSH with Reflex; Future    Mixed hyperlipidemia  -     CBC Auto Differential; Future  -     Comprehensive Metabolic Panel; Future  -     Hemoglobin A1C; Future  -     Lipid Panel; Future  -     TSH with Reflex; Future    Hyperglycemia  -     CBC Auto Differential; Future  -     Comprehensive Metabolic Panel; Future  -     Hemoglobin A1C; Future  -     Lipid Panel; Future  -     TSH with Reflex; Future    Hx of colonic polyp  -     CBC Auto Differential; Future  -     Comprehensive Metabolic Panel; Future  -     Hemoglobin A1C; Future  -     Lipid Panel; Future  -     TSH with Reflex; Future    Chronic gout without tophus, unspecified cause, unspecified site  -     CBC Auto Differential; Future  -     Comprehensive Metabolic Panel; Future  -     Hemoglobin A1C; Future  -     Lipid Panel; Future  -     TSH with Reflex;  Future    Atherosclerosis of native coronary artery of native heart with other form of angina pectoris (HCC)  -     CBC Auto Differential; Future  -     Comprehensive Metabolic Panel; Future  -     Hemoglobin A1C; Future  -     Lipid Panel; Future  -     TSH with Reflex; Future    Benign non-nodular prostatic hyperplasia with lower urinary tract symptoms  -     CBC Auto Differential; Future  -     Comprehensive Metabolic Panel; Future  -     Hemoglobin A1C; Future  -     Lipid Panel; Future  -     TSH with Reflex; Future      Medicare Annual Wellness Visit  Name: Vishnu Salas Date: 2021   MRN: <[id number]> Sex: Male   Age: 79 y.o. Ethnicity: Non-/Non    : 1940 Race: Elo Mccann is here for Health Information Designs    Screenings for behavioral, psychosocial and functional/safety risks, and cognitive dysfunction are all negative except as indicated below. These results, as well as other patient data from the [address] form, are documented in Flowsheets linked to this Encounter. Allergies   Allergen Reactions    Sulfa Antibiotics        Prior to Visit Medications    Medication Sig Taking?  Authorizing Provider   lisinopril (PRINIVIL;ZESTRIL) 20 MG tablet TAKE ONE TABLET BY MOUTH DAILY Yes Christian Mendoza MD   atorvastatin (LIPITOR) 40 MG tablet Take 1 tablet by mouth daily Yes Christian Mendoza MD   colchicine (COLCRYS) 0.6 MG tablet TAKE ONE TABLET BY MOUTH DAILY Yes Christian Mendoza MD   allopurinol (ZYLOPRIM) 300 MG tablet TAKE ONE TABLET BY MOUTH DAILY Yes Victor Manuel Staples MD   isosorbide mononitrate (IMDUR) 60 MG extended release tablet Take 2 tablets by mouth daily Yes Victor Manuel Staples MD   nitroGLYCERIN (NITROSTAT) 0.4 MG SL tablet Place 1 tablet under the tongue every 5 minutes as needed for Chest pain Yes Victor Manuel Staples MD   hydrochlorothiazide (HYDRODIURIL) 25 MG tablet TAKE ONE TABLET BY MOUTH DAILY Yes Christian Mendoza MD   Polyethylene Glycol 3350 (MIRALAX PO) Take by mouth Yes Historical Provider, MD   amLODIPine (NORVASC) 5 MG tablet TAKE THREE TABLETS BY MOUTH DAILY Yes Brendan Dupont MD   vitamin D (CHOLECALCIFEROL) 1000 UNIT TABS tablet Take 2,000 Int'l Units by mouth daily. Yes Historical Provider, MD   aspirin 325 MG tablet Take 325 mg by mouth daily. Yes Historical Provider, MD   finasteride (PROSCAR) 5 MG tablet Take 5 mg by mouth daily. Yes Historical Provider, MD   clotrimazole-betamethasone (LOTRISONE) cream Apply  topically 2 times daily. Yes Historical Provider, MD   fish oil-omega-3 fatty acids 1000 MG capsule Take 2 g by mouth daily. Yes Historical Provider, MD   naftifine (NAFTIN) 1 % cream Apply  topically daily.  Yes Historical Provider, MD       Past Medical History:   Diagnosis Date    Contact dermatitis and other eczema, due to unspecified cause     Coronary atherosclerosis of unspecified type of vessel, native or graft     Cardio- Dr. Steven Howe Gout, unspecified     Hypertension     Hypertrophy of prostate without urinary obstruction and other lower urinary tract symptoms (LUTS)     Other and unspecified hyperlipidemia     Other chest pain     Pain in limb     Foot Pain    Peripheral vascular complications     Screening PSA (prostate specific antigen) 2/19/2009    1.5    Stool blood 7/21/2008    Guiac Negative       Past Surgical History:   Procedure Laterality Date    CARDIAC SURGERY      4 vessel cabg Dr. Rose Mary Alva y    COLONOSCOPY  2/16/07    Dr. Breanne Freitas 2/2012    COLONOSCOPY  5.23.13    Sunil Light MD polyp repeat 2018    COLONOSCOPY  06/18/2018    Dr. Mikaela Syed polyp removed no repeat necessary   [address]  [address]    Dr. Zulma Nunn       Family History   Problem Relation Age of Onset    Coronary Art Dis Father        CareTeam (Including outside providers/suppliers regularly involved in providing care):   Patient Care Team:  Brendan Dupont MD as PCP - General (Internal Medicine)  Brendan Dupont MD as PCP - [facility]an[facility] Provider  Petra Jay MD as Consulting Physician (Dermatology)  Kayleen Brandon Monika Torres MD as Consulting Physician (Urology)  Ana Lilia Cortez MD as Consulting Physician (Interventional Cardiology)    Wt Readings from Last 3 Encounters:   07/14/21 216 lb 6.4 oz (98.2 kg)   03/23/21 219 lb 9.6 oz (99.6 kg)   08/24/20 218 lb 12.8 oz (99.2 kg)     Vitals:    07/14/21 1406   BP: 116/74   Site: Right Upper Arm   Position: Sitting   Cuff Size: Medium Adult   Pulse: 66   Resp: 14   Weight: 216 lb 6.4 oz (98.2 kg)   Height: 5' 7\" (1.702 m)     Body mass index is 33.89 kg/m². Based upon direct observation of the patient, evaluation of cognition reveals recent and remote memory intact. Patient's complete Health Risk Assessment and screening values have been reviewed and are found in Flowsheets. The following problems were reviewed today and where indicated follow up appointments were made and/or referrals ordered. Positive Risk Factor Screenings with Interventions:          General Health and ACP:  General  In general, how would you say your health is?: Fair  In the past 7 days, have you experienced any of the following?  New or Increased Pain, New or Increased Fatigue, Loneliness, Social Isolation, Stress or Anger?: (!) New or Increased Pain, New or Increased Fatigue  Do you get the social and emotional support that you need?: Yes  Do you have a Living Will?: Yes  Advance Directives     Power of  Living Will ACP-Advance Directive ACP-Power of     Not on File Not on File Filed Not on File      General Health Risk Interventions:  · NA    Health Habits/Nutrition:  Health Habits/Nutrition  Do you exercise for at least 20 minutes 2-3 times per week?: Yes  Have you lost any weight without trying in the past 3 months?: No  Do you eat only one meal per day?: No  Have you seen the dentist within the past year?: (!) No  Body mass index: (!) 33.89  Health Habits/Nutrition Interventions:  · NA    Hearing/Vision:  No exam data present  Hearing/Vision  Do you or your family notice any trouble with your hearing that hasn't been managed with hearing aids?: (!) Yes  Do you have difficulty driving, watching TV, or doing any of your daily activities because of your eyesight?: No  Have you had an eye exam within the past year?: Yes  Hearing/Vision Interventions:  · Vision concerns:  patient encouraged to make appointment with his/her eye specialist    Safety:  Safety  Do you have working smoke detectors?: Yes  Have all throw rugs been removed or fastened?: (!) No  Do you have non-slip mats or surfaces in all bathtubs/showers?: Yes  Do all of your stairways have a railing or banister?: (!) No  Are your doorways, halls and stairs free of clutter?: Yes  Do you always fasten your seatbelt when you are in a car?: Yes  Safety Interventions:  · Home safety tips provided    ADL:  ADLs  In the past 7 days, did you need help from others to perform any of the following everyday activities? Eating, dressing, grooming, bathing, toileting, or walking/balance?: (!) Dressing, Walking/Balance  In the past 7 days, did you need help from others to take care of any of the following?  Laundry, housekeeping, banking/finances, shopping, telephone use, food preparation, transportation, or taking medications?: [employer] [place], Shopping, Food Preparation, Transportation, Taking Medications  ADL Interventions:  · Patient declines any further evaluation/treatment for this issue    Personalized Preventive Plan   Current Health Maintenance Status  Immunization History   Administered Date(s) Administered    COVID-19, Carmen Peter, PF, 30mcg/0.3mL 02/25/2021, 03/18/2021    Pneumococcal Conjugate 13-valent (Idella Sung) 05/17/2016    Td, unspecified formulation 05/17/2016        Health Maintenance   Topic Date Due    Hepatitis C screen  Never done    Shingles Vaccine (1 of 2) Never done    DTaP/Tdap/Td vaccine (1 - Tdap) 05/18/2016    Pneumococcal 65+ years Vaccine (2 of 2 - PPSV23) 05/17/2017    Annual Wellness Visit (AWV)  Never done    Flu vaccine (1) 09/01/2021    Lipid screen  04/30/2022    Potassium monitoring  04/30/2022    Creatinine monitoring  04/30/2022    COVID-19 Vaccine  Completed    Hepatitis A vaccine  Aged Out    Hepatitis B vaccine  Aged Out    Hib vaccine  Aged Out    Meningococcal (ACWY) vaccine  Aged Out     Recommendations for [employer] Due: see orders and patient instructions/AVS.  . Recommended screening schedule for the next 5-10 years is provided to the patient in written form: see Patient Instructions/AVS.    Jack Palmer was seen today for medicare awv. Diagnoses and all orders for this visit:    Well adult exam    Vertigo  -     CBC Auto Differential; Future  -     Comprehensive Metabolic Panel; Future  -     Hemoglobin A1C; Future  -     Lipid Panel; Future  -     TSH with Reflex; Future    Other constipation  -     CBC Auto Differential; Future  -     Comprehensive Metabolic Panel; Future  -     Hemoglobin A1C; Future  -     Lipid Panel; Future  -     TSH with Reflex; Future    Essential hypertension  -     CBC Auto Differential; Future  -     Comprehensive Metabolic Panel; Future  -     Hemoglobin A1C; Future  -     Lipid Panel; Future  -     TSH with Reflex; Future    Mixed hyperlipidemia  -     CBC Auto Differential; Future  -     Comprehensive Metabolic Panel; Future  -     Hemoglobin A1C; Future  -     Lipid Panel; Future  -     TSH with Reflex; Future    Hyperglycemia  -     CBC Auto Differential; Future  -     Comprehensive Metabolic Panel; Future  -     Hemoglobin A1C; Future  -     Lipid Panel; Future  -     TSH with Reflex; Future    Hx of colonic polyp  -     CBC Auto Differential; Future  -     Comprehensive Metabolic Panel; Future  -     Hemoglobin A1C; Future  -     Lipid Panel; Future  -     TSH with Reflex; Future    Chronic gout without tophus, unspecified cause, unspecified site  -     CBC Auto Differential; Future  -     Comprehensive Metabolic Panel;  Future  -     Hemoglobin A1C; Future  -     Lipid Panel; Future  -     TSH with Reflex; Future    Atherosclerosis of native coronary artery of native heart with other form of angina pectoris (HCC)  -     CBC Auto Differential; Future  -     Comprehensive Metabolic Panel; Future  -     Hemoglobin A1C; Future  -     Lipid Panel; Future  -     TSH with Reflex; Future    Benign non-nodular prostatic hyperplasia with lower urinary tract symptoms  -     CBC Auto Differential; Future  -     Comprehensive Metabolic Panel; Future  -     Hemoglobin A1C; Future  -     Lipid Panel; Future  -     TSH with Reflex;  Future

## 2021-07-14 NOTE — ASSESSMENT & PLAN NOTE
Borderline controlled, continue current medications and lifestyle modifications recommended Repeat labs in 3 mo.  Near goal

## 2021-07-14 NOTE — PATIENT INSTRUCTIONS
Personalized Preventive Plan for Alexandria Rey - 7/14/2021  Medicare offers a range of preventive health benefits. Some of the tests and screenings are paid in full while other may be subject to a deductible, co-insurance, and/or copay. Some of these benefits include a comprehensive review of your medical history including lifestyle, illnesses that may run in your family, and various assessments and screenings as appropriate. After reviewing your medical record and screening and assessments performed today your provider may have ordered immunizations, labs, imaging, and/or referrals for you. A list of these orders (if applicable) as well as your Preventive Care list are included within your After Visit Summary for your review. Other Preventive Recommendations:    · A preventive eye exam performed by an eye specialist is recommended every 1-2 years to screen for glaucoma; cataracts, macular degeneration, and other eye disorders. · A preventive dental visit is recommended every 6 months. · Try to get at least 150 minutes of exercise per week or 10,000 steps per day on a pedometer . · Order or download the FREE \"Exercise & Physical Activity: Your Everyday Guide\" from [employer]. Call 6-675.712.2057 or search [employer] online. · You need 9976-0876 mg of calcium and 7441-8864 IU of vitamin D per day. It is possible to meet your calcium requirement with diet alone, but a vitamin D supplement is usually necessary to meet this goal.  · When exposed to the sun, use a sunscreen that protects against both UVA and UVB radiation with an SPF of 30 or greater. Reapply every 2 to 3 hours or after sweating, drying off with a towel, or swimming. · Always wear a seat belt when traveling in a car. Always wear a helmet when riding a bicycle or motorcycle.

## 2021-11-02 ENCOUNTER — HOSPITAL ENCOUNTER (OUTPATIENT)
Age: 81
Discharge: HOME | End: 2021-11-02
Payer: COMMERCIAL

## 2021-11-02 ENCOUNTER — OFFICE VISIT (OUTPATIENT)
Dept: INTERNAL MEDICINE CLINIC | Age: 81
End: 2021-11-02
Payer: COMMERCIAL

## 2021-11-02 ENCOUNTER — HOSPITAL ENCOUNTER (OUTPATIENT)
Dept: GENERAL RADIOLOGY | Age: 81
Discharge: HOME | End: 2021-11-02
Payer: COMMERCIAL

## 2021-11-02 VITALS
TEMPERATURE: 97.5 F | HEIGHT: 66 IN | OXYGEN SATURATION: 98 % | DIASTOLIC BLOOD PRESSURE: 70 MMHG | WEIGHT: 210.4 LBS | HEART RATE: 53 BPM | BODY MASS INDEX: 33.82 KG/M2 | SYSTOLIC BLOOD PRESSURE: 120 MMHG

## 2021-11-02 DIAGNOSIS — R07.89 OTHER CHEST PAIN: ICD-10-CM

## 2021-11-02 DIAGNOSIS — I10 PRIMARY HYPERTENSION: Primary | ICD-10-CM

## 2021-11-02 PROCEDURE — 1036F TOBACCO NON-USER: CPT | Performed by: INTERNAL MEDICINE

## 2021-11-02 PROCEDURE — 99213 OFFICE O/P EST LOW 20 MIN: CPT | Performed by: INTERNAL MEDICINE

## 2021-11-02 PROCEDURE — 71046 X-RAY EXAM CHEST 2 VIEWS: CPT

## 2021-11-02 PROCEDURE — 4040F PNEUMOC VAC/ADMIN/RCVD: CPT | Performed by: INTERNAL MEDICINE

## 2021-11-02 PROCEDURE — G8417 CALC BMI ABV UP PARAM F/U: HCPCS | Performed by: INTERNAL MEDICINE

## 2021-11-02 PROCEDURE — 1123F ACP DISCUSS/DSCN MKR DOCD: CPT | Performed by: INTERNAL MEDICINE

## 2021-11-02 PROCEDURE — G8484 FLU IMMUNIZE NO ADMIN: HCPCS | Performed by: INTERNAL MEDICINE

## 2021-11-02 PROCEDURE — G8427 DOCREV CUR MEDS BY ELIG CLIN: HCPCS | Performed by: INTERNAL MEDICINE

## 2021-11-02 RX ORDER — HYDROCODONE BITARTRATE AND ACETAMINOPHEN 5; 325 MG/1; MG/1
1 TABLET ORAL EVERY 6 HOURS PRN
Qty: 30 TABLET | Refills: 0 | Status: SHIPPED | OUTPATIENT
Start: 2021-11-02 | End: 2022-02-23 | Stop reason: SDUPTHER

## 2021-11-02 RX ORDER — AMLODIPINE BESYLATE 2.5 MG/1
2.5 TABLET ORAL DAILY
Qty: 90 TABLET | Refills: 1 | Status: SHIPPED | OUTPATIENT
Start: 2021-11-02 | End: 2022-02-23 | Stop reason: SDUPTHER

## 2021-11-02 ASSESSMENT — PATIENT HEALTH QUESTIONNAIRE - PHQ9
SUM OF ALL RESPONSES TO PHQ9 QUESTIONS 1 & 2: 0
SUM OF ALL RESPONSES TO PHQ QUESTIONS 1-9: 0
1. LITTLE INTEREST OR PLEASURE IN DOING THINGS: 0
SUM OF ALL RESPONSES TO PHQ QUESTIONS 1-9: 0
SUM OF ALL RESPONSES TO PHQ QUESTIONS 1-9: 0
2. FEELING DOWN, DEPRESSED OR HOPELESS: 0

## 2021-11-02 ASSESSMENT — ENCOUNTER SYMPTOMS
EYE REDNESS: 0
ABDOMINAL PAIN: 0
SHORTNESS OF BREATH: 0
CHEST TIGHTNESS: 0
NAUSEA: 0
BACK PAIN: 0

## 2021-11-02 NOTE — PROGRESS NOTES
Subjective:      Patient ID: Santos Michel is a [de-identified] y.o. male    Chief Complaint   Patient presents with   [address]     follow up     Chest Pain       Chest Pain   This is a new problem. The current episode started 1 to 4 weeks ago. The pain is present in the lateral region. The pain is at a severity of 4/10. The pain is moderate. The quality of the pain is described as sharp. The pain does not radiate. Pertinent negatives include no abdominal pain, back pain, dizziness, fever, headaches, nausea, palpitations or shortness of breath. Hypertension  This is a chronic problem. The current episode started more than 1 year ago. The problem is controlled. Associated symptoms include chest pain. Pertinent negatives include no headaches, neck pain, palpitations or shortness of breath. (Recent increased Cr. He did have a cystoscopy for BPH recently.) Past treatments include ACE inhibitors and diuretics. The current treatment provides moderate improvement. Compliance problems include exercise. Hypertensive end-organ damage includes CAD/MI. Current Outpatient Medications on File Prior to Visit   Medication Sig Dispense Refill    hydroCHLOROthiazide (HYDRODIURIL) 25 MG tablet TAKE ONE TABLET BY MOUTH DAILY 90 tablet 1    atorvastatin (LIPITOR) 40 MG tablet Take 1 tablet by mouth daily 90 tablet 1    colchicine (COLCRYS) 0.6 MG tablet TAKE ONE TABLET BY MOUTH DAILY 90 tablet 1    allopurinol (ZYLOPRIM) 300 MG tablet TAKE ONE TABLET BY MOUTH DAILY 90 tablet 3    isosorbide mononitrate (IMDUR) 60 MG extended release tablet Take 2 tablets by mouth daily 180 tablet 3    vitamin D (CHOLECALCIFEROL) 1000 UNIT TABS tablet Take 2,000 Int'l Units by mouth daily.  aspirin 325 MG tablet Take 325 mg by mouth daily.  finasteride (PROSCAR) 5 MG tablet Take 5 mg by mouth daily.  fish oil-omega-3 fatty acids 1000 MG capsule Take 2 g by mouth daily.       nitroGLYCERIN (NITROSTAT) 0.4 MG SL tablet Place 1 tablet under the tongue every 5 minutes as needed for Chest pain (Patient not taking: Reported on 11/2/2021) 25 tablet 3    Polyethylene Glycol 3350 (MIRALAX PO) Take by mouth (Patient not taking: Reported on 11/2/2021)      amLODIPine (NORVASC) 5 MG tablet TAKE THREE TABLETS BY MOUTH DAILY 90 tablet 1    clotrimazole-betamethasone (LOTRISONE) cream Apply  topically 2 times daily.  naftifine (NAFTIN) 1 % cream Apply  topically daily. No current facility-administered medications on file prior to visit. Allergies   Allergen Reactions    Sulfa Antibiotics        Review of Systems   Constitutional: Negative for fatigue, fever and unexpected weight change. HENT: Negative for hearing loss. Eyes: Negative for redness and visual disturbance. Respiratory: Negative for chest tightness and shortness of breath. Cardiovascular: Positive for chest pain. Negative for palpitations. Gastrointestinal: Negative for abdominal pain and nausea. Endocrine: Negative for polydipsia and polyuria. Genitourinary: Negative for dysuria and hematuria. Musculoskeletal: Negative for arthralgias, back pain and neck pain. Skin: Negative for rash and wound. Neurological: Negative for dizziness and headaches. Hematological: Negative for adenopathy. Does not bruise/bleed easily. Psychiatric/Behavioral: Negative for agitation. The patient is not nervous/anxious. Objective:   Physical Exam  Constitutional:       Appearance: Normal appearance. He is well-developed. Cardiovascular:      Rate and Rhythm: Normal rate and regular rhythm. Heart sounds: Normal heart sounds. No murmur heard. Pulmonary:      Effort: Pulmonary effort is normal.      Breath sounds: Normal breath sounds. No rales. Skin:     Findings: No rash. Neurological:      Mental Status: He is alert and oriented to person, place, and time. Psychiatric:         Mood and Affect: Mood normal.         Assessment and plan       1. Primary hypertension  Increase creatinine to 1.7. Discontinue lisinopril, start on amlodipine. Recheck creatinine in 3 to 4 weeks. - Basic Metabolic Panel; Future  - amLODIPine (NORVASC) 2.5 MG tablet; Take 1 tablet by mouth daily  Dispense: 90 tablet; Refill: 1    2. Other chest pain  Sharp chest pains over the last 1 month. Check chest x-ray. - XR CHEST (2 VW); Future  Orders Placed This Encounter   Medications    amLODIPine (NORVASC) 2.5 MG tablet     Sig: Take 1 tablet by mouth daily     Dispense:  90 tablet     Refill:  1    HYDROcodone-acetaminophen (NORCO) 5-325 MG per tablet     Sig: Take 1 tablet by mouth every 6 hours as needed for Pain for up to 30 days. Dispense:  30 tablet     Refill:  0     Reduce doses taken as pain becomes manageable     Controlled Substance Monitoring:    Acute and Chronic Pain Monitoring:   RX Monitoring 11/2/2021   Attestation -   Acute Pain Prescriptions Severe pain not adequately treated with lower dose. Periodic Controlled Substance Monitoring No signs of potential drug abuse or diversion identified.

## 2021-12-02 DIAGNOSIS — I10 PRIMARY HYPERTENSION: ICD-10-CM

## 2021-12-02 LAB
ANION GAP SERPL CALCULATED.3IONS-SCNC: 14 MMOL/L (ref 3–16)
BUN BLDV-MCNC: 22 MG/DL (ref 7–20)
CALCIUM SERPL-MCNC: 9.7 MG/DL (ref 8.3–10.6)
CHLORIDE BLD-SCNC: 104 MMOL/L (ref 99–110)
CO2: 25 MMOL/L (ref 21–32)
CREAT SERPL-MCNC: 1.3 MG/DL (ref 0.8–1.3)
GFR AFRICAN AMERICAN: >60
GFR NON-AFRICAN AMERICAN: 53
GLUCOSE BLD-MCNC: 103 MG/DL (ref 70–99)
POTASSIUM SERPL-SCNC: 4.6 MMOL/L (ref 3.5–5.1)
SODIUM BLD-SCNC: 143 MMOL/L (ref 136–145)

## 2021-12-07 ENCOUNTER — OFFICE VISIT (OUTPATIENT)
Dept: INTERNAL MEDICINE CLINIC | Age: 81
End: 2021-12-07
Payer: COMMERCIAL

## 2021-12-07 VITALS
DIASTOLIC BLOOD PRESSURE: 60 MMHG | BODY MASS INDEX: 32.8 KG/M2 | TEMPERATURE: 98.4 F | WEIGHT: 209 LBS | OXYGEN SATURATION: 98 % | HEART RATE: 69 BPM | HEIGHT: 67 IN | SYSTOLIC BLOOD PRESSURE: 130 MMHG

## 2021-12-07 DIAGNOSIS — L30.9 DERMATITIS: ICD-10-CM

## 2021-12-07 DIAGNOSIS — I10 PRIMARY HYPERTENSION: Primary | ICD-10-CM

## 2021-12-07 PROCEDURE — 1036F TOBACCO NON-USER: CPT | Performed by: INTERNAL MEDICINE

## 2021-12-07 PROCEDURE — G8417 CALC BMI ABV UP PARAM F/U: HCPCS | Performed by: INTERNAL MEDICINE

## 2021-12-07 PROCEDURE — G8427 DOCREV CUR MEDS BY ELIG CLIN: HCPCS | Performed by: INTERNAL MEDICINE

## 2021-12-07 PROCEDURE — G8484 FLU IMMUNIZE NO ADMIN: HCPCS | Performed by: INTERNAL MEDICINE

## 2021-12-07 PROCEDURE — 4040F PNEUMOC VAC/ADMIN/RCVD: CPT | Performed by: INTERNAL MEDICINE

## 2021-12-07 PROCEDURE — 1123F ACP DISCUSS/DSCN MKR DOCD: CPT | Performed by: INTERNAL MEDICINE

## 2021-12-07 PROCEDURE — 99213 OFFICE O/P EST LOW 20 MIN: CPT | Performed by: INTERNAL MEDICINE

## 2021-12-07 RX ORDER — NAFTIFINE HYDROCHLORIDE 1 MG/G
CREAM TOPICAL
Qty: 60 G | Refills: 1 | Status: SHIPPED | OUTPATIENT
Start: 2021-12-07 | End: 2022-03-17 | Stop reason: CLARIF

## 2021-12-07 ASSESSMENT — PATIENT HEALTH QUESTIONNAIRE - PHQ9
SUM OF ALL RESPONSES TO PHQ QUESTIONS 1-9: 0
2. FEELING DOWN, DEPRESSED OR HOPELESS: 0
SUM OF ALL RESPONSES TO PHQ QUESTIONS 1-9: 0
SUM OF ALL RESPONSES TO PHQ9 QUESTIONS 1 & 2: 0
SUM OF ALL RESPONSES TO PHQ QUESTIONS 1-9: 0
1. LITTLE INTEREST OR PLEASURE IN DOING THINGS: 0

## 2021-12-07 ASSESSMENT — ENCOUNTER SYMPTOMS
EYE REDNESS: 0
NAUSEA: 0
CHEST TIGHTNESS: 0
CONSTIPATION: 1
ABDOMINAL PAIN: 0
SHORTNESS OF BREATH: 0
BACK PAIN: 0

## 2021-12-07 NOTE — PROGRESS NOTES
Subjective:      Patient ID: Petra dykes a [de-identified] y.o. male    Chief Complaint   Patient presents with    1 Month Follow-Up     hypertension        Hypertension  This is a chronic problem. The problem is controlled. Pertinent negatives include no chest pain, headaches, malaise/fatigue, neck pain, palpitations, peripheral edema or shortness of breath. (He had an increase in creatinine and potassium with ACE inhibitor, but this improved when the ACE was stopped and he is taking amlodipine instead. ) Past treatments include calcium channel blockers and diuretics. The current treatment provides significant improvement. Compliance problems include exercise. Current Outpatient Medications on File Prior to Visit   Medication Sig Dispense Refill    amLODIPine (NORVASC) 2.5 MG tablet Take 1 tablet by mouth daily 90 tablet 1    hydroCHLOROthiazide (HYDRODIURIL) 25 MG tablet TAKE ONE TABLET BY MOUTH DAILY 90 tablet 1    atorvastatin (LIPITOR) 40 MG tablet Take 1 tablet by mouth daily 90 tablet 1    colchicine (COLCRYS) 0.6 MG tablet TAKE ONE TABLET BY MOUTH DAILY 90 tablet 1    allopurinol (ZYLOPRIM) 300 MG tablet TAKE ONE TABLET BY MOUTH DAILY 90 tablet 3    isosorbide mononitrate (IMDUR) 60 MG extended release tablet Take 2 tablets by mouth daily 180 tablet 3    vitamin D (CHOLECALCIFEROL) 1000 UNIT TABS tablet Take 2,000 Int'l Units by mouth daily.  aspirin 325 MG tablet Take 325 mg by mouth daily.  finasteride (PROSCAR) 5 MG tablet Take 5 mg by mouth daily.  clotrimazole-betamethasone (LOTRISONE) cream Apply  topically 2 times daily.  fish oil-omega-3 fatty acids 1000 MG capsule Take 2 g by mouth daily. No current facility-administered medications on file prior to visit. Allergies   Allergen Reactions    Sulfa Antibiotics        Review of Systems   Constitutional: Negative for fatigue, fever, malaise/fatigue and unexpected weight change. HENT: Negative for hearing loss. Eyes: Negative for redness and visual disturbance. Respiratory: Negative for chest tightness and shortness of breath. Cardiovascular: Negative for chest pain and palpitations. Gastrointestinal: Positive for constipation. Negative for abdominal pain and nausea. Endocrine: Negative for polydipsia and polyuria. Genitourinary: Negative for dysuria and hematuria. Musculoskeletal: Negative for arthralgias, back pain and neck pain. Skin: Negative for rash and wound. Allergic/Immunologic: Negative for environmental allergies. Neurological: Negative for dizziness and headaches. Hematological: Negative for adenopathy. Does not bruise/bleed easily. Psychiatric/Behavioral: Negative for agitation. The patient is not nervous/anxious. Objective:   Physical Exam  Constitutional:       Appearance: Normal appearance. He is well-developed. Cardiovascular:      Rate and Rhythm: Normal rate and regular rhythm. Heart sounds: Normal heart sounds. No murmur heard. Pulmonary:      Effort: Pulmonary effort is normal.      Breath sounds: Normal breath sounds. No rales. Skin:     General: Skin is warm and dry. Findings: No rash. Neurological:      General: No focal deficit present. Mental Status: He is alert and oriented to person, place, and time. Psychiatric:         Mood and Affect: Mood normal.         Assessment and plan       1. Primary hypertension  Improved creatinine and potassium after stopping lisinopril, and switching to amlodipine along with hydrochlorothiazide. 2. Dermatitis  Refill medication. - naftifine (NAFTIN) 1 % cream; Apply  topically daily. Dispense: 60 g; Refill: 1      Follow-up in 3 to 4 months with Dr. Susu Ward.

## 2021-12-08 ENCOUNTER — IMMUNIZATION (OUTPATIENT)
Dept: FAMILY MEDICINE CLINIC | Age: 81
End: 2021-12-08
Payer: COMMERCIAL

## 2021-12-08 ENCOUNTER — TELEPHONE (OUTPATIENT)
Dept: ORTHOPEDIC SURGERY | Age: 81
End: 2021-12-08

## 2021-12-08 PROCEDURE — 91300 COVID-19, PFIZER VACCINE 30MCG/0.3ML DOSE: CPT | Performed by: FAMILY MEDICINE

## 2021-12-08 PROCEDURE — 0004A COVID-19, PFIZER VACCINE 30MCG/0.3ML DOSE: CPT | Performed by: FAMILY MEDICINE

## 2021-12-08 NOTE — TELEPHONE ENCOUNTER
PA submitted via CM for Naftifine HCl 2% cream.  Key: [id number] - PA Case ID: [id number] - Rx #: [id number]    STATUS: PENDING

## 2021-12-09 NOTE — TELEPHONE ENCOUNTER
Received APPROVAL for Naftifine HCl 2% cream from 09/09/2021 through 12/08/2022. Approval letter attached. Please advise patient. Thank you!

## 2022-01-06 DIAGNOSIS — J02.9 SORE THROAT: Primary | ICD-10-CM

## 2022-01-07 ENCOUNTER — NURSE ONLY (OUTPATIENT)
Dept: INTERNAL MEDICINE CLINIC | Age: 82
End: 2022-01-07

## 2022-01-07 DIAGNOSIS — J02.9 SORE THROAT: ICD-10-CM

## 2022-01-08 LAB — SARS-COV-2: NOT DETECTED

## 2022-02-11 RX ORDER — ISOSORBIDE MONONITRATE 60 MG/1
120 TABLET, EXTENDED RELEASE ORAL DAILY
Qty: 180 TABLET | Refills: 3 | Status: SHIPPED | OUTPATIENT
Start: 2022-02-11 | End: 2022-03-17 | Stop reason: CLARIF

## 2022-02-23 ENCOUNTER — OFFICE VISIT (OUTPATIENT)
Dept: INTERNAL MEDICINE CLINIC | Age: 82
End: 2022-02-23
Payer: COMMERCIAL

## 2022-02-23 VITALS
SYSTOLIC BLOOD PRESSURE: 124 MMHG | OXYGEN SATURATION: 97 % | BODY MASS INDEX: 32.93 KG/M2 | DIASTOLIC BLOOD PRESSURE: 70 MMHG | RESPIRATION RATE: 16 BRPM | TEMPERATURE: 97.8 F | HEIGHT: 67 IN | HEART RATE: 63 BPM | WEIGHT: 209.8 LBS

## 2022-02-23 DIAGNOSIS — F51.01 PRIMARY INSOMNIA: ICD-10-CM

## 2022-02-23 DIAGNOSIS — I25.118 ATHEROSCLEROSIS OF NATIVE CORONARY ARTERY OF NATIVE HEART WITH OTHER FORM OF ANGINA PECTORIS (HCC): ICD-10-CM

## 2022-02-23 DIAGNOSIS — R73.9 HYPERGLYCEMIA: ICD-10-CM

## 2022-02-23 DIAGNOSIS — R42 VERTIGO: ICD-10-CM

## 2022-02-23 DIAGNOSIS — M1A.9XX0 CHRONIC GOUT WITHOUT TOPHUS, UNSPECIFIED CAUSE, UNSPECIFIED SITE: ICD-10-CM

## 2022-02-23 DIAGNOSIS — I10 PRIMARY HYPERTENSION: Primary | ICD-10-CM

## 2022-02-23 DIAGNOSIS — R07.89 OTHER CHEST PAIN: ICD-10-CM

## 2022-02-23 DIAGNOSIS — K59.09 OTHER CONSTIPATION: ICD-10-CM

## 2022-02-23 DIAGNOSIS — N40.1 BENIGN NON-NODULAR PROSTATIC HYPERPLASIA WITH LOWER URINARY TRACT SYMPTOMS: ICD-10-CM

## 2022-02-23 DIAGNOSIS — E78.2 MIXED HYPERLIPIDEMIA: ICD-10-CM

## 2022-02-23 PROCEDURE — 1036F TOBACCO NON-USER: CPT | Performed by: INTERNAL MEDICINE

## 2022-02-23 PROCEDURE — 99214 OFFICE O/P EST MOD 30 MIN: CPT | Performed by: INTERNAL MEDICINE

## 2022-02-23 PROCEDURE — G8417 CALC BMI ABV UP PARAM F/U: HCPCS | Performed by: INTERNAL MEDICINE

## 2022-02-23 PROCEDURE — G8427 DOCREV CUR MEDS BY ELIG CLIN: HCPCS | Performed by: INTERNAL MEDICINE

## 2022-02-23 PROCEDURE — G8484 FLU IMMUNIZE NO ADMIN: HCPCS | Performed by: INTERNAL MEDICINE

## 2022-02-23 PROCEDURE — 1123F ACP DISCUSS/DSCN MKR DOCD: CPT | Performed by: INTERNAL MEDICINE

## 2022-02-23 PROCEDURE — 4040F PNEUMOC VAC/ADMIN/RCVD: CPT | Performed by: INTERNAL MEDICINE

## 2022-02-23 RX ORDER — HYDROCODONE BITARTRATE AND ACETAMINOPHEN 5; 325 MG/1; MG/1
1 TABLET ORAL EVERY 6 HOURS PRN
Qty: 30 TABLET | Refills: 0 | Status: SHIPPED | OUTPATIENT
Start: 2022-02-23 | End: 2022-03-25

## 2022-02-23 RX ORDER — ALLOPURINOL 300 MG/1
TABLET ORAL
Qty: 90 TABLET | Refills: 1 | Status: SHIPPED | OUTPATIENT
Start: 2022-02-23

## 2022-02-23 RX ORDER — ATORVASTATIN CALCIUM 40 MG/1
40 TABLET, FILM COATED ORAL DAILY
Qty: 90 TABLET | Refills: 1 | Status: SHIPPED | OUTPATIENT
Start: 2022-02-23

## 2022-02-23 RX ORDER — ZOLPIDEM TARTRATE 10 MG/1
10 TABLET ORAL NIGHTLY PRN
Qty: 90 TABLET | Refills: 0 | Status: SHIPPED | OUTPATIENT
Start: 2022-02-23 | End: 2022-03-25

## 2022-02-23 RX ORDER — AMLODIPINE BESYLATE 2.5 MG/1
2.5 TABLET ORAL DAILY
Qty: 90 TABLET | Refills: 1 | Status: SHIPPED | OUTPATIENT
Start: 2022-02-23 | End: 2022-05-16 | Stop reason: SDUPTHER

## 2022-02-23 ASSESSMENT — PATIENT HEALTH QUESTIONNAIRE - PHQ9
2. FEELING DOWN, DEPRESSED OR HOPELESS: 0
SUM OF ALL RESPONSES TO PHQ QUESTIONS 1-9: 0
SUM OF ALL RESPONSES TO PHQ QUESTIONS 1-9: 0
1. LITTLE INTEREST OR PLEASURE IN DOING THINGS: 0
SUM OF ALL RESPONSES TO PHQ QUESTIONS 1-9: 0
SUM OF ALL RESPONSES TO PHQ QUESTIONS 1-9: 0
SUM OF ALL RESPONSES TO PHQ9 QUESTIONS 1 & 2: 0

## 2022-02-23 ASSESSMENT — ENCOUNTER SYMPTOMS
ALLERGIC/IMMUNOLOGIC NEGATIVE: 1
VOMITING: 0
BACK PAIN: 1
CONSTIPATION: 1
EYES NEGATIVE: 1
CHEST TIGHTNESS: 1

## 2022-02-23 NOTE — PROGRESS NOTES
Subjective:      Patient ID: Marichuy Cao is a [de-identified] y.o. male. HPI  Here today for follow up of chronic problems as per HPI and as problems listed under assessment and plan,no new c/o feels good     Hypertension  This is a chronic problem. The current episode started more than 1 year ago. The problem is unchanged. The problem is controlled. Associated symptoms include anxiety and palpitations. Chest pain: occ . sx  Risk factors for coronary artery disease include dyslipidemia, obesity and male gender. Past treatments include beta blockers, direct vasodilators, lifestyle changes and diuretics. The current treatment provides significant improvement. There are no compliance problems. Hypertensive end-organ damage includes CAD/MI. Hyperlipidemia  This is a chronic problem. The current episode started more than 1 year ago. The problem is uncontrolled (? past compliance with diet and meds ). Chest pain: occ . sx  Current antihyperlipidemic treatment includes diet change, exercise and statins. The current treatment provides moderate improvement of lipids. Compliance problems include adherence to exercise, psychosocial issues and adherence to diet. Risk factors for coronary artery disease include dyslipidemia, hypertension, male sex and obesity (known CAD ). Allergies   Allergen Reactions    Sulfa Antibiotics        Current Outpatient Medications   Medication Sig Dispense Refill    atorvastatin (LIPITOR) 40 MG tablet Take 1 tablet by mouth daily 90 tablet 1    allopurinol (ZYLOPRIM) 300 MG tablet TAKE ONE TABLET BY MOUTH DAILY 90 tablet 1    amLODIPine (NORVASC) 2.5 MG tablet Take 1 tablet by mouth daily 90 tablet 1    HYDROcodone-acetaminophen (NORCO) 5-325 MG per tablet Take 1 tablet by mouth every 6 hours as needed for Pain for up to 30 days. 30 tablet 0    zolpidem (AMBIEN) 10 MG tablet Take 1 tablet by mouth nightly as needed for Sleep for up to 30 days.  90 tablet 0    isosorbide mononitrate (IMDUR) 60 MG extended release tablet Take 2 tablets by mouth daily 180 tablet 3    naftifine (NAFTIN) 1 % cream Apply  topically daily. 60 g 1    hydroCHLOROthiazide (HYDRODIURIL) 25 MG tablet TAKE ONE TABLET BY MOUTH DAILY 90 tablet 1    colchicine (COLCRYS) 0.6 MG tablet TAKE ONE TABLET BY MOUTH DAILY 90 tablet 1    vitamin D (CHOLECALCIFEROL) 1000 UNIT TABS tablet Take 2,000 Int'l Units by mouth daily.  aspirin 325 MG tablet Take 325 mg by mouth daily.  finasteride (PROSCAR) 5 MG tablet Take 5 mg by mouth daily.  clotrimazole-betamethasone (LOTRISONE) cream Apply  topically 2 times daily.  fish oil-omega-3 fatty acids 1000 MG capsule Take 2 g by mouth daily. No current facility-administered medications for this visit.        Past Medical History:   Diagnosis Date    Contact dermatitis and other eczema, due to unspecified cause     Coronary atherosclerosis of unspecified type of vessel, native or graft     Cardio- Dr. Vernia Krabbe Gout, unspecified     Hypertension     Hypertrophy of prostate without urinary obstruction and other lower urinary tract symptoms (LUTS)     Other and unspecified hyperlipidemia     Other chest pain     Pain in limb     Foot Pain    Peripheral vascular complications     Screening PSA (prostate specific antigen) 2/19/2009    1.5    Stool blood 7/21/2008    Guiac Negative       Family History   Problem Relation Age of Onset    Coronary Art Dis Father        Past Surgical History:   Procedure Laterality Date    CARDIAC SURGERY      4 vessel cabg Dr. Carley Cash y    COLONOSCOPY  2/16/07    Dr. Becky Potts 2/2012    COLONOSCOPY  5.23.13    Chapo Frazier MD polyp repeat 2018    COLONOSCOPY  06/18/2018    Dr. Yoseph Barcenas polyp removed no repeat necessary   [address]  2003    Dr. Guille Johnson History     Socioeconomic History    Marital status:      Spouse name: Not on file    Number of children: Not on file    Years of education: Not on file    Highest education level: Not on file   Occupational History    Not on file   Tobacco Use    Smoking status: Former Smoker     Packs/day: 1.50     Years: 50.00     Pack years: 75.00     Types: Cigarettes     Quit date: 2003     Years since quittin.1    Smokeless tobacco: Former User   Substance and Sexual Activity    Alcohol use: Yes     Comment: Seldom    Drug use: No    Sexual activity: Yes     Partners: Female   Other Topics Concern    Not on file   Social History Narrative    Not on file     Social Determinants of Health     Financial Resource Strain: Low Risk     Difficulty of Paying Living Expenses: Not hard at all   Food Insecurity: No Food Insecurity    Worried About [employer] [employer] in the Last Year: Never true    [address] in the Last Year: Never true   Transportation Needs:     Lack of Transportation (Medical): Not on file    Lack of Transportation (Non-Medical): Not on file   Physical Activity:     Days of Exercise per Week: Not on file    Minutes of Exercise per Session: Not on file   Stress:     Feeling of Stress : Not on file   Social Connections:     Frequency of Communication with Friends and Family: Not on file    Frequency of Social Gatherings with Friends and Family: Not on file    Attends Sikh Services: Not on file    Active Member of [address] [place] [address] or Organizations: Not on file    Attends Club or Organization Meetings: Not on file    Marital Status: Not on file   Intimate Partner Violence:     Fear of Current or Ex-Partner: Not on file    Emotionally Abused: Not on file    Physically Abused: Not on file    Sexually Abused: Not on file   Housing Stability:     Unable to Pay for Housing in the Last Year: Not on file    Number of [place] in the Last Year: Not on file    Unstable Housing in the Last Year: Not on file       Review of Systems  Review of Systems   HENT: Negative. Eyes: Negative.     Respiratory: Positive for chest tightness (at nite when he lays down across whole chest ). Cardiovascular: Positive for palpitations. Negative for leg swelling. Chest pain: occ .sx    Gastrointestinal: Positive for constipation (improved ). Negative for vomiting. Constipation improved    Endocrine: Negative. Genitourinary: Positive for frequency. BPH as per Dr Traci May    Musculoskeletal: Positive for arthralgias and back pain. Skin: Negative. Mult moles  ? ? Changes will refer to derm    Allergic/Immunologic: Negative. Neurological: Positive for dizziness and light-headedness. Negative for tremors, weakness and numbness. Hematological: Negative. Psychiatric/Behavioral: Positive for sleep disturbance. The patient is nervous/anxious. Objective:   Physical Exam:  Physical Exam  Constitutional:       Appearance: He is well-developed. HENT:      Head: Normocephalic and atraumatic. Right Ear: External ear normal.      Left Ear: External ear normal.   Eyes:      Conjunctiva/sclera: Conjunctivae normal.      Pupils: Pupils are equal, round, and reactive to light. Comments: L Cataract as per Dr Kimberley Amaya    Neck:      Thyroid: No thyromegaly. Trachea: No tracheal deviation. Cardiovascular:      Rate and Rhythm: Normal rate and regular rhythm. Pulses: Normal pulses. Heart sounds: Normal heart sounds. No murmur heard. Pulmonary:      Effort: Pulmonary effort is normal.      Breath sounds: Normal breath sounds. Genitourinary:     Comments:    Musculoskeletal:         General: Normal range of motion. Cervical back: Normal range of motion and neck supple. Skin:     General: Skin is warm and dry. Comments: Mult moles over body ? Changes in shape and color    Neurological:      General: No focal deficit present. Mental Status: He is alert. Mental status is at baseline. He is disoriented. Cranial Nerves: No cranial nerve deficit.       Deep Tendon Reflexes: Reflexes are normal and symmetric. Reflexes normal.   Psychiatric:         Mood and Affect: Mood normal.         Behavior: Behavior normal.         Thought Content:  Thought content normal.         /70 (Site: Right Upper Arm, Position: Sitting, Cuff Size: Medium Adult)   Pulse 63   Temp 97.8 °F (36.6 °C) (Temporal)   Resp 16   Ht 5' 7\" (1.702 m)   Wt 209 lb 12.8 oz (95.2 kg)   SpO2 97%   BMI 32.86 kg/m²       Assessment & Plan:         Vertigo   Occ symptoms prn meds     Other constipation   Well-controlled, lifestyle modifications recommended diet controled occ OTC meds     Hypertension   Well-controlled, continue current medications and lifestyle modifications recommended Repeat labs in 6 months  With CPE     Hyperlipidemia   At goal, continue current medications and lifestyle modifications recommended Repeat labs in 6 months  With CPE     Hyperglycemia   Well-controlled, lifestyle modifications recommended diet controled     Gout    No recent symptoms cont current Tx     Coronary atherosclerosis   Monitored by specialist- no acute findings meriting change in the plan no recent symptoms last labs ok Repeat labs in 6 months  With CPE     Benign non-nodular prostatic hyperplasia with lower urinary tract symptoms   Monitored by specialist- no acute findings meriting change in the plan cont with Dr Simeon Nicole labs pending     Atherosclerosis of native coronary artery of native heart with other form of angina pectoris ([address]   Monitored by specialist- no acute findings meriting change in the plan    Chest pain   on and off c/o occ at nite with relief of.sx with  Prn  meds

## 2022-02-23 NOTE — ASSESSMENT & PLAN NOTE
Monitored by specialist- no acute findings meriting change in the plan no recent symptoms last labs ok Repeat labs in 6 months  With CPE

## 2022-02-23 NOTE — ASSESSMENT & PLAN NOTE
At goal, continue current medications and lifestyle modifications recommended Repeat labs in 6 months  With CPE

## 2022-02-23 NOTE — ASSESSMENT & PLAN NOTE
Monitored by specialist- no acute findings meriting change in the plan cont with Dr Mendosa Salvage labs pending

## 2022-02-24 DIAGNOSIS — R07.89 OTHER CHEST PAIN: ICD-10-CM

## 2022-02-24 DIAGNOSIS — I10 PRIMARY HYPERTENSION: ICD-10-CM

## 2022-02-24 DIAGNOSIS — K59.09 OTHER CONSTIPATION: ICD-10-CM

## 2022-02-24 DIAGNOSIS — I25.118 ATHEROSCLEROSIS OF NATIVE CORONARY ARTERY OF NATIVE HEART WITH OTHER FORM OF ANGINA PECTORIS (HCC): ICD-10-CM

## 2022-02-24 DIAGNOSIS — E78.2 MIXED HYPERLIPIDEMIA: ICD-10-CM

## 2022-02-24 DIAGNOSIS — R73.9 HYPERGLYCEMIA: ICD-10-CM

## 2022-02-24 DIAGNOSIS — N40.1 BENIGN NON-NODULAR PROSTATIC HYPERPLASIA WITH LOWER URINARY TRACT SYMPTOMS: ICD-10-CM

## 2022-02-24 DIAGNOSIS — F51.01 PRIMARY INSOMNIA: ICD-10-CM

## 2022-02-24 DIAGNOSIS — M1A.9XX0 CHRONIC GOUT WITHOUT TOPHUS, UNSPECIFIED CAUSE, UNSPECIFIED SITE: ICD-10-CM

## 2022-02-24 DIAGNOSIS — R42 VERTIGO: ICD-10-CM

## 2022-02-24 LAB
A/G RATIO: 2.2 (ref 1.1–2.2)
ALBUMIN SERPL-MCNC: 4.6 G/DL (ref 3.4–5)
ALP BLD-CCNC: 103 U/L (ref 40–129)
ALT SERPL-CCNC: 30 U/L (ref 10–40)
ANION GAP SERPL CALCULATED.3IONS-SCNC: 13 MMOL/L (ref 3–16)
AST SERPL-CCNC: 27 U/L (ref 15–37)
BASOPHILS ABSOLUTE: 0 K/UL (ref 0–0.2)
BASOPHILS RELATIVE PERCENT: 0.2 %
BILIRUB SERPL-MCNC: 0.9 MG/DL (ref 0–1)
BUN BLDV-MCNC: 19 MG/DL (ref 7–20)
CALCIUM SERPL-MCNC: 9.7 MG/DL (ref 8.3–10.6)
CHLORIDE BLD-SCNC: 101 MMOL/L (ref 99–110)
CHOLESTEROL, TOTAL: 124 MG/DL (ref 0–199)
CO2: 24 MMOL/L (ref 21–32)
CREAT SERPL-MCNC: 1.3 MG/DL (ref 0.8–1.3)
EOSINOPHILS ABSOLUTE: 0.2 K/UL (ref 0–0.6)
EOSINOPHILS RELATIVE PERCENT: 3.6 %
GFR AFRICAN AMERICAN: >60
GFR NON-AFRICAN AMERICAN: 53
GLUCOSE BLD-MCNC: 97 MG/DL (ref 70–99)
HCT VFR BLD CALC: 40.4 % (ref 40.5–52.5)
HDLC SERPL-MCNC: 45 MG/DL (ref 40–60)
HEMOGLOBIN: 13 G/DL (ref 13.5–17.5)
LDL CHOLESTEROL CALCULATED: 65 MG/DL
LYMPHOCYTES ABSOLUTE: 1.1 K/UL (ref 1–5.1)
LYMPHOCYTES RELATIVE PERCENT: 16.6 %
MCH RBC QN AUTO: 26.7 PG (ref 26–34)
MCHC RBC AUTO-ENTMCNC: 32.3 G/DL (ref 31–36)
MCV RBC AUTO: 82.7 FL (ref 80–100)
MONOCYTES ABSOLUTE: 0.7 K/UL (ref 0–1.3)
MONOCYTES RELATIVE PERCENT: 11.4 %
NEUTROPHILS ABSOLUTE: 4.3 K/UL (ref 1.7–7.7)
NEUTROPHILS RELATIVE PERCENT: 68.2 %
PDW BLD-RTO: 16.8 % (ref 12.4–15.4)
PLATELET # BLD: 192 K/UL (ref 135–450)
PMV BLD AUTO: 8.2 FL (ref 5–10.5)
POTASSIUM SERPL-SCNC: 4.5 MMOL/L (ref 3.5–5.1)
PROSTATE SPECIFIC ANTIGEN: 1.72 NG/ML (ref 0–4)
RBC # BLD: 4.88 M/UL (ref 4.2–5.9)
SODIUM BLD-SCNC: 138 MMOL/L (ref 136–145)
TOTAL PROTEIN: 6.7 G/DL (ref 6.4–8.2)
TRIGL SERPL-MCNC: 69 MG/DL (ref 0–150)
TSH REFLEX: 2.62 UIU/ML (ref 0.27–4.2)
URIC ACID, SERUM: 7.4 MG/DL (ref 3.5–7.2)
VLDLC SERPL CALC-MCNC: 14 MG/DL
WBC # BLD: 6.4 K/UL (ref 4–11)

## 2022-02-25 LAB
ESTIMATED AVERAGE GLUCOSE: 128.4 MG/DL
HBA1C MFR BLD: 6.1 %

## 2022-03-17 ENCOUNTER — APPOINTMENT (OUTPATIENT)
Dept: GENERAL RADIOLOGY | Age: 82
DRG: 291 | End: 2022-03-17
Payer: COMMERCIAL

## 2022-03-17 ENCOUNTER — HOSPITAL ENCOUNTER (INPATIENT)
Age: 82
LOS: 2 days | Discharge: HOME HEALTH | DRG: 291 | End: 2022-03-19
Attending: EMERGENCY MEDICINE | Admitting: HOSPITALIST
Payer: COMMERCIAL

## 2022-03-17 DIAGNOSIS — I50.9 ACUTE CONGESTIVE HEART FAILURE, UNSPECIFIED HEART FAILURE TYPE (HCC): ICD-10-CM

## 2022-03-17 DIAGNOSIS — J81.0 ACUTE PULMONARY EDEMA (HCC): Primary | ICD-10-CM

## 2022-03-17 DIAGNOSIS — I48.91 ATRIAL FIBRILLATION, UNSPECIFIED TYPE (HCC): ICD-10-CM

## 2022-03-17 LAB
ANION GAP SERPL CALCULATED.3IONS-SCNC: 10 MMOL/L (ref 3–16)
ANION GAP SERPL CALCULATED.3IONS-SCNC: 14 MMOL/L (ref 3–16)
BASOPHILS ABSOLUTE: 0 K/UL (ref 0–0.2)
BASOPHILS RELATIVE PERCENT: 0.4 %
BUN BLDV-MCNC: 14 MG/DL (ref 7–20)
BUN BLDV-MCNC: 15 MG/DL (ref 7–20)
CALCIUM SERPL-MCNC: 10 MG/DL (ref 8.3–10.6)
CALCIUM SERPL-MCNC: 9.6 MG/DL (ref 8.3–10.6)
CHLORIDE BLD-SCNC: 103 MMOL/L (ref 99–110)
CHLORIDE BLD-SCNC: 107 MMOL/L (ref 99–110)
CO2: 23 MMOL/L (ref 21–32)
CO2: 24 MMOL/L (ref 21–32)
CREAT SERPL-MCNC: 1.1 MG/DL (ref 0.8–1.3)
CREAT SERPL-MCNC: 1.2 MG/DL (ref 0.8–1.3)
EKG ATRIAL RATE: 62 BPM
EKG DIAGNOSIS: NORMAL
EKG Q-T INTERVAL: 402 MS
EKG QRS DURATION: 86 MS
EKG QTC CALCULATION (BAZETT): 446 MS
EKG R AXIS: 34 DEGREES
EKG T AXIS: 18 DEGREES
EKG VENTRICULAR RATE: 74 BPM
EOSINOPHILS ABSOLUTE: 0.1 K/UL (ref 0–0.6)
EOSINOPHILS RELATIVE PERCENT: 0.9 %
GFR AFRICAN AMERICAN: >60
GFR AFRICAN AMERICAN: >60
GFR NON-AFRICAN AMERICAN: 58
GFR NON-AFRICAN AMERICAN: >60
GLUCOSE BLD-MCNC: 115 MG/DL (ref 70–99)
GLUCOSE BLD-MCNC: 97 MG/DL (ref 70–99)
HCT VFR BLD CALC: 39.3 % (ref 40.5–52.5)
HEMOGLOBIN: 12.5 G/DL (ref 13.5–17.5)
INFLUENZA A: NOT DETECTED
INFLUENZA B: NOT DETECTED
LYMPHOCYTES ABSOLUTE: 0.7 K/UL (ref 1–5.1)
LYMPHOCYTES RELATIVE PERCENT: 7.8 %
MAGNESIUM: 1.9 MG/DL (ref 1.8–2.4)
MAGNESIUM: 2 MG/DL (ref 1.8–2.4)
MCH RBC QN AUTO: 26.1 PG (ref 26–34)
MCHC RBC AUTO-ENTMCNC: 31.9 G/DL (ref 31–36)
MCV RBC AUTO: 81.8 FL (ref 80–100)
MONOCYTES ABSOLUTE: 0.9 K/UL (ref 0–1.3)
MONOCYTES RELATIVE PERCENT: 9.4 %
NEUTROPHILS ABSOLUTE: 7.6 K/UL (ref 1.7–7.7)
NEUTROPHILS RELATIVE PERCENT: 81.5 %
PDW BLD-RTO: 17.4 % (ref 12.4–15.4)
PHOSPHORUS: 2.7 MG/DL (ref 2.5–4.9)
PLATELET # BLD: 177 K/UL (ref 135–450)
PMV BLD AUTO: 8.8 FL (ref 5–10.5)
POTASSIUM SERPL-SCNC: 4.4 MMOL/L (ref 3.5–5.1)
POTASSIUM SERPL-SCNC: 4.6 MMOL/L (ref 3.5–5.1)
PRO-BNP: 1672 PG/ML (ref 0–449)
RBC # BLD: 4.8 M/UL (ref 4.2–5.9)
SARS-COV-2 RNA, RT PCR: NOT DETECTED
SODIUM BLD-SCNC: 140 MMOL/L (ref 136–145)
SODIUM BLD-SCNC: 141 MMOL/L (ref 136–145)
TROPONIN: <0.01 NG/ML
WBC # BLD: 9.3 K/UL (ref 4–11)

## 2022-03-17 PROCEDURE — 71045 X-RAY EXAM CHEST 1 VIEW: CPT

## 2022-03-17 PROCEDURE — 99223 1ST HOSP IP/OBS HIGH 75: CPT | Performed by: INTERNAL MEDICINE

## 2022-03-17 PROCEDURE — 96375 TX/PRO/DX INJ NEW DRUG ADDON: CPT

## 2022-03-17 PROCEDURE — 84100 ASSAY OF PHOSPHORUS: CPT

## 2022-03-17 PROCEDURE — 2580000003 HC RX 258: Performed by: STUDENT IN AN ORGANIZED HEALTH CARE EDUCATION/TRAINING PROGRAM

## 2022-03-17 PROCEDURE — 93306 TTE W/DOPPLER COMPLETE: CPT

## 2022-03-17 PROCEDURE — 96372 THER/PROPH/DIAG INJ SC/IM: CPT

## 2022-03-17 PROCEDURE — 83735 ASSAY OF MAGNESIUM: CPT

## 2022-03-17 PROCEDURE — 85025 COMPLETE CBC W/AUTO DIFF WBC: CPT

## 2022-03-17 PROCEDURE — 51701 INSERT BLADDER CATHETER: CPT

## 2022-03-17 PROCEDURE — 6370000000 HC RX 637 (ALT 250 FOR IP): Performed by: EMERGENCY MEDICINE

## 2022-03-17 PROCEDURE — 87636 SARSCOV2 & INF A&B AMP PRB: CPT

## 2022-03-17 PROCEDURE — 99222 1ST HOSP IP/OBS MODERATE 55: CPT | Performed by: HOSPITALIST

## 2022-03-17 PROCEDURE — 96374 THER/PROPH/DIAG INJ IV PUSH: CPT

## 2022-03-17 PROCEDURE — 83880 ASSAY OF NATRIURETIC PEPTIDE: CPT

## 2022-03-17 PROCEDURE — G0378 HOSPITAL OBSERVATION PER HR: HCPCS

## 2022-03-17 PROCEDURE — 2060000000 HC ICU INTERMEDIATE R&B

## 2022-03-17 PROCEDURE — 6370000000 HC RX 637 (ALT 250 FOR IP): Performed by: STUDENT IN AN ORGANIZED HEALTH CARE EDUCATION/TRAINING PROGRAM

## 2022-03-17 PROCEDURE — 36415 COLL VENOUS BLD VENIPUNCTURE: CPT

## 2022-03-17 PROCEDURE — 84443 ASSAY THYROID STIM HORMONE: CPT

## 2022-03-17 PROCEDURE — 94640 AIRWAY INHALATION TREATMENT: CPT

## 2022-03-17 PROCEDURE — 99284 EMERGENCY DEPT VISIT MOD MDM: CPT

## 2022-03-17 PROCEDURE — 80048 BASIC METABOLIC PNL TOTAL CA: CPT

## 2022-03-17 PROCEDURE — 6360000002 HC RX W HCPCS: Performed by: EMERGENCY MEDICINE

## 2022-03-17 PROCEDURE — 84484 ASSAY OF TROPONIN QUANT: CPT

## 2022-03-17 PROCEDURE — 6360000002 HC RX W HCPCS: Performed by: STUDENT IN AN ORGANIZED HEALTH CARE EDUCATION/TRAINING PROGRAM

## 2022-03-17 PROCEDURE — 93005 ELECTROCARDIOGRAM TRACING: CPT | Performed by: HOSPITALIST

## 2022-03-17 RX ORDER — SODIUM CHLORIDE 0.9 % (FLUSH) 0.9 %
5-40 SYRINGE (ML) INJECTION EVERY 12 HOURS SCHEDULED
Status: DISCONTINUED | OUTPATIENT
Start: 2022-03-17 | End: 2022-03-19 | Stop reason: HOSPADM

## 2022-03-17 RX ORDER — ISOSORBIDE MONONITRATE 60 MG/1
60 TABLET, EXTENDED RELEASE ORAL 2 TIMES DAILY
Status: DISCONTINUED | OUTPATIENT
Start: 2022-03-17 | End: 2022-03-19 | Stop reason: HOSPADM

## 2022-03-17 RX ORDER — IPRATROPIUM BROMIDE AND ALBUTEROL SULFATE 2.5; .5 MG/3ML; MG/3ML
1 SOLUTION RESPIRATORY (INHALATION) ONCE
Status: DISCONTINUED | OUTPATIENT
Start: 2022-03-17 | End: 2022-03-17

## 2022-03-17 RX ORDER — HYDROCHLOROTHIAZIDE 25 MG/1
25 TABLET ORAL DAILY
Status: DISCONTINUED | OUTPATIENT
Start: 2022-03-18 | End: 2022-03-18

## 2022-03-17 RX ORDER — ATORVASTATIN CALCIUM 40 MG/1
40 TABLET, FILM COATED ORAL DAILY
Status: DISCONTINUED | OUTPATIENT
Start: 2022-03-18 | End: 2022-03-19 | Stop reason: HOSPADM

## 2022-03-17 RX ORDER — ZOLPIDEM TARTRATE 5 MG/1
5 TABLET ORAL NIGHTLY PRN
Status: DISCONTINUED | OUTPATIENT
Start: 2022-03-17 | End: 2022-03-19 | Stop reason: HOSPADM

## 2022-03-17 RX ORDER — FAMOTIDINE 20 MG/1
20 TABLET, FILM COATED ORAL 2 TIMES DAILY
Status: DISCONTINUED | OUTPATIENT
Start: 2022-03-17 | End: 2022-03-19 | Stop reason: HOSPADM

## 2022-03-17 RX ORDER — SODIUM CHLORIDE 0.9 % (FLUSH) 0.9 %
5-40 SYRINGE (ML) INJECTION PRN
Status: DISCONTINUED | OUTPATIENT
Start: 2022-03-17 | End: 2022-03-19 | Stop reason: HOSPADM

## 2022-03-17 RX ORDER — TAMSULOSIN HYDROCHLORIDE 0.4 MG/1
0.4 CAPSULE ORAL DAILY
Status: DISCONTINUED | OUTPATIENT
Start: 2022-03-18 | End: 2022-03-19 | Stop reason: HOSPADM

## 2022-03-17 RX ORDER — ALLOPURINOL 300 MG/1
1 TABLET ORAL DAILY
Status: CANCELLED | OUTPATIENT
Start: 2022-03-18

## 2022-03-17 RX ORDER — ACETAMINOPHEN 650 MG/1
650 SUPPOSITORY RECTAL EVERY 6 HOURS PRN
Status: DISCONTINUED | OUTPATIENT
Start: 2022-03-17 | End: 2022-03-19 | Stop reason: HOSPADM

## 2022-03-17 RX ORDER — FINASTERIDE 5 MG/1
5 TABLET, FILM COATED ORAL DAILY
Status: DISCONTINUED | OUTPATIENT
Start: 2022-03-18 | End: 2022-03-19 | Stop reason: HOSPADM

## 2022-03-17 RX ORDER — FUROSEMIDE 10 MG/ML
40 INJECTION INTRAMUSCULAR; INTRAVENOUS ONCE
Status: COMPLETED | OUTPATIENT
Start: 2022-03-17 | End: 2022-03-17

## 2022-03-17 RX ORDER — ONDANSETRON 2 MG/ML
4 INJECTION INTRAMUSCULAR; INTRAVENOUS EVERY 6 HOURS PRN
Status: DISCONTINUED | OUTPATIENT
Start: 2022-03-17 | End: 2022-03-19 | Stop reason: HOSPADM

## 2022-03-17 RX ORDER — ASPIRIN 325 MG
325 TABLET ORAL DAILY
Status: DISCONTINUED | OUTPATIENT
Start: 2022-03-18 | End: 2022-03-18

## 2022-03-17 RX ORDER — ACETAMINOPHEN 325 MG/1
650 TABLET ORAL EVERY 6 HOURS PRN
Status: DISCONTINUED | OUTPATIENT
Start: 2022-03-17 | End: 2022-03-19 | Stop reason: HOSPADM

## 2022-03-17 RX ORDER — ONDANSETRON 4 MG/1
4 TABLET, ORALLY DISINTEGRATING ORAL EVERY 8 HOURS PRN
Status: DISCONTINUED | OUTPATIENT
Start: 2022-03-17 | End: 2022-03-19 | Stop reason: HOSPADM

## 2022-03-17 RX ORDER — HYDROCHLOROTHIAZIDE 25 MG/1
1 TABLET ORAL DAILY
Status: CANCELLED | OUTPATIENT
Start: 2022-03-18

## 2022-03-17 RX ORDER — TAMSULOSIN HYDROCHLORIDE 0.4 MG/1
0.4 CAPSULE ORAL DAILY
COMMUNITY
End: 2022-08-19 | Stop reason: ALTCHOICE

## 2022-03-17 RX ORDER — FUROSEMIDE 10 MG/ML
40 INJECTION INTRAMUSCULAR; INTRAVENOUS 2 TIMES DAILY
Status: DISCONTINUED | OUTPATIENT
Start: 2022-03-17 | End: 2022-03-18

## 2022-03-17 RX ORDER — POLYETHYLENE GLYCOL 3350 17 G/17G
17 POWDER, FOR SOLUTION ORAL DAILY PRN
Status: DISCONTINUED | OUTPATIENT
Start: 2022-03-17 | End: 2022-03-19 | Stop reason: HOSPADM

## 2022-03-17 RX ORDER — ISOSORBIDE MONONITRATE 60 MG/1
60 TABLET, EXTENDED RELEASE ORAL 2 TIMES DAILY
COMMUNITY

## 2022-03-17 RX ORDER — ALLOPURINOL 300 MG/1
300 TABLET ORAL DAILY
Status: DISCONTINUED | OUTPATIENT
Start: 2022-03-18 | End: 2022-03-19 | Stop reason: HOSPADM

## 2022-03-17 RX ORDER — NITROGLYCERIN 0.4 MG/1
0.4 TABLET SUBLINGUAL EVERY 5 MIN PRN
Status: DISCONTINUED | OUTPATIENT
Start: 2022-03-17 | End: 2022-03-19 | Stop reason: HOSPADM

## 2022-03-17 RX ORDER — HYDROCODONE BITARTRATE AND ACETAMINOPHEN 5; 325 MG/1; MG/1
1 TABLET ORAL EVERY 6 HOURS PRN
Status: DISCONTINUED | OUTPATIENT
Start: 2022-03-17 | End: 2022-03-19 | Stop reason: HOSPADM

## 2022-03-17 RX ORDER — AMLODIPINE BESYLATE 5 MG/1
2.5 TABLET ORAL DAILY
Status: DISCONTINUED | OUTPATIENT
Start: 2022-03-18 | End: 2022-03-19 | Stop reason: HOSPADM

## 2022-03-17 RX ORDER — ZOLPIDEM TARTRATE 5 MG/1
10 TABLET ORAL NIGHTLY PRN
Status: CANCELLED | OUTPATIENT
Start: 2022-03-17

## 2022-03-17 RX ORDER — SODIUM CHLORIDE 9 MG/ML
25 INJECTION, SOLUTION INTRAVENOUS PRN
Status: DISCONTINUED | OUTPATIENT
Start: 2022-03-17 | End: 2022-03-19 | Stop reason: HOSPADM

## 2022-03-17 RX ADMIN — ISOSORBIDE MONONITRATE 60 MG: 60 TABLET, EXTENDED RELEASE ORAL at 14:50

## 2022-03-17 RX ADMIN — ENOXAPARIN SODIUM 40 MG: 40 INJECTION SUBCUTANEOUS at 14:50

## 2022-03-17 RX ADMIN — IPRATROPIUM BROMIDE AND ALBUTEROL SULFATE 1 AMPULE: .5; 3 SOLUTION RESPIRATORY (INHALATION) at 09:07

## 2022-03-17 RX ADMIN — FAMOTIDINE 20 MG: 20 TABLET ORAL at 20:43

## 2022-03-17 RX ADMIN — NITROGLYCERIN 0.4 MG: 0.4 TABLET, ORALLY DISINTEGRATING SUBLINGUAL at 08:39

## 2022-03-17 RX ADMIN — SODIUM CHLORIDE, PRESERVATIVE FREE 5 ML: 5 INJECTION INTRAVENOUS at 20:45

## 2022-03-17 RX ADMIN — FUROSEMIDE 40 MG: 10 INJECTION, SOLUTION INTRAMUSCULAR; INTRAVENOUS at 09:45

## 2022-03-17 RX ADMIN — FUROSEMIDE 40 MG: 10 INJECTION, SOLUTION INTRAMUSCULAR; INTRAVENOUS at 17:56

## 2022-03-17 RX ADMIN — ISOSORBIDE MONONITRATE 60 MG: 60 TABLET, EXTENDED RELEASE ORAL at 20:44

## 2022-03-17 RX ADMIN — NITROGLYCERIN 0.4 MG: 0.4 TABLET, ORALLY DISINTEGRATING SUBLINGUAL at 08:30

## 2022-03-17 RX ADMIN — FAMOTIDINE 20 MG: 20 TABLET ORAL at 14:50

## 2022-03-17 ASSESSMENT — PAIN DESCRIPTION - PROGRESSION: CLINICAL_PROGRESSION: GRADUALLY WORSENING

## 2022-03-17 ASSESSMENT — PAIN DESCRIPTION - FREQUENCY: FREQUENCY: CONTINUOUS

## 2022-03-17 ASSESSMENT — PAIN DESCRIPTION - PAIN TYPE: TYPE: ACUTE PAIN

## 2022-03-17 ASSESSMENT — PAIN SCALES - GENERAL
PAINLEVEL_OUTOF10: 0
PAINLEVEL_OUTOF10: 8

## 2022-03-17 ASSESSMENT — PAIN DESCRIPTION - LOCATION: LOCATION: CHEST

## 2022-03-17 ASSESSMENT — PAIN DESCRIPTION - ORIENTATION: ORIENTATION: MID

## 2022-03-17 ASSESSMENT — PAIN DESCRIPTION - DESCRIPTORS: DESCRIPTORS: TIGHTNESS

## 2022-03-17 ASSESSMENT — PAIN - FUNCTIONAL ASSESSMENT: PAIN_FUNCTIONAL_ASSESSMENT: 0-10

## 2022-03-17 ASSESSMENT — PAIN DESCRIPTION - ONSET: ONSET: GRADUAL

## 2022-03-17 NOTE — PROGRESS NOTES
4 Eyes Admission Assessment     I agree as the admission nurse that 2 RN's have performed a thorough Head to Toe Skin Assessment on the patient. ALL assessment sites listed below have been assessed on admission.        Areas assessed by both nurses:   [x]   Head, Face, and Ears   [x]   Shoulders, Back, and Chest  [x]   Arms, Elbows, and Hands   [x]   Coccyx, Sacrum, and Ischum: redness around abd and hips, red, amanda groin/romain area, redness on coccyx (blanchable) Hx of eczema   [x]   Legs, Feet, and Heels        Does the Patient have Skin Breakdown?  no        Luis M Prevention initiated:  No   Wound Care Orders initiated:  No       WOC nurse consulted for Pressure Injury (Stage 3,4, Unstageable, DTI, NWPT, and Complex wounds):  Yes      Nurse 1 eSignature: Electronically signed by Tristian Thomas RN on 3/17/22 at 6:09 PM EDT    **SHARE this note so that the co-signing nurse is able to place an eSignature**    Nurse 2 eSignature: Electronically signed by Bessie Chow RN on 3/18/22 at 1:52 AM EDT

## 2022-03-17 NOTE — PROGRESS NOTES
Clinical Pharmacy Progress Note  Medication History     Admit Date: 3/17/2022    List of of current medications patient is taking is complete. Home Medication list in EPIC updated to reflect changes noted below. Source of information: Chart Review, Conversation with Pt and Son, Dispense Reports    Patient's home pharmacy: [facility] ([phone])     Changes made to medication list:   Medications removed: (include reason, ex: therapy completed, inactive medication)   Fish oi  Medication doses adjusted:    Isosorbide (pt takes 60mg BID)   Colchicine (pt takes PRN only)  Other notes:    Reviewed medications with pt and son.  Pt was unsure if he was taking tamsulosin or hydrochlorothiazide, but per dispense reports these have been filled regularly and recently.  Note patient has an allergy listed to sulfa antibiotics but neither he nor his son could remember the reaction. Please call with any questions. Dhaval Marcial, PharmD  PGY-1 Pharmacy Resident  [id number]  3/17/2022 9:49 AM      No current facility-administered medications on file prior to encounter. Current Outpatient Medications on File Prior to Encounter   Medication Sig Dispense Refill    isosorbide mononitrate (IMDUR) 60 MG extended release tablet Take 60 mg by mouth in the morning and at bedtime      tamsulosin (FLOMAX) 0.4 MG capsule Take 0.4 mg by mouth daily      atorvastatin (LIPITOR) 40 MG tablet Take 1 tablet by mouth daily 90 tablet 1    allopurinol (ZYLOPRIM) 300 MG tablet TAKE ONE TABLET BY MOUTH DAILY 90 tablet 1    amLODIPine (NORVASC) 2.5 MG tablet Take 1 tablet by mouth daily 90 tablet 1    HYDROcodone-acetaminophen (NORCO) 5-325 MG per tablet Take 1 tablet by mouth every 6 hours as needed for Pain for up to 30 days. 30 tablet 0    zolpidem (AMBIEN) 10 MG tablet Take 1 tablet by mouth nightly as needed for Sleep for up to 30 days.  90 tablet 0    [DISCONTINUED] isosorbide mononitrate (IMDUR) 60 MG extended release tablet Take 2 tablets by mouth daily 180 tablet 3    [DISCONTINUED] naftifine (NAFTIN) 1 % cream Apply  topically daily. 60 g 1    hydroCHLOROthiazide (HYDRODIURIL) 25 MG tablet TAKE ONE TABLET BY MOUTH DAILY 90 tablet 1    colchicine (COLCRYS) 0.6 MG tablet TAKE ONE TABLET BY MOUTH DAILY 90 tablet 1    vitamin D (CHOLECALCIFEROL) 1000 UNIT TABS tablet Take 2,000 Int'l Units by mouth daily.  aspirin 325 MG tablet Take 325 mg by mouth daily.  finasteride (PROSCAR) 5 MG tablet Take 5 mg by mouth daily.

## 2022-03-17 NOTE — CONSULTS
Reason for Consultation/Chief Complaint: sob    History of Present Illness:  Avelina Gilbert is a [de-identified] y.o. patient whom we were asked to see for CHF. Also hx CAD/CABG. With assistance of . Notes several day hx of increasing sob. Notes increase in swelling. Not sure if has had wt gain. Notes that has not been able to sleep due to sob. When asked if watches salt/diet, chuckle and says does not. Also says he does not always take meds. No jase chest pain. No palp/tachy/syncope. Some chest tightness but not severe. Currently comfortable. Past Medical History:   has a past medical history of Contact dermatitis and other eczema, due to unspecified cause, Coronary atherosclerosis of unspecified type of vessel, native or graft, Gout, unspecified, Hypertension, Hypertrophy of prostate without urinary obstruction and other lower urinary tract symptoms (LUTS), Other and unspecified hyperlipidemia, Other chest pain, Pain in limb, Peripheral vascular complications, Screening PSA (prostate specific antigen), and Stool blood. Surgical History:   has a past surgical history that includes Coronary artery bypass graft (2003); Cardiac surgery; Colonoscopy (2/16/07); Colonoscopy (5.23.13); and Colonoscopy (06/18/2018). Social History:   reports that he quit smoking about 19 years ago. His smoking use included cigarettes. He has a 75.00 pack-year smoking history. He has quit using smokeless tobacco. He reports current alcohol use. He reports that he does not use drugs. Family History:  No evidence for sudden cardiac death or premature CAD    Home Medications:  Were reviewed and are listed in nursing record. and/or listed below  Prior to Admission medications    Medication Sig Start Date End Date Taking?  Authorizing Provider   isosorbide mononitrate (IMDUR) 60 MG extended release tablet Take 60 mg by mouth in the morning and at bedtime   Yes Historical Provider, MD   tamsulosin (FLOMAX) 0.4 MG capsule Take 0.4 mg by mouth daily   Yes Historical Provider, MD   atorvastatin (LIPITOR) 40 MG tablet Take 1 tablet by mouth daily 2/23/22   Monik Mccabe MD   allopurinol (ZYLOPRIM) 300 MG tablet TAKE ONE TABLET BY MOUTH DAILY 2/23/22   Monik Mccabe MD   amLODIPine (NORVASC) 2.5 MG tablet Take 1 tablet by mouth daily 2/23/22   Monik Mccabe MD   HYDROcodone-acetaminophen (NORCO) 5-325 MG per tablet Take 1 tablet by mouth every 6 hours as needed for Pain for up to 30 days. 2/23/22 3/25/22  Monik Mccabe MD   zolpidem (AMBIEN) 10 MG tablet Take 1 tablet by mouth nightly as needed for Sleep for up to 30 days. 2/23/22 3/25/22  Monik Mccabe MD   hydroCHLOROthiazide (HYDRODIURIL) 25 MG tablet TAKE ONE TABLET BY MOUTH DAILY 7/14/21   Monik Mccabe MD   colchicine (COLCRYS) 0.6 MG tablet TAKE ONE TABLET BY MOUTH DAILY 8/24/20   Monik Mccabe MD   vitamin D (CHOLECALCIFEROL) 1000 UNIT TABS tablet Take 2,000 Int'l Units by mouth daily. Historical Provider, MD   aspirin 325 MG tablet Take 325 mg by mouth daily. Historical Provider, MD   finasteride (PROSCAR) 5 MG tablet Take 5 mg by mouth daily. Historical Provider, MD        Allergies:  Sulfa antibiotics     Review of Systems:   All 12 point review of symptoms completed with some difficulty through . As noted above and otherwise negative as done. . Pertinent positives identified in the HPI      Physical Examination:    Vitals:    03/17/22 1308   BP: (!) 162/71   Pulse: 69   Resp: 11   Temp:    SpO2: 94%    Weight: 215 lb 9.6 oz (97.8 kg)         General Appearance:  Alert, cooperative, no distress, appears stated age   Head:  Normocephalic, without obvious abnormality, atraumatic   Eyes:  PERRL, conjunctiva/corneas clear       Nose: Nares normal, no drainage or sinus tenderness   Throat: Lips, mucosa, and tongue normal   Neck: Supple, symmetrical, trachea midline       Lungs:   Crackles bilat, respirations unlabored   Chest Wall:  No tenderness or deformity   Heart:  Regular rate and rhythm, S1, S2 normal, no murmur, rub or gallop   Abdomen:   Soft, non-tender, bowel sounds active all four quadrants           Extremities: Extremities normal, atraumatic, no cyanosis. 1-2+edema   Pulses: 2+ and symmetric   Skin: Skin color, texture, turgor normal, no rashes or lesions   Pysch: Normal mood and affect   Neurologic: Normal gross motor and sensory exam.         Labs  CBC:   Lab Results   Component Value Date    WBC 9.3 03/17/2022    RBC 4.80 03/17/2022    RBC 5.53 12/06/2010    HGB 12.5 03/17/2022    HCT 39.3 03/17/2022    MCV 81.8 03/17/2022    RDW 17.4 03/17/2022     03/17/2022     CMP:    Lab Results   Component Value Date     03/17/2022    K 4.6 03/17/2022     03/17/2022    CO2 23 03/17/2022    BUN 15 03/17/2022    CREATININE 1.1 03/17/2022    GFRAA >60 03/17/2022    GFRAA >60 03/28/2013    AGRATIO 2.2 02/24/2022    LABGLOM >60 03/17/2022    GLUCOSE 115 03/17/2022    GLUCOSE 105 08/08/2011    GLUCOSE 105 08/08/2011    PROT 6.7 02/24/2022    PROT 7.0 11/20/2012    CALCIUM 9.6 03/17/2022    BILITOT 0.9 02/24/2022    ALKPHOS 103 02/24/2022    AST 27 02/24/2022    ALT 30 02/24/2022     PT/INR:  No results found for: surespot  Lab Results   Component Value Date    CKTOTAL 47 07/06/2012    CKMB 1.5 07/06/2012    TROPONINI <0.01 03/17/2022           Assessment  Patient Active Problem List   Diagnosis    Gout    Coronary atherosclerosis    Benign non-nodular prostatic hyperplasia with lower urinary tract symptoms    Pain in limb    Hyperglycemia    Hypertension    Hx of colonic polyp    Chest pain    Hyperlipidemia    Other constipation    Multiple atypical skin moles    Vertigo    Age-related cataract of right eye    Atherosclerosis of native coronary artery of native heart with other form of angina pectoris ([address]    Pulmonary edema, acute (HCC)    Heart failure ([address]         Plan:    Acute chf with pulmonary edema.   Sounds non compliant with meds and diet. Doesn't always take meds. Likes salty foods, does not watch. Orthopnea/edema. Agree with IV lasix. Watch cr. Echo ordered. No chest pain. Trop negative.

## 2022-03-17 NOTE — PLAN OF CARE
Problem: Falls - Risk of:  Goal: Will remain free from falls  Description: Will remain free from falls  Outcome: Ongoing  Note:  Pt remains without falls during this shift. Pt does not attempt to get OOB alone. Pt able to call out appropriately, call light within reach. Safety precautions in place include fall armband, fall towel, chair & bed alarms, non slip foot wear. Signs posted on door, and door remains open for observation. The bed is in lowest position, wheels are locked, and rails are up 2/4. Will continue with current care.          Problem: Pain:  Goal: Pain level will decrease  Description: Pain level will decrease  Outcome: Ongoing     Problem: OXYGENATION/RESPIRATORY FUNCTION  Goal: Patient will achieve/maintain normal respiratory rate/effort  Description: Respiratory rate and effort will be within normal limits for the patient  Outcome: Ongoing

## 2022-03-17 NOTE — ED PROVIDER NOTES
[address] ENCOUNTER          ATTENDING PHYSICIAN NOTE       Date of evaluation: 3/17/2022    Chief Complaint     Chest Pain and Shortness of Breath      History of Present Illness     Gosia Do is a [de-identified] y.o. male who presents to the emergency department with a complaint of increased difficulty breathing and chest tightness. The patient has a history of hypertension hyperlipidemia and a related history of heart failure although had normal ejection fraction and systolic function seen on a cardiac stress test in 2019. The patient reports a several day history of increased difficulty breathing as well as lower extremity swelling. He has a sensation of chest tightness which is moderate in intensity across the center of his chest nonradiating without any alleviating or aggravating factors. Shortness of breath is worsened with exertion and has been steadily worsening over the course the past several days. Reports increased difficulty sleeping at night. Review of Systems     As documented in the HPI, otherwise all other systems were reviewed and were negative. Past Medical, Surgical, Family, and Social History     He has a past medical history of Contact dermatitis and other eczema, due to unspecified cause, Coronary atherosclerosis of unspecified type of vessel, native or graft, Gout, unspecified, Hypertension, Hypertrophy of prostate without urinary obstruction and other lower urinary tract symptoms (LUTS), Other and unspecified hyperlipidemia, Other chest pain, Pain in limb, Peripheral vascular complications, Screening PSA (prostate specific antigen), and Stool blood. He has a past surgical history that includes Coronary artery bypass graft (2003); Cardiac surgery; Colonoscopy (2/16/07); Colonoscopy (5.23.13); and Colonoscopy (06/18/2018). His family history includes Coronary Art Dis in his father. He reports that he quit smoking about 19 years ago.  His smoking use included cigarettes. He has a 75.00 pack-year smoking history. He has quit using smokeless tobacco. He reports current alcohol use. He reports that he does not use drugs. Medications     Previous Medications    ALLOPURINOL (ZYLOPRIM) 300 MG TABLET    TAKE ONE TABLET BY MOUTH DAILY    AMLODIPINE (NORVASC) 2.5 MG TABLET    Take 1 tablet by mouth daily    ASPIRIN 325 MG TABLET    Take 325 mg by mouth daily. ATORVASTATIN (LIPITOR) 40 MG TABLET    Take 1 tablet by mouth daily    CLOTRIMAZOLE-BETAMETHASONE (LOTRISONE) CREAM    Apply  topically 2 times daily. COLCHICINE (COLCRYS) 0.6 MG TABLET    TAKE ONE TABLET BY MOUTH DAILY    FINASTERIDE (PROSCAR) 5 MG TABLET    Take 5 mg by mouth daily. FISH OIL-OMEGA-3 FATTY ACIDS 1000 MG CAPSULE    Take 2 g by mouth daily. HYDROCHLOROTHIAZIDE (HYDRODIURIL) 25 MG TABLET    TAKE ONE TABLET BY MOUTH DAILY    HYDROCODONE-ACETAMINOPHEN (NORCO) 5-325 MG PER TABLET    Take 1 tablet by mouth every 6 hours as needed for Pain for up to 30 days. ISOSORBIDE MONONITRATE (IMDUR) 60 MG EXTENDED RELEASE TABLET    Take 2 tablets by mouth daily    NAFTIFINE (NAFTIN) 1 % CREAM    Apply  topically daily. VITAMIN D (CHOLECALCIFEROL) 1000 UNIT TABS TABLET    Take 2,000 Int'l Units by mouth daily. ZOLPIDEM (AMBIEN) 10 MG TABLET    Take 1 tablet by mouth nightly as needed for Sleep for up to 30 days. Allergies     He is allergic to sulfa antibiotics.     Physical Exam     INITIAL VITALS: BP: (!) 198/75, Temp: 98.1 °F (36.7 °C), Pulse: 65, Resp: 30, SpO2: 97 %   General: 67-year-old male sitting in bed appears quite dyspneic with increased respiratory rate  HEENT:  head is atraumatic, pupils equal round and reactive to light, sclera are clear, oropharynx is nonerythematous  Neck: supple, no lymphadenopathy  Chest: Increased respiratory rate and labored respirations with coarse crackles and occasional expiratory wheezing heard throughout all lung fields  Cardiovascular: Irregularly irregular rhythm with a normal rate, 2+ radial pulses bilaterally, capillary refill 2 seconds, bilateral lower extremity pitting edema to the knees  Abdominal: Soft, nontender, nondistended, positive bowel sounds throughout, no rebound or guarding  Skin: Warm, dry well perfused, no rashes  Musculoskeletal: no obvious deformities, no tenderness to palpation diffusely  Neurologic:  alert and oriented x4, speech is clear and intact without dysarthria, moves all 4 extremities with full and equal strength    Diagnostic Results     EKG   Atrial fibrillation with a ventricular rate of 74 several PVCs are noted, comparison to prior EKG, dated October 23, 2017 there are no significant interval changes the EKG at that time was also read as possible atrial fibrillation    RADIOLOGY:  XR CHEST PORTABLE   Final Result      Mild pulmonary edema with peribronchial cuffing. Borderline cardiomegaly status post sternotomy.           LABS:   Results for orders placed or performed during the hospital encounter of 03/17/22   CBC with Auto Differential   Result Value Ref Range    WBC 9.3 4.0 - 11.0 K/uL    RBC 4.80 4.20 - 5.90 M/uL    Hemoglobin 12.5 (L) 13.5 - 17.5 g/dL    Hematocrit 39.3 (L) 40.5 - 52.5 %    MCV 81.8 80.0 - 100.0 fL    MCH 26.1 26.0 - 34.0 pg    MCHC 31.9 31.0 - 36.0 g/dL    RDW 17.4 (H) 12.4 - 15.4 %    Platelets 243 893 - 593 K/uL    MPV 8.8 5.0 - 10.5 fL    Neutrophils % 81.5 %    Lymphocytes % 7.8 %    Monocytes % 9.4 %    Eosinophils % 0.9 %    Basophils % 0.4 %    Neutrophils Absolute 7.6 1.7 - 7.7 K/uL    Lymphocytes Absolute 0.7 (L) 1.0 - 5.1 K/uL    Monocytes Absolute 0.9 0.0 - 1.3 K/uL    Eosinophils Absolute 0.1 0.0 - 0.6 K/uL    Basophils Absolute 0.0 0.0 - 0.2 K/uL   Basic Metabolic Panel   Result Value Ref Range    Sodium 140 136 - 145 mmol/L    Potassium 4.6 3.5 - 5.1 mmol/L    Chloride 107 99 - 110 mmol/L    CO2 23 21 - 32 mmol/L    Anion Gap 10 3 - 16    Glucose 115 (H) 70 - 99 mg/dL BUN 15 7 - 20 mg/dL    CREATININE 1.1 0.8 - 1.3 mg/dL    GFR Non-African American >60 >60    GFR African American >60 >60    Calcium 9.6 8.3 - 10.6 mg/dL   Phosphorus   Result Value Ref Range    Phosphorus 2.7 2.5 - 4.9 mg/dL   Magnesium   Result Value Ref Range    Magnesium 2.00 1.80 - 2.40 mg/dL   Troponin   Result Value Ref Range    Troponin <0.01 <0.01 ng/mL   Brain Natriuretic Peptide   Result Value Ref Range    Pro-BNP 1,672 (H) 0 - 449 pg/mL       RECENT VITALS:  BP: (!) 198/75, Temp: 98.1 °F (36.7 °C), Pulse: 65, Resp: 30, SpO2: 97 %       ED Course     Nursing Notes, Past Medical Hx, Past Surgical Hx, Social Hx, Allergies, and Family Hx were reviewed. The patient was given the following medications:  Orders Placed This Encounter   Medications    nitroGLYCERIN (NITROSTAT) SL tablet 0.4 mg    DISCONTD: ipratropium-albuterol (DUONEB) nebulizer solution 1 ampule     Order Specific Question:   Initiate RT Bronchodilator Protocol     Answer: Yes    furosemide (LASIX) injection 40 mg       CONSULTS:  Mavis Cohen / ASSESSMENT / Nemo Bipin is a [de-identified] y.o. male who presents to the emergency department with a complaint of increased difficulty breathing on physical examination the patient was notably tachypneic with labored respirations had coarse crackles and wheezes heard throughout all lung fields. In the setting of his significant hypertension and lower extremity edema I felt this was more likely a CHF exacerbation patient has no prior history of emphysema or chronic bronchitis. Patient was initially given nitrates to lower his blood pressure and laboratory investigations were sent EKG showed atrial fibrillation which had possibly been noted on a previous EKG in 2017. Otherwise no significant ischemic changes were noted.   The patient's chest x-ray was remarkable for pulmonary edema, his CBC was within normal limits basic metabolic profile showed no evidence of any renal dysfunction potassium was 4.6 the patient had a BNP which was significant elevated troponin was within normal limits. The patient received nitrates for control of his blood pressure with improvement of work of breathing as well as improvement of his blood pressure down into the 680C systolic. The patient has also been ordered for Lasix 40 mg IV. The patient has a self related history of heart failure although has no objective diagnostic evaluation for heart failure in the past.  At this point time I do feel the patient requires admission the hospital for further care and management of his heart failure and pulmonary edema. .    Clinical Impression     1. Acute pulmonary edema (HCC)    2. Acute congestive heart failure, unspecified heart failure type ([address]    3. Atrial fibrillation, unspecified type ([address]        Disposition     PATIENT REFERRED TO:  No follow-up provider specified.     DISCHARGE MEDICATIONS:  New Prescriptions    No medications on file       DISPOSITION Decision To Admit 03/17/2022 09:16:26 AM         Ramon Garcia MD  03/17/22 6911

## 2022-03-17 NOTE — H&P
Internal Medicine  PGY 1  History & Physical      CC: Chest pain, SOB    History Obtained From:  patient, electronic medical record    HISTORY OF PRESENT ILLNESS:  Guido Arellano is an [de-identified]year old Male with a PmHx of HTN, HLD, rectal bleeding, Afib, CAD, gout who presented with SOB and chest tightness/pain. Complains of chest tightness of moderate intensity generalized across chest, non-radiating, with nothing making it better or worse. Chronic and intermittent since his last CABG, resolved at time of medical interview. SOB is worsened with exertion and has progressed over the last several days. Progressive difficulty sleeping at night due to orthopnea. Chest XR showed pulmonary edema worsened from prior. No known hx of heart failure. HF with no ECHO to confirm. EF normal on prior stress test in 2019. Chronic A-fib on EKG with no ischemia. Rate controlled. Initially hypertensive with /75 with tachypnea and crackles on exam on RA. BP improved to 140's with nitrate. Received Lasix 40 IV once in the ED. Follows with Dr. Johnson Fees with cardiology as an outpatient. Last stress test in 2019. Pro-BNP elevated to 1672 with negative troponin. COVID and flu negative. Denies fevers, chills, N/V, light-headedness, syncope, weakness, constipation, diarrhea, cough, sneezing, myalgias.     Past Medical History:        Diagnosis Date    Contact dermatitis and other eczema, due to unspecified cause     Coronary atherosclerosis of unspecified type of vessel, native or graft     Cardio- Dr. Deal Stagers Gout, unspecified     Hypertension     Hypertrophy of prostate without urinary obstruction and other lower urinary tract symptoms (LUTS)     Other and unspecified hyperlipidemia     Other chest pain     Pain in limb     Foot Pain    Peripheral vascular complications     Screening PSA (prostate specific antigen) 2/19/2009    1.5    Stool blood 7/21/2008    Guiac Negative   ·     Past Surgical History:        Procedure Laterality Date    CARDIAC SURGERY      4 vessel cabg Dr. Myla Mantilla y    COLONOSCOPY  2/16/07    Dr. Asif Lambert 2/2012    COLONOSCOPY  5.23.13    Ana Shields MD polyp repeat 2018    COLONOSCOPY  06/18/2018    Dr. Laura Wright polyp removed no repeat necessary   [facility]  2003    Dr. Shorty Selby   ·     No current facility-administered medications on file prior to encounter. Current Outpatient Medications on File Prior to Encounter   Medication Sig Dispense Refill    isosorbide mononitrate (IMDUR) 60 MG extended release tablet Take 60 mg by mouth in the morning and at bedtime      tamsulosin (FLOMAX) 0.4 MG capsule Take 0.4 mg by mouth daily      atorvastatin (LIPITOR) 40 MG tablet Take 1 tablet by mouth daily 90 tablet 1    allopurinol (ZYLOPRIM) 300 MG tablet TAKE ONE TABLET BY MOUTH DAILY 90 tablet 1    amLODIPine (NORVASC) 2.5 MG tablet Take 1 tablet by mouth daily 90 tablet 1    HYDROcodone-acetaminophen (NORCO) 5-325 MG per tablet Take 1 tablet by mouth every 6 hours as needed for Pain for up to 30 days. 30 tablet 0    zolpidem (AMBIEN) 10 MG tablet Take 1 tablet by mouth nightly as needed for Sleep for up to 30 days. 90 tablet 0    hydroCHLOROthiazide (HYDRODIURIL) 25 MG tablet TAKE ONE TABLET BY MOUTH DAILY 90 tablet 1    colchicine (COLCRYS) 0.6 MG tablet TAKE ONE TABLET BY MOUTH DAILY 90 tablet 1    vitamin D (CHOLECALCIFEROL) 1000 UNIT TABS tablet Take 2,000 Int'l Units by mouth daily.  aspirin 325 MG tablet Take 325 mg by mouth daily.  finasteride (PROSCAR) 5 MG tablet Take 5 mg by mouth daily. Allergies:  Sulfa antibiotics    Social History:   · TOBACCO:   reports that he quit smoking about 19 years ago. His smoking use included cigarettes. He has a 75.00 pack-year smoking history. He has quit using smokeless tobacco. Quit smoking in 2002  · ETOH:   reports current alcohol use. · DRUGS : no reported use  · Patient currently lives with wife. He is retired. Takes care of his ADLs. · Has one adult son. ·   Family History:       Problem Relation Age of Onset    Coronary Art Dis Father    ·     Review of Systems    ROS: A 10 point review of systems was conducted, significant findings as noted in HPI. Physical Exam  Constitutional:       General: He is not in acute distress. Appearance: He is ill-appearing. HENT:      Head: Normocephalic. Right Ear: External ear normal.      Left Ear: External ear normal.      Nose: Nose normal.      Mouth/Throat:      Mouth: Mucous membranes are moist.      Pharynx: No oropharyngeal exudate or posterior oropharyngeal erythema. Eyes:      General:         Right eye: No discharge. Left eye: No discharge. Conjunctiva/sclera: Conjunctivae normal.      Pupils: Pupils are equal, round, and reactive to light. Cardiovascular:      Rate and Rhythm: Normal rate. Rhythm irregular. Pulses: Normal pulses. Heart sounds: Normal heart sounds. No murmur heard. No gallop. Pulmonary:      Breath sounds: No stridor. Rales (bilateral lung bases) present. No wheezing or rhonchi. Comments: Increased respiratory effort  Abdominal:      General: Bowel sounds are normal. There is distension. Palpations: Abdomen is soft. Tenderness: There is no abdominal tenderness. There is no guarding or rebound. Musculoskeletal:         General: Swelling present. No deformity. Cervical back: Normal range of motion. Right lower leg: Edema present. Left lower leg: Edema present. Comments: 2+ pitting edema in b/l LE   Skin:     General: Skin is warm. Coloration: Skin is not jaundiced. Findings: No bruising or lesion. Neurological:      General: No focal deficit present. Mental Status: He is alert. Cranial Nerves: No cranial nerve deficit. Motor: No weakness.       Gait: Gait normal.   Psychiatric:         Mood and Affect: Mood normal.         Behavior: Behavior normal. Vitals:    03/17/22 0858   BP: 137/70   Pulse: 61   Resp: 19   Temp:    SpO2: 96%       DATA:    Labs:  CBC:   Recent Labs     03/17/22 0824   WBC 9.3   HGB 12.5*   HCT 39.3*          BMP:   Recent Labs     03/17/22 0824      K 4.6      CO2 23   BUN 15   CREATININE 1.1   GLUCOSE 115*   PHOS 2.7     LFT's: No results for input(s): AST, ALT, ALB, BILITOT, ALKPHOS in the last 72 hours. Troponin:   Recent Labs     03/17/22 0824   TROPONINI <0.01         XR CHEST PORTABLE   Final Result      Mild pulmonary edema with peribronchial cuffing. Borderline cardiomegaly status post sternotomy. ASSESSMENT AND PLAN:    Acute Heart Failure, likely preserved EF  Last available imaging was a stress test in 2019 that showed a low risk scan with normal LV size and EF WNL. Small fixed defect involving the apex, otherwise no evidence of ischemia. - Cardiology consulted, appreciate recs  - Lasix 40 mg BID, not on at home  - ECHO ordered  - BMP and mag q12h, replenish electrolytes as needed  - Daily weights  - Strict I/O  - Consult dietician  - Continuous tele  - TSH with reflex  - Lipid panel    CAD s/p CABG  - Continue home aspirin, statin  - Nitroglycerine ointment for any anginal pain, PRN    HTN  Hypertensive to 's on admission, improved after nitrate  - Home amlodipine, HCTZ,   - Sublingual nitroglycerine PRN  - Home imdur  - Lipid panel    Urinary retention 2/2   Was able to void on admission, now has urinary retention, >500 after bladder scan  - Sen placed  - Home tamsulosin    HLD  - Lipitor 40 mg    Osteoarthritis  - Home Norco    Gout  - Continue home allopurinol   - Holding colchicine, not in exacerbation    Will discuss with attending physician Dr. Hilary Olivera    Code Status: Full code  FEN: Cardiac diet  PPX: Lovenox  DISPO: Ana Ashby MD  3/17/2022,  10:46 AM    Addendum to Resident H& P/Progress note:  I have personally seen,examined and evaluated the patient.  I have reviewed the current history, physical findings, labs and assessment and plan and agree with note as documented by resident MD ( Deysi Maxwell)    2d echo: Normal left ventricle size. There is mild to moderate concentric left    ventricular hypertrophy. Overall left ventricular systolic function appears    normal 55-60%. No regional wall motion abnormalities are noted.    Indeterminate diastolic function.    Mild to moderate mitral regurgitation is present.    Mild to moderate tricuspid regurgitation.    Estimated pulmonary artery systolic pressure is 50 mmHg assuming a right    atrial pressure of 8 mmHg.    Inferior vena cava appears dilated.  IVC respiratory change in dimension < 50%.       + chronic a fib    + acute on chronic diastolic heart failure    Jannette Bergeron MD, [address]

## 2022-03-18 ENCOUNTER — NURSE ONLY (OUTPATIENT)
Dept: CARDIOLOGY CLINIC | Age: 82
End: 2022-03-18

## 2022-03-18 PROBLEM — R00.1 BRADYCARDIA: Status: ACTIVE | Noted: 2022-03-18

## 2022-03-18 LAB
ANION GAP SERPL CALCULATED.3IONS-SCNC: 12 MMOL/L (ref 3–16)
ANION GAP SERPL CALCULATED.3IONS-SCNC: 16 MMOL/L (ref 3–16)
BASOPHILS ABSOLUTE: 0 K/UL (ref 0–0.2)
BASOPHILS RELATIVE PERCENT: 0.4 %
BUN BLDV-MCNC: 19 MG/DL (ref 7–20)
BUN BLDV-MCNC: 23 MG/DL (ref 7–20)
CALCIUM SERPL-MCNC: 10 MG/DL (ref 8.3–10.6)
CALCIUM SERPL-MCNC: 9.9 MG/DL (ref 8.3–10.6)
CHLORIDE BLD-SCNC: 102 MMOL/L (ref 99–110)
CHLORIDE BLD-SCNC: 98 MMOL/L (ref 99–110)
CHOLESTEROL, TOTAL: 136 MG/DL (ref 0–199)
CO2: 25 MMOL/L (ref 21–32)
CO2: 27 MMOL/L (ref 21–32)
CREAT SERPL-MCNC: 1.4 MG/DL (ref 0.8–1.3)
CREAT SERPL-MCNC: 1.6 MG/DL (ref 0.8–1.3)
EOSINOPHILS ABSOLUTE: 0.2 K/UL (ref 0–0.6)
EOSINOPHILS RELATIVE PERCENT: 2.3 %
GFR AFRICAN AMERICAN: 51
GFR AFRICAN AMERICAN: 59
GFR NON-AFRICAN AMERICAN: 42
GFR NON-AFRICAN AMERICAN: 49
GLUCOSE BLD-MCNC: 102 MG/DL (ref 70–99)
GLUCOSE BLD-MCNC: 108 MG/DL (ref 70–99)
HCT VFR BLD CALC: 38 % (ref 40.5–52.5)
HDLC SERPL-MCNC: 37 MG/DL (ref 40–60)
HEMOGLOBIN: 12.4 G/DL (ref 13.5–17.5)
LDL CHOLESTEROL CALCULATED: 82 MG/DL
LYMPHOCYTES ABSOLUTE: 1 K/UL (ref 1–5.1)
LYMPHOCYTES RELATIVE PERCENT: 11.4 %
MAGNESIUM: 1.9 MG/DL (ref 1.8–2.4)
MAGNESIUM: 2 MG/DL (ref 1.8–2.4)
MCH RBC QN AUTO: 26.6 PG (ref 26–34)
MCHC RBC AUTO-ENTMCNC: 32.7 G/DL (ref 31–36)
MCV RBC AUTO: 81.3 FL (ref 80–100)
MONOCYTES ABSOLUTE: 1.2 K/UL (ref 0–1.3)
MONOCYTES RELATIVE PERCENT: 13.8 %
NEUTROPHILS ABSOLUTE: 6.3 K/UL (ref 1.7–7.7)
NEUTROPHILS RELATIVE PERCENT: 72.1 %
PDW BLD-RTO: 17.3 % (ref 12.4–15.4)
PLATELET # BLD: 196 K/UL (ref 135–450)
PMV BLD AUTO: 8.7 FL (ref 5–10.5)
POTASSIUM SERPL-SCNC: 4.1 MMOL/L (ref 3.5–5.1)
POTASSIUM SERPL-SCNC: 4.6 MMOL/L (ref 3.5–5.1)
RBC # BLD: 4.67 M/UL (ref 4.2–5.9)
SODIUM BLD-SCNC: 139 MMOL/L (ref 136–145)
SODIUM BLD-SCNC: 141 MMOL/L (ref 136–145)
TRIGL SERPL-MCNC: 83 MG/DL (ref 0–150)
TSH REFLEX: 2.27 UIU/ML (ref 0.27–4.2)
VLDLC SERPL CALC-MCNC: 17 MG/DL
WBC # BLD: 8.7 K/UL (ref 4–11)

## 2022-03-18 PROCEDURE — 80048 BASIC METABOLIC PNL TOTAL CA: CPT

## 2022-03-18 PROCEDURE — 2580000003 HC RX 258: Performed by: STUDENT IN AN ORGANIZED HEALTH CARE EDUCATION/TRAINING PROGRAM

## 2022-03-18 PROCEDURE — 6370000000 HC RX 637 (ALT 250 FOR IP): Performed by: INTERNAL MEDICINE

## 2022-03-18 PROCEDURE — 80061 LIPID PANEL: CPT

## 2022-03-18 PROCEDURE — 99232 SBSQ HOSP IP/OBS MODERATE 35: CPT | Performed by: HOSPITALIST

## 2022-03-18 PROCEDURE — 51798 US URINE CAPACITY MEASURE: CPT

## 2022-03-18 PROCEDURE — 2060000000 HC ICU INTERMEDIATE R&B

## 2022-03-18 PROCEDURE — 83735 ASSAY OF MAGNESIUM: CPT

## 2022-03-18 PROCEDURE — 96372 THER/PROPH/DIAG INJ SC/IM: CPT

## 2022-03-18 PROCEDURE — 93246 EXT ECG>7D<15D RECORDING: CPT | Performed by: INTERNAL MEDICINE

## 2022-03-18 PROCEDURE — 99233 SBSQ HOSP IP/OBS HIGH 50: CPT | Performed by: INTERNAL MEDICINE

## 2022-03-18 PROCEDURE — 85025 COMPLETE CBC W/AUTO DIFF WBC: CPT

## 2022-03-18 PROCEDURE — 6360000002 HC RX W HCPCS: Performed by: STUDENT IN AN ORGANIZED HEALTH CARE EDUCATION/TRAINING PROGRAM

## 2022-03-18 PROCEDURE — G0378 HOSPITAL OBSERVATION PER HR: HCPCS

## 2022-03-18 PROCEDURE — 36415 COLL VENOUS BLD VENIPUNCTURE: CPT

## 2022-03-18 PROCEDURE — 99223 1ST HOSP IP/OBS HIGH 75: CPT | Performed by: NURSE PRACTITIONER

## 2022-03-18 PROCEDURE — 6370000000 HC RX 637 (ALT 250 FOR IP): Performed by: STUDENT IN AN ORGANIZED HEALTH CARE EDUCATION/TRAINING PROGRAM

## 2022-03-18 RX ORDER — FUROSEMIDE 10 MG/ML
40 INJECTION INTRAMUSCULAR; INTRAVENOUS DAILY
Status: DISCONTINUED | OUTPATIENT
Start: 2022-03-18 | End: 2022-03-19

## 2022-03-18 RX ORDER — FUROSEMIDE 10 MG/ML
40 INJECTION INTRAMUSCULAR; INTRAVENOUS ONCE
Status: DISCONTINUED | OUTPATIENT
Start: 2022-03-18 | End: 2022-03-18

## 2022-03-18 RX ORDER — ASPIRIN 81 MG/1
81 TABLET ORAL DAILY
Status: DISCONTINUED | OUTPATIENT
Start: 2022-03-19 | End: 2022-03-19 | Stop reason: HOSPADM

## 2022-03-18 RX ADMIN — ISOSORBIDE MONONITRATE 60 MG: 60 TABLET, EXTENDED RELEASE ORAL at 09:33

## 2022-03-18 RX ADMIN — ALLOPURINOL 300 MG: 300 TABLET ORAL at 09:33

## 2022-03-18 RX ADMIN — AMLODIPINE BESYLATE 2.5 MG: 5 TABLET ORAL at 09:33

## 2022-03-18 RX ADMIN — FINASTERIDE 5 MG: 5 TABLET, FILM COATED ORAL at 09:33

## 2022-03-18 RX ADMIN — ENOXAPARIN SODIUM 40 MG: 40 INJECTION SUBCUTANEOUS at 09:34

## 2022-03-18 RX ADMIN — ATORVASTATIN CALCIUM 40 MG: 40 TABLET, FILM COATED ORAL at 09:34

## 2022-03-18 RX ADMIN — TAMSULOSIN HYDROCHLORIDE 0.4 MG: 0.4 CAPSULE ORAL at 09:45

## 2022-03-18 RX ADMIN — SODIUM CHLORIDE, PRESERVATIVE FREE 10 ML: 5 INJECTION INTRAVENOUS at 10:20

## 2022-03-18 RX ADMIN — FAMOTIDINE 20 MG: 20 TABLET ORAL at 20:12

## 2022-03-18 RX ADMIN — FAMOTIDINE 20 MG: 20 TABLET ORAL at 09:33

## 2022-03-18 RX ADMIN — FUROSEMIDE 40 MG: 10 INJECTION, SOLUTION INTRAMUSCULAR; INTRAVENOUS at 12:26

## 2022-03-18 RX ADMIN — APIXABAN 5 MG: 5 TABLET, FILM COATED ORAL at 20:12

## 2022-03-18 RX ADMIN — ASPIRIN 325 MG: 325 TABLET ORAL at 09:34

## 2022-03-18 RX ADMIN — SODIUM CHLORIDE, PRESERVATIVE FREE 5 ML: 5 INJECTION INTRAVENOUS at 20:14

## 2022-03-18 RX ADMIN — ISOSORBIDE MONONITRATE 60 MG: 60 TABLET, EXTENDED RELEASE ORAL at 20:12

## 2022-03-18 ASSESSMENT — PAIN SCALES - GENERAL
PAINLEVEL_OUTOF10: 0

## 2022-03-18 NOTE — PROGRESS NOTES
Progress Note    Admit Date: 3/17/2022  Day: 1  Diet: ADULT DIET; Regular; Low Fat/Low Chol/High Fiber/2 gm Na; Low Sodium (2 gm)    CC: CP and SOB    Interval history:   NAONE. Patient was seen and examined this am. SOB improved. Patient denies any CP, N/V, or abdominal pain. Made 3.9L of urine in the last 24 hours. Bradycardic but otherwise hemodynamically stable. HPI:   Susan Wright is an [de-identified]year old Male with a PmHx of HTN, HLD, rectal bleeding, Afib, CAD, gout who presented with SOB and chest tightness/pain. Complains of chest tightness of moderate intensity generalized across chest, non-radiating, with nothing making it better or worse. Chronic and intermittent since his last CABG, resolved at time of medical interview. SOB is worsened with exertion and has progressed over the last several days. Progressive difficulty sleeping at night due to orthopnea. Chest XR showed pulmonary edema worsened from prior. No known hx of heart failure.     HF with no ECHO to confirm. EF normal on prior stress test in 2019. Chronic A-fib on EKG with no ischemia. Rate controlled. Initially hypertensive with /75 with tachypnea and crackles on exam on RA. BP improved to 140's with nitrate. Received Lasix 40 IV once in the ED. Follows with Dr. Brenda Isabel with cardiology as an outpatient. Last stress test in 2019. Pro-BNP elevated to 1672 with negative troponin. COVID and flu negative. Denies fevers, chills, N/V, light-headedness, syncope, weakness, constipation, diarrhea, cough, sneezing, myalgias.     Medications:     Scheduled Meds:   furosemide  40 mg IntraVENous Once    amLODIPine  2.5 mg Oral Daily    aspirin  325 mg Oral Daily    atorvastatin  40 mg Oral Daily    finasteride  5 mg Oral Daily    isosorbide mononitrate  60 mg Oral BID    tamsulosin  0.4 mg Oral Daily    sodium chloride flush  5-40 mL IntraVENous 2 times per day    enoxaparin  40 mg SubCUTAneous Daily    famotidine  20 mg Oral BID    allopurinol  300 mg Oral Daily    hydroCHLOROthiazide  25 mg Oral Daily     Continuous Infusions:   sodium chloride       PRN Meds:nitroGLYCERIN, HYDROcodone-acetaminophen, sodium chloride flush, sodium chloride, ondansetron **OR** ondansetron, polyethylene glycol, acetaminophen **OR** acetaminophen, perflutren lipid microspheres, zolpidem, nitroglycerin    Objective:   Vitals:   T-max:  Patient Vitals for the past 8 hrs:   BP Temp Temp src Pulse Resp SpO2 Weight   03/18/22 0803 (!) 141/71 98 °F (36.7 °C) Oral 67 20 93 % --   03/18/22 0644 -- -- -- -- -- -- 197 lb 1.5 oz (89.4 kg)   03/18/22 0437 135/67 98 °F (36.7 °C) Oral 55 18 94 % --       Intake/Output Summary (Last 24 hours) at 3/18/2022 3343  Last data filed at 3/18/2022 0437  Gross per 24 hour   Intake 120 ml   Output 3900 ml   Net -3780 ml       Review of Systems - as above    Physical Exam  Constitutional:       General: He is not in acute distress. HENT:      Head: Normocephalic and atraumatic. Mouth/Throat:      Mouth: Mucous membranes are moist.   Eyes:      General: No scleral icterus. Conjunctiva/sclera: Conjunctivae normal.   Cardiovascular:      Rate and Rhythm: Rhythm irregular. Heart sounds: No murmur heard. No gallop. Pulmonary:      Effort: No respiratory distress. Breath sounds: Rales present. No wheezing. Abdominal:      General: There is no distension. Tenderness: There is no abdominal tenderness. There is no guarding. Musculoskeletal:      Right lower leg: Edema present. Left lower leg: Edema present. Neurological:      General: No focal deficit present. Mental Status: He is alert.          LABS:    CBC:   Recent Labs     03/17/22  0824 03/18/22  0443   WBC 9.3 8.7   HGB 12.5* 12.4*   HCT 39.3* 38.0*    196   MCV 81.8 81.3     Renal:    Recent Labs     03/17/22  0824 03/17/22  1524 03/18/22  0442    141 139   K 4.6 4.4 4.1    103 102   CO2 23 24 25   BUN 15 14 19   CREATININE 1.1 1.2 1.4* GLUCOSE 115* 97 102*   CALCIUM 9.6 10.0 9.9   MG 2.00 1.90 1.90   PHOS 2.7  --   --    ANIONGAP 10 14 12     Hepatic: No results for input(s): AST, ALT, BILITOT, BILIDIR, PROT, LABALBU, ALKPHOS in the last 72 hours. Troponin:   Recent Labs     03/17/22  0824   TROPONINI <0.01     -----------------------------------------------------------------  RAD:   XR CHEST PORTABLE   Final Result      Mild pulmonary edema with peribronchial cuffing. Borderline cardiomegaly status post sternotomy. Assessment/Plan:     Acute HFpEF  Last available imaging was a stress test in 2019 that showed a low risk scan with normal LV size and EF WNL. Small fixed defect involving the apex, otherwise no evidence of ischemia. Echo this admission shows overall left ventricular systolic function appears normal 55-60%. No regional wall motion abnormalities are noted. Indeterminate diastolic function. - Cardiology following  - Lasix 40 mg x1, appreciate cards recs on diuresis  - BMP and mag q12h, replenish electrolytes as needed  - Daily weights  - Strict I/O  - Continuous tele  - SUMEET hose      CAD s/p CABG  In 2003, CABG x4.   - Continue home aspirin, statin  - Nitroglycerine ointment for any anginal pain, PRN     HTN  Hypertensive to 's on admission, improved after nitrate  - Continue home amlodipine  - Continue home imdur     Urinary retention   Was able to void on admission, now has urinary retention, >500 after bladder scan  - Remove fowler  - Continue Home tamsulosin     HLD  - Continue home Lipitor     Osteoarthritis  - Home Norco     Gout  - Continue home allopurinol   - Holding colchicine, not in exacerbation      Code Status: Full Code  FEN: ADULT DIET; Regular; Low Fat/Low Chol/High Fiber/2 gm Na;  Low Sodium (2 gm)  PPX: Lovenox  DISPO: Julia Leon MD, PGY-1  03/18/22  9:18 AM    This patient has been staffed and discussed with Lana Barkley MD.     Addendum to Resident H& P/Progress note:  I have

## 2022-03-18 NOTE — PLAN OF CARE
Problem: HEMODYNAMIC STATUS  Goal: Patient has stable vital signs and fluid balance  Outcome: Ongoing     Problem: FLUID AND ELECTROLYTE IMBALANCE  Goal: Fluid and electrolyte balance are achieved/maintained  Outcome: Ongoing  Note: Pt on Lasix IVP BID, diuresing well with good urine output. Will continue to monitor.

## 2022-03-18 NOTE — PLAN OF CARE
Problem: OXYGENATION/RESPIRATORY FUNCTION  Goal: Patient will maintain patent airway  Outcome: Ongoing  Note: Pt's sats remain above 90 on room air.  Will continue to assess     Problem: HEMODYNAMIC STATUS  Goal: Patient has stable vital signs and fluid balance  Outcome: Ongoing     Problem: FLUID AND ELECTROLYTE IMBALANCE  Goal: Fluid and electrolyte balance are achieved/maintained  Outcome: Ongoing     Problem: Respiratory:  Goal: Ability to maintain a clear airway will improve  Description: Ability to maintain a clear airway will improve  Outcome: Ongoing

## 2022-03-18 NOTE — CONSULTS
[address]   Electrophysiology Nurse Practitioner  Consult Rounding    Date: 3/18/2022    Reason for Consultation/ Chief Complaint: bradycardia    Patient interviewed, examined and pertinent medical data and imaging studies reviewed. Refer to the NP's note for details of clinical findings, assessment and plan with which I agree. Corrections and additions as noted:     A. fib with slow ventricular rate -no long pauses; no episodes of tachycardia in the telemetry  No symptoms of presyncope/syncope  Start Eliquis 5 mg p.o. twice daily  Stop aspirin 325 mg and transition to aspirin 81 mg daily  Cardiac monitor for 2 weeks at the time of discharge    Michael Dyson MD   Cardiac Electrophysiology  [address] [facility] [phone]    History Obtained From: Patient and medical record. Cardiac Hx: Arnaldo Stevenson is a [de-identified] y.o. man w/ PMH HTN, HLD, HFpEF, CAD, s/p CABG (2003), follows w/ Dr. Daja Bahgat outpatient, who p/w SOB, increased BLE edema, found to have acute chf w/ pulm edema, noted to have bradycardia on telemetry    HPI: Patient presented to the hospital with c/o SOB, chest tightness and BLE edema. BP elevated, received nitrate, BNP 1672, given IV lasix 40 mg x 1 dose. Patient also noted to be in AF on EKG, rates controlled. Unclear how long patient has had AF, per pt he thinks he has had for 20 years however no previous cardiology notes address it and he is not on [address]. States when he takes his BP at home it states his pulse is irregular. Overnight patient noted to have HR as low as 32 bpm while sleeping, pt asymptomatic, some pauses also noted on tele all <2.5 seconds. Patient states his breathing has improved since he came in, still endorses occasional CP, no palps, dizziness, LH, syncopal events. BLE edema has improved. He does endorse snoring at night, orthopnea, sleeps in a recliner at times.     Home medications:   No current facility-administered medications on file prior to encounter. Current Outpatient Medications on File Prior to Encounter   Medication Sig Dispense Refill    isosorbide mononitrate (IMDUR) 60 MG extended release tablet Take 60 mg by mouth in the morning and at bedtime      tamsulosin (FLOMAX) 0.4 MG capsule Take 0.4 mg by mouth daily      atorvastatin (LIPITOR) 40 MG tablet Take 1 tablet by mouth daily 90 tablet 1    allopurinol (ZYLOPRIM) 300 MG tablet TAKE ONE TABLET BY MOUTH DAILY 90 tablet 1    amLODIPine (NORVASC) 2.5 MG tablet Take 1 tablet by mouth daily 90 tablet 1    HYDROcodone-acetaminophen (NORCO) 5-325 MG per tablet Take 1 tablet by mouth every 6 hours as needed for Pain for up to 30 days. 30 tablet 0    zolpidem (AMBIEN) 10 MG tablet Take 1 tablet by mouth nightly as needed for Sleep for up to 30 days. 90 tablet 0    hydroCHLOROthiazide (HYDRODIURIL) 25 MG tablet TAKE ONE TABLET BY MOUTH DAILY 90 tablet 1    colchicine (COLCRYS) 0.6 MG tablet TAKE ONE TABLET BY MOUTH DAILY 90 tablet 1    vitamin D (CHOLECALCIFEROL) 1000 UNIT TABS tablet Take 2,000 Int'l Units by mouth daily.  aspirin 325 MG tablet Take 325 mg by mouth daily.  finasteride (PROSCAR) 5 MG tablet Take 5 mg by mouth daily.          Scheduled Meds:   furosemide  40 mg IntraVENous Daily    amLODIPine  2.5 mg Oral Daily    aspirin  325 mg Oral Daily    atorvastatin  40 mg Oral Daily    finasteride  5 mg Oral Daily    isosorbide mononitrate  60 mg Oral BID    tamsulosin  0.4 mg Oral Daily    sodium chloride flush  5-40 mL IntraVENous 2 times per day    enoxaparin  40 mg SubCUTAneous Daily    famotidine  20 mg Oral BID    allopurinol  300 mg Oral Daily     Continuous Infusions:   sodium chloride       PRN Meds:nitroGLYCERIN, HYDROcodone-acetaminophen, sodium chloride flush, sodium chloride, ondansetron **OR** ondansetron, polyethylene glycol, acetaminophen **OR** acetaminophen, perflutren lipid microspheres, zolpidem, nitroglycerin       Past Medical History: Diagnosis Date    Contact dermatitis and other eczema, due to unspecified cause     Coronary atherosclerosis of unspecified type of vessel, native or graft     Cardio- Dr. Cecelia Ponce Diastolic CHF [facility])     Gout, unspecified     Hypertension     Hypertrophy of prostate without urinary obstruction and other lower urinary tract symptoms (LUTS)     Other and unspecified hyperlipidemia     Peripheral vascular complications         Past Surgical History:   Procedure Laterality Date    CARDIAC SURGERY      4 vessel cabg Dr. Prakash Arroyo y    COLONOSCOPY  2/16/07    Dr. Antwon Lopez 2/2012    COLONOSCOPY  5.23.13    Carle Moritz MD polyp repeat 2018    COLONOSCOPY  06/18/2018    Dr. Luis Enrique Mabry polyp removed no repeat necessary   [address]  2003    Dr. Hassan Running Antibiotics        Social History:  Reviewed. reports that he quit smoking about 19 years ago. His smoking use included cigarettes. He has a 75.00 pack-year smoking history. He has quit using smokeless tobacco. He reports current alcohol use. He reports that he does not use drugs. Family History:  Reviewed. family history includes Coronary Art Dis in his father. Review of System:    · Constitutional: No fevers, chills. · Eyes: No visual changes or diplopia. No scleral icterus. · ENT: No Headaches. No mouth sores or sore throat. · Cardiovascular: Yes for chest pain, Yes for dyspnea on exertion, No for palpitations or No for loss of consciousness. No cough, hemoptysis, No for pleuritic pain, or phlebitis. · Respiratory: No for cough or wheezing. No hematemesis. · Gastrointestinal: No abdominal pain, blood in stools. · Musculoskeletal: No gait disturbance,    · Integumentary: No rash or pruritis. · Neurological: No headache, change in muscle strength, numbness or tingling. · Psychiatric: No anxiety, or depression.     Physical Examination:  Vitals:    03/18/22 0803   BP: (!) 141/71   Pulse: 67   Resp: 20   Temp: 98 °F (36.7 °C)   SpO2: 93%      In: 120 [P.O.:120]  Out: 3900    Wt Readings from Last 3 Encounters:   22 197 lb 1.5 oz (89.4 kg)   22 209 lb 12.8 oz (95.2 kg)   21 209 lb (94.8 kg)     Temp  Av.1 °F (36.7 °C)  Min: 97.7 °F (36.5 °C)  Max: 98.4 °F (36.9 °C)  Pulse  Av.8  Min: 50  Max: 72  BP  Min: 126/62  Max: 191/72  SpO2  Av.3 %  Min: 93 %  Max: 97 %    Intake/Output Summary (Last 24 hours) at 3/18/2022 0957  Last data filed at 3/18/2022 0437  Gross per 24 hour   Intake 120 ml   Output 3900 ml   Net -3780 ml       · Constitutional: Oriented. No distress. · Head: Normocephalic and atraumatic. · Mouth/Throat: Oropharynx is clear and moist.   · Eyes: Conjunctivae clear without jaunduice. PERRL. · Neck: Neck supple. No rigidity. No JVD present. · Cardiovascular: Normal rate, regular rhythm, S1&S2. · Pulmonary/Chest: Bilateral respiratory sounds. No wheezes, No rhonchi. · Abdominal: Soft. Bowel sounds present. No distension, No tenderness. · Musculoskeletal: No tenderness. No edema    · Lymphadenopathy: Has no cervical adenopathy. · Neurological: Alert and oriented. Cranial nerve appears intact, No Gross deficit   · Skin: Skin is warm and dry. No rash noted. · Psychiatric: Has a normal mood, affect and behavior     Labs:  Reviewed.    Recent Labs     22  0824 22  1524 22  0442    141 139   K 4.6 4.4 4.1    103 102   CO2 23 24 25   PHOS 2.7  --   --    BUN 15 14 19   CREATININE 1.1 1.2 1.4*     Recent Labs     22  0824 22  0443   WBC 9.3 8.7   HGB 12.5* 12.4*   HCT 39.3* 38.0*   MCV 81.8 81.3    196     Lab Results   Component Value Date    CKTOTAL 47 2012    CKMB 1.5 2012    TROPONINI <0.01 2022     No results found for: BNP  No results found for: PROTIME, INR  Lab Results   Component Value Date    CHOL 124 2022    HDL 45 2022    HDL 40 2012 TRIG 69 02/24/2022       Telemetry: Personally reviewed: AF, rates down to 32 while sleeping, 60-70 while awake    Radiography: Personally reviewed: 3/17/22     Mild pulmonary edema with peribronchial cuffing. Borderline cardiomegaly status post sternotomy. ECG: Personally reviewed: AF, HR 74, QRS 86, QTc 446    ECHO:  3/17/22   Summary   Normal left ventricle size. There is mild to moderate concentric left   ventricular hypertrophy. Overall left ventricular systolic function appears   normal 55-60%. No regional wall motion abnormalities are noted. Indeterminate diastolic function. Mild to moderate mitral regurgitation is present. Mild to moderate tricuspid regurgitation. Estimated pulmonary artery systolic pressure is 50 mmHg assuming a right   atrial pressure of 8 mmHg. Inferior vena cava appears dilated. IVC respiratory change in dimension < 50%. Stress Test: 6/2019       Conclusions        Summary    Overall findings represent a low risk scan. Normal LV size and systolic function. Small fixed defect involving the apex, otherwise no evidence of ischemia. ECG: Non-diagnostic EKG response due to baseline abnormalities.      Cardiac Angiography: n/a    Problem List:   Patient Active Problem List    Diagnosis Date Noted    Acute pulmonary edema ([address] 03/17/2022    Heart failure ([address] 03/17/2022    Atrial fibrillation (HCC)     Acute urinary retention     Atherosclerosis of native coronary artery of native heart with other form of angina pectoris ([address] 02/23/2022    Age-related cataract of right eye 10/11/2019    Vertigo 04/08/2019    Multiple atypical skin moles 01/04/2019    Other constipation 05/25/2018    Hyperlipidemia 09/03/2014    Chest pain 07/06/2012    Hx of colonic polyp 11/30/2011    Essential hypertension 09/07/2011    Hyperglycemia 06/01/2011    Gout     Coronary atherosclerosis     Benign non-nodular prostatic hyperplasia with lower urinary tract

## 2022-03-18 NOTE — CARE COORDINATION
Case Management Assessment           Initial Evaluation                Date / Time of Evaluation: 3/18/2022 12:43 PM                 Assessment Completed by: Abigail Castano RN    Patient Name: Jose Fischer     YOB: 1940  Diagnosis: Acute pulmonary edema ([facility] [J81.0]  Heart failure ([address] [I50.9]  Pulmonary edema, acute ([address] [J81.0]  Atrial fibrillation, unspecified type ([facility] [I48.91]  Acute congestive heart failure, unspecified heart failure type [facility]) [I50.9]     Date / Time: 3/17/2022  7:58 AM    Patient Admission Status: Inpatient    If patient is discharged prior to next notation, then this note serves as note for discharge by case management. Current PCP: Jannie Toure MD  Clinic Patient: No    Chart Reviewed: Yes  Patient/ Family Interviewed: Yes    Initial assessment completed at bedside with: patient and RN    Hospitalization in the last 30 days: No    Emergency Contacts:  Extended Emergency Contact Information  Primary Emergency Contact: Mirtajian Abdoul  Address: [address] [place] [address] Phone: [phone]  Mobile Phone: [phone]  Relation: Child    Advance Directives:   Code Status: Full Code        Financial  Payor: Gilford Fetter / Plan: Gilford Fetter / Product Type: *No Product type* /     Pre-cert required for SNF: Yes    [address] [facility]  [address] [place] [address]  Phone: [phone] Fax: [phone]      Potential assistance Purchasing Medications: Potential Assistance Purchasing Medications: No  Does Patient want to participate in local refill/ meds to beds program?:      Meds To Beds General Rules:  1. Can ONLY be done Monday- Friday between 8:30am-5pm  2. Prescription(s) must be in pharmacy by 3pm to be filled same day  3. Copy of patient's insurance/ prescription drug card and patient face sheet must be sent along with the prescription(s)  4. Cost of Rx cannot be added to hospital bill. If financial assistance is needed, please contact unit  or ;  or  CANNOT provide pharmacy voucher for patients co-pays  5. Patients can then  the prescription on their way out of the hospital at discharge, or pharmacy can deliver to the bedside if staff is available. (payment due at time of pick-up or delivery - cash, check, or card accepted)     Able to afford home medications/ co-pay costs: Yes    ADLS  Support Systems: Spouse/Significant Other,Children    PT AM-PAC:   /24  OT AM-PAC:   /24    [place]: apartment  Steps: elevator    Plans to RETURN to current housing: Yes  Barriers to RETURNING to current housing: none    Rafa Beasley  Currently ACTIVE with [address]: Yes  [address]:   Phone: [phone]  Fax: [phone]          Durable Medical Equipment  DME Provider: n/a  Equipment: n/a    Home Oxygen and [address] prior to admission: No  [address]: Not Applicable        DISCHARGE PLAN:  Disposition: Home with [address]: [address] for discharge: family     Factors facilitating achievement of predicted outcomes: Family support    Barriers to discharge: Medical complications    Additional Case Management Notes:  Patient is from home with spouse, has family support. He is [place] speaking, but can speak some [place]. Patient is active with [facility] [address] [place] [address]. CM left vm for [facility] to check services, as pt unsure about services. Pt up ad elisabeth in the room. Patient's son or daughter in law to transport.     The Plan for Transition of Care is related to the following treatment goals of Acute pulmonary edema (HCC) [J81.0]  Heart failure ([address] [I50.9]  Pulmonary edema, acute ([address] [J81.0]  Atrial fibrillation, unspecified type ([address] [I48.91]  Acute congestive heart failure, unspecified heart failure type ([facility] [I50.9]    The Patient and/or patient representative Harjeet and his family were provided with a choice of provider and agrees with the discharge plan Yes    Freedom of choice list was provided with basic dialogue that supports the patient's individualized plan of care/goals and shares the quality data associated with the providers.  Yes    Care Transition patient: Yes    Hoda Villagran RN  [facility]  Case Management Department  Ph: [phone]   Fax: [phone]

## 2022-03-18 NOTE — CONSULTS
Nutrition Note      Received consult for HF diet education. RD provided verbal and written dietary education on heart healthy dietary guidelines. Pt states his wife does most of the cooking at home and he does not usually follow a low sodium diet at home. RD discussed reduced sodium diet tips, cooking tips, and what foods to avoid on heart healthy diet. Provided RD contact information for any future dietary questions or diet review. Instructed the Patient on:   [x] Low Sodium foods  [x] Sodium free  [] Fluids   [] Other     Educational Materials Provided:   [] [facility] Failure Education folder- Nutrition Therapy   [x] Nutrition Care Manual Heart Failure Nutrition Therapy   [x] Other: NCM Heart Healthy Reduced Sodium Therapy    Time Spent with Patient (minutes): 15 min    The patient will still be monitored per nutrition standards of care. Consult dietitian if nutrition interventions essential to patient care is needed.      Pretty Roman, [address] [place] [address]:  [phone]  Office:  [phone]

## 2022-03-18 NOTE — PROGRESS NOTES
[address] Daily Progress Note      Admit Date:  3/17/2022    Subjective:  Mr. Reilly Londono was seen and examined. F/U CHF/CAD/CABG/afib. No complaints. Breathing much better. No chest pain.      Objective:   /65   Pulse 66   Temp 98.2 °F (36.8 °C) (Oral)   Resp 18   Ht 5' 8\" (1.727 m)   Wt 197 lb 1.5 oz (89.4 kg)   SpO2 94%   BMI 29.97 kg/m²     Intake/Output Summary (Last 24 hours) at 3/18/2022 1533  Last data filed at 3/18/2022 1400  Gross per 24 hour   Intake 650 ml   Output 3150 ml   Net -2500 ml       TELEMETRY: personally reviewed, Atrial fibrillation     Physical Exam:  General:  Awake, alert, NAD  Skin:  Warm and dry  Neck:  JVP difficult  Chest: decreased BS, respiration normal  Cardiovascular:  Irreg/irreg S1S2  Abdomen:  Soft nontender  Extremities:  Tr edema    Medications:    furosemide  40 mg IntraVENous Daily    [START ON 3/19/2022] aspirin  81 mg Oral Daily    apixaban  5 mg Oral BID    amLODIPine  2.5 mg Oral Daily    atorvastatin  40 mg Oral Daily    finasteride  5 mg Oral Daily    isosorbide mononitrate  60 mg Oral BID    tamsulosin  0.4 mg Oral Daily    sodium chloride flush  5-40 mL IntraVENous 2 times per day    famotidine  20 mg Oral BID    allopurinol  300 mg Oral Daily      sodium chloride       nitroGLYCERIN, HYDROcodone-acetaminophen, sodium chloride flush, sodium chloride, ondansetron **OR** ondansetron, polyethylene glycol, acetaminophen **OR** acetaminophen, perflutren lipid microspheres, zolpidem, nitroglycerin    Lab Data:  CBC:   Recent Labs     03/17/22  0824 03/18/22  0443   WBC 9.3 8.7   HGB 12.5* 12.4*   HCT 39.3* 38.0*   MCV 81.8 81.3    196     BMP:   Recent Labs     03/17/22  0824 03/17/22  1524 03/18/22  0442    141 139   K 4.6 4.4 4.1    103 102   CO2 23 24 25   PHOS 2.7  --   --    BUN 15 14 19   CREATININE 1.1 1.2 1.4*     LIVER PROFILE: No results for input(s): AST, ALT, LIPASE, BILIDIR, BILITOT, ALKPHOS in the last 72 hours. Invalid input(s): AMYLASE,  ALB  PT/INR: No results for input(s): PROTIME, INR in the last 72 hours. APTT: No results for input(s): APTT in the last 72 hours. BNP:  No results for input(s): BNP in the last 72 hours. IMAGING:     Assessment:  Patient Active Problem List    Diagnosis Date Noted    Other constipation 05/25/2018    Hyperlipidemia 09/03/2014    Chest pain 07/06/2012    Hx of colonic polyp 11/30/2011    Essential hypertension 09/07/2011    Hyperglycemia 06/01/2011    Gout     Coronary atherosclerosis     Benign non-nodular prostatic hyperplasia with lower urinary tract symptoms     Pain in limb     Bradycardia 03/18/2022    Acute pulmonary edema ([facility] 03/17/2022    Heart failure ([facility] 03/17/2022    Atrial fibrillation (HCC)     Acute urinary retention     Atherosclerosis of native coronary artery of native heart with other form of angina pectoris ([facility] 02/23/2022    Age-related cataract of right eye 10/11/2019    Vertigo 04/08/2019    Multiple atypical skin moles 01/04/2019       Plan:  1. Good diuresis overnight. Breathing much better. Edema better. Significant bradycardia overnight. Will have EP see. On AC with eliquis. Cr up. Will hold IV lasix. To oral tomorrow if cr improved.           Core Measures:  · Discharge instructions:   · LVEF documented:   · ACEI for LV dysfunction:   · Smoking Cessation:    Francisco Hobson MD, MD 3/18/2022 3:33 PM

## 2022-03-18 NOTE — PLAN OF CARE
Problem: Falls - Risk of:  Goal: Will remain free from falls  Description: Will remain free from falls  Outcome: Ongoing  Note: Pt free from injury or falls at this time, fall precautions in place, bed in low position, side rail up x2, [place] Fall Risk: Medium (25-44), reoriented to room and call light, reminded not to get up without assistance, call light in reach, will continue to monitor. Pt verbalizes understanding of fall risk procedures. Problem: Pain:  Goal: Pain level will decrease  Description: Pain level will decrease  Outcome: Ongoing  Note: Denies pain at this time, vitals stable, assisted to position of comfort. Problem: OXYGENATION/RESPIRATORY FUNCTION  Goal: Patient will maintain patent airway  Outcome: Ongoing  Note: Pt's sats remain above 90 on room air.  Will continue to assess

## 2022-03-18 NOTE — CONSULTS
Consulted for rash of trunk, per pt he has had the rash for over 30 years, pt is not complaining of itching at this time, no open areas appreciated, Wound Care to sign off, please re-consult for changes or deterioration.

## 2022-03-19 VITALS
WEIGHT: 196.5 LBS | TEMPERATURE: 98.4 F | SYSTOLIC BLOOD PRESSURE: 141 MMHG | HEART RATE: 64 BPM | HEIGHT: 68 IN | OXYGEN SATURATION: 94 % | RESPIRATION RATE: 16 BRPM | DIASTOLIC BLOOD PRESSURE: 76 MMHG | BODY MASS INDEX: 29.78 KG/M2

## 2022-03-19 LAB
ANION GAP SERPL CALCULATED.3IONS-SCNC: 12 MMOL/L (ref 3–16)
BASOPHILS ABSOLUTE: 0 K/UL (ref 0–0.2)
BASOPHILS RELATIVE PERCENT: 0.3 %
BUN BLDV-MCNC: 24 MG/DL (ref 7–20)
CALCIUM SERPL-MCNC: 9.7 MG/DL (ref 8.3–10.6)
CHLORIDE BLD-SCNC: 101 MMOL/L (ref 99–110)
CO2: 27 MMOL/L (ref 21–32)
CREAT SERPL-MCNC: 1.4 MG/DL (ref 0.8–1.3)
EOSINOPHILS ABSOLUTE: 0.3 K/UL (ref 0–0.6)
EOSINOPHILS RELATIVE PERCENT: 3.4 %
GFR AFRICAN AMERICAN: 59
GFR NON-AFRICAN AMERICAN: 49
GLUCOSE BLD-MCNC: 104 MG/DL (ref 70–99)
HCT VFR BLD CALC: 38.6 % (ref 40.5–52.5)
HEMOGLOBIN: 12.6 G/DL (ref 13.5–17.5)
LYMPHOCYTES ABSOLUTE: 1.2 K/UL (ref 1–5.1)
LYMPHOCYTES RELATIVE PERCENT: 14.5 %
MAGNESIUM: 1.9 MG/DL (ref 1.8–2.4)
MCH RBC QN AUTO: 26.4 PG (ref 26–34)
MCHC RBC AUTO-ENTMCNC: 32.6 G/DL (ref 31–36)
MCV RBC AUTO: 81 FL (ref 80–100)
MONOCYTES ABSOLUTE: 1.1 K/UL (ref 0–1.3)
MONOCYTES RELATIVE PERCENT: 13.2 %
NEUTROPHILS ABSOLUTE: 5.8 K/UL (ref 1.7–7.7)
NEUTROPHILS RELATIVE PERCENT: 68.6 %
PDW BLD-RTO: 16.8 % (ref 12.4–15.4)
PLATELET # BLD: 198 K/UL (ref 135–450)
PMV BLD AUTO: 8.3 FL (ref 5–10.5)
POTASSIUM SERPL-SCNC: 4 MMOL/L (ref 3.5–5.1)
RBC # BLD: 4.76 M/UL (ref 4.2–5.9)
SODIUM BLD-SCNC: 140 MMOL/L (ref 136–145)
WBC # BLD: 8.5 K/UL (ref 4–11)

## 2022-03-19 PROCEDURE — 99232 SBSQ HOSP IP/OBS MODERATE 35: CPT | Performed by: INTERNAL MEDICINE

## 2022-03-19 PROCEDURE — G0378 HOSPITAL OBSERVATION PER HR: HCPCS

## 2022-03-19 PROCEDURE — 85025 COMPLETE CBC W/AUTO DIFF WBC: CPT

## 2022-03-19 PROCEDURE — 6370000000 HC RX 637 (ALT 250 FOR IP): Performed by: INTERNAL MEDICINE

## 2022-03-19 PROCEDURE — 6370000000 HC RX 637 (ALT 250 FOR IP): Performed by: STUDENT IN AN ORGANIZED HEALTH CARE EDUCATION/TRAINING PROGRAM

## 2022-03-19 PROCEDURE — 99238 HOSP IP/OBS DSCHRG MGMT 30/<: CPT | Performed by: HOSPITALIST

## 2022-03-19 PROCEDURE — 2580000003 HC RX 258: Performed by: STUDENT IN AN ORGANIZED HEALTH CARE EDUCATION/TRAINING PROGRAM

## 2022-03-19 PROCEDURE — 80048 BASIC METABOLIC PNL TOTAL CA: CPT

## 2022-03-19 PROCEDURE — 83735 ASSAY OF MAGNESIUM: CPT

## 2022-03-19 PROCEDURE — 36415 COLL VENOUS BLD VENIPUNCTURE: CPT

## 2022-03-19 RX ORDER — ASPIRIN 81 MG/1
81 TABLET ORAL DAILY
Qty: 30 TABLET | Refills: 3 | Status: SHIPPED | OUTPATIENT
Start: 2022-03-20 | End: 2022-03-28

## 2022-03-19 RX ORDER — FAMOTIDINE 20 MG/1
20 TABLET, FILM COATED ORAL 2 TIMES DAILY
Qty: 60 TABLET | Refills: 3 | Status: SHIPPED | OUTPATIENT
Start: 2022-03-19

## 2022-03-19 RX ORDER — POLYETHYLENE GLYCOL 3350 17 G/17G
17 POWDER, FOR SOLUTION ORAL DAILY PRN
Qty: 527 G | Refills: 1 | Status: SHIPPED | OUTPATIENT
Start: 2022-03-19 | End: 2022-04-18

## 2022-03-19 RX ORDER — FUROSEMIDE 40 MG/1
40 TABLET ORAL DAILY
Status: DISCONTINUED | OUTPATIENT
Start: 2022-03-19 | End: 2022-03-19 | Stop reason: HOSPADM

## 2022-03-19 RX ORDER — FUROSEMIDE 40 MG/1
40 TABLET ORAL DAILY
Qty: 60 TABLET | Refills: 3 | Status: SHIPPED | OUTPATIENT
Start: 2022-03-19 | End: 2022-10-31 | Stop reason: SDUPTHER

## 2022-03-19 RX ADMIN — AMLODIPINE BESYLATE 2.5 MG: 5 TABLET ORAL at 08:15

## 2022-03-19 RX ADMIN — SODIUM CHLORIDE, PRESERVATIVE FREE 10 ML: 5 INJECTION INTRAVENOUS at 08:28

## 2022-03-19 RX ADMIN — FUROSEMIDE 40 MG: 40 TABLET ORAL at 09:46

## 2022-03-19 RX ADMIN — ATORVASTATIN CALCIUM 40 MG: 40 TABLET, FILM COATED ORAL at 08:15

## 2022-03-19 RX ADMIN — FAMOTIDINE 20 MG: 20 TABLET ORAL at 08:15

## 2022-03-19 RX ADMIN — APIXABAN 5 MG: 5 TABLET, FILM COATED ORAL at 08:15

## 2022-03-19 RX ADMIN — TAMSULOSIN HYDROCHLORIDE 0.4 MG: 0.4 CAPSULE ORAL at 08:15

## 2022-03-19 RX ADMIN — ALLOPURINOL 300 MG: 300 TABLET ORAL at 08:15

## 2022-03-19 RX ADMIN — ASPIRIN 81 MG: 81 TABLET, COATED ORAL at 08:15

## 2022-03-19 RX ADMIN — FINASTERIDE 5 MG: 5 TABLET, FILM COATED ORAL at 08:15

## 2022-03-19 RX ADMIN — ISOSORBIDE MONONITRATE 60 MG: 60 TABLET, EXTENDED RELEASE ORAL at 08:15

## 2022-03-19 ASSESSMENT — PAIN SCALES - GENERAL
PAINLEVEL_OUTOF10: 0
PAINLEVEL_OUTOF10: 0

## 2022-03-19 NOTE — CARE COORDINATION
Case Management Assessment            Discharge Note                    Date / Time of Note: 3/19/2022 10:44 AM                  Discharge Note Completed by: Parmjit Marin RN    Patient Name: Avelina Gilbert   YOB: 1940  Diagnosis: Acute pulmonary edema ([address] [J81.0]  Heart failure ([address] [I50.9]  Pulmonary edema, acute ([address] [J81.0]  Atrial fibrillation, unspecified type ([address] [I48.91]  Acute congestive heart failure, unspecified heart failure type ([address] [I50.9]   Date / Time: 3/17/2022  7:58 AM    Current PCP: Melanie Hernandez MD  Clinic patient: No    Hospitalization in the last 30 days: No    Advance Directives:  Code Status: Full Code  [place] DNR form completed and on chart: Not Indicated    Financial:  Payor: Andreea Fernandes / Plan: Andreea Fernandes / Product Type: *No Product type* /      Pharmacy:    [address] [place] [address]  Phone: [phone] Fax: [phone]      Assistance purchasing medications?: Potential Assistance Purchasing Medications: No  Assistance provided by Case Management: None at this time    Does patient want to participate in local refill/ meds to beds program?:      Meds To Beds General Rules:  1. Can ONLY be done Monday- Friday between 8:30am-5pm  2. Prescription(s) must be in pharmacy by 3pm to be filled same day  3. Copy of patient's insurance/ prescription drug card and patient face sheet must be sent along with the prescription(s)  4. Cost of Rx cannot be added to hospital bill. If financial assistance is needed, please contact unit  or ;  or  CANNOT provide pharmacy voucher for patients co-pays  5.  Patients can then  the prescription on their way out of the hospital at discharge, or pharmacy can deliver to the bedside if staff is available. (payment due at time of pick-up or delivery - cash, check, or card accepted)     Able to afford home medications/ co-pay costs: Yes    ADLS:  Current PT AM-PAC Score:   /24  Current OT AM-PAC Score:   /24      DISCHARGE Disposition: Home with [address]: [address] to resume     LOC at discharge: Not Applicable  LIANA Completed: Not Indicated    Notification completed in HENS/PAS?:  Not Applicable      Transportation:  Transportation PLAN for discharge: family   Mode of Transport: [address] ordered at discharge: Yes  [address]: [place]  Phone: [phone]  Fax: [phone]  Orders faxed: Yes--resume orders faxed to [address]  [phone], called them and no answer for intake    Durable Medical Equipment:  DME Provider: none    Referrals made at [facility] for outpatient continued care:  Not Applicable    Additional CM Notes: Patient to transport home with family today and will resume home care orders. The Plan for Transition of Care is related to the following treatment goals of Acute pulmonary edema (HCC) [J81.0]  Heart failure ([address] [I50.9]  Pulmonary edema, acute ([address] [J81.0]  Atrial fibrillation, unspecified type ([address] [I48.91]  Acute congestive heart failure, unspecified heart failure type ([address] [I50.9]    The Patient and/or patient representative Harjeet and his family were provided with a choice of provider and agrees with the discharge plan Yes    Freedom of choice list was provided with basic dialogue that supports the patient's individualized plan of care/goals and shares the quality data associated with the providers.  Yes    Care Transitions patient: No    Hanna Morales RN  [facility]  Case Management Department  Ph: [phone]

## 2022-03-19 NOTE — DISCHARGE SUMMARY
INTERNAL MEDICINE DEPARTMENT AT [address] [place] [address]  DISCHARGE SUMMARY    Patient ID: Rell Dave                                             Discharge Date: 3/19/2022   Patient's PCP: Lynda Sandy MD                                          Discharge Physician: Stu Burrell MD MD  Admit Date: 3/17/2022   Admitting Physician: Gill Rodriguez MD    PROBLEMS DURING HOSPITALIZATION:  Present on Admission:   Acute pulmonary edema ([address]   Acute congestive heart failure ([address]   Coronary atherosclerosis   Atrial fibrillation ([address]   Essential hypertension   Acute urinary retention   Bradycardia   Acute on chronic diastolic heart failure ([address]      DISCHARGE DIAGNOSES:  Acute exacerbation of diastolic heart failure - diuresed and became euvolemic  Hypertension  Hyperlipidemia  Atrial fibrillation, rate controlled  Coronary artery disease, stable    The following issues were addressed during hospitalization:  Rell Dave is an [de-identified] yoM with a PmHx of HTN, HLD, Afib, CAD, gout who presented with SOB and chest tightness. Initial ekg negative for ischemic changes and troponin negative. Patient had symptoms and signs of volume overload, he was diuresed with iv lasix and he had significant symptomatic improvement. He had an echo done, results of which are noted below. Cardiology was consulted and patient was given instructions to follow up with Yosef Lynn on discharge. Patient was also found to be bradycardic and in a fib on this admission, hr remained in the 50's. He was seen by electrophysiology who recommended a 14 day holter monitor, which patient was given on discharge. He will also be started on eliquis 5 mg bid on discharge. He was asked to monitor for symptoms and signs of bleeding.   On the day of discharge, patient denied any CP, SOB, palpitations, N/V, abdominal pain or urinary symptoms. Patient status improved and patient was stable on the day of discharge.  Denies melena, hematochezia, 2022  Replaces: aspirin 325 MG tablet     famotidine 20 MG tablet  Commonly known as: PEPCID  Take 1 tablet by mouth 2 times daily     furosemide 40 MG tablet  Commonly known as: LASIX  Take 1 tablet by mouth daily     polyethylene glycol 17 g packet  Commonly known as: GLYCOLAX  Take 17 g by mouth daily as needed for Constipation        CONTINUE taking these medications    allopurinol 300 MG tablet  Commonly known as: ZYLOPRIM  TAKE ONE TABLET BY MOUTH DAILY     amLODIPine 2.5 MG tablet  Commonly known as: NORVASC  Take 1 tablet by mouth daily     atorvastatin 40 MG tablet  Commonly known as: LIPITOR  Take 1 tablet by mouth daily     colchicine 0.6 MG tablet  Commonly known as: Colcrys  TAKE ONE TABLET BY MOUTH DAILY     finasteride 5 MG tablet  Commonly known as: PROSCAR     HYDROcodone-acetaminophen 5-325 MG per tablet  Commonly known as: Norco  Take 1 tablet by mouth every 6 hours as needed for Pain for up to 30 days. isosorbide mononitrate 60 MG extended release tablet  Commonly known as: IMDUR     tamsulosin 0.4 MG capsule  Commonly known as: FLOMAX     vitamin D 1000 UNIT Tabs tablet  Commonly known as: CHOLECALCIFEROL     zolpidem 10 MG tablet  Commonly known as: AMBIEN  Take 1 tablet by mouth nightly as needed for Sleep for up to 30 days.         STOP taking these medications    aspirin 325 MG tablet  Replaced by: aspirin 81 MG EC tablet     hydroCHLOROthiazide 25 MG tablet  Commonly known as: HYDRODIURIL           Where to Get Your Medications      You can get these medications from any pharmacy    Bring a paper prescription for each of these medications  · apixaban 5 MG Tabs tablet  · aspirin 81 MG EC tablet  · famotidine 20 MG tablet  · furosemide 40 MG tablet  · polyethylene glycol 17 g packet       Activity: activity as tolerated  Diet: cardiac diet  Wound Care: as directed    Time Spent on discharge is more than 45 minutes    Signed:  Mirian Mckeon MD, PGY-3  3/19/2022     Addendum to Resident H& P/Progress note:  I have personally seen,examined and evaluated the patient.  I have reviewed the current history, physical findings, labs and assessment and plan and agree with note as documented by resident MD ( Kourtney Couch)      Marc Nascimento MD, [address]

## 2022-03-19 NOTE — PROGRESS NOTES
Discharge instructions reviewed with pt, discussed medications, denies any further questions or anxiety related to discharge. IV/tele discontinued. Pt discharged to home. Taken from room by PCA and family member, personal belongings taken with.

## 2022-03-19 NOTE — PROGRESS NOTES
[facility]   Cardiac Electrophysiology Progress Note     Admit Date: 3/17/2022     Reason for follow up: A. fib with slow ventricular rate    HPI and Interval History:   Patient seen and examined. Clinical notes reviewed. Telemetry reviewed. No new complaint today. No major events overnight. Denies having chest pain, shortness of breath, dyspnea on exertion, Orthopnea, PND at the time of this visit. Review of System:  All other systems reviewed except for that noted above. Pertinent negatives and positives are:     · General: negative for fever, chills   · Ophthalmic ROS: negative for - eye pain or loss of vision  · ENT ROS: negative for - headaches, sore throat   · Respiratory: negative for - cough, sputum  · Cardiovascular: Reviewed in HPI  · Gastrointestinal: negative for - abdominal pain, diarrhea, N/V  · Hematology: negative for - bleeding, blood clots, bruising or jaundice  · Genito-Urinary:  negative for - Dysuria or incontinence  · Musculoskeletal: negative for - Joint swelling, muscle pain  · Neurological: negative for - confusion, dizziness, headaches   · Psychiatric: No anxiety, no depression. · Dermatological: negative for - rash      Physical Examination:  Vitals:    22 0811   BP: (!) 141/76   Pulse: 64   Resp: 16   Temp: 98.4 °F (36.9 °C)   SpO2: 94%        Intake/Output Summary (Last 24 hours) at 3/19/2022 0951  Last data filed at 3/19/2022 0437  Gross per 24 hour   Intake 650 ml   Output 1050 ml   Net -400 ml     In: 650 [P.O.:650]  Out: 1300    Wt Readings from Last 3 Encounters:   22 196 lb 8 oz (89.1 kg)   22 209 lb 12.8 oz (95.2 kg)   21 209 lb (94.8 kg)     Temp  Av.1 °F (36.7 °C)  Min: 97.8 °F (36.6 °C)  Max: 98.4 °F (36.9 °C)  Pulse  Av  Min: 52  Max: 66  BP  Min: 117/69  Max: 159/66  SpO2  Av.8 %  Min: 93 %  Max: 95 %    · Telemetry: A. fib with slow ventricular rate  · Constitutional: Alert. Oriented to person, place, and time.  No distress. · Head: Normocephalic and atraumatic. · Mouth/Throat: Lips appear moist. Oropharynx is clear and moist.  · Eyes: Conjunctivae normal. EOM are normal.   · Neck: Neck supple. No lymphadenopathy. No rigidity. No JVD present. · Cardiovascular: Normal rate, irregular rhythm. Normal S1&S2. Carotid pulse 2+ bilaterally. · Pulmonary/Chest: Bilateral respiratory sounds present. No respiratory accessory muscle use. No wheezes, No rhonchi. · Abdominal: Soft. Normal bowel sounds present. No distension, No tenderness. No splenomegaly. No hernia. · Musculoskeletal: No tenderness. No edema    · Lymphadenopathy: Has no cervical adenopathy. · Neurological: Alert and oriented. Cranial nerve II-XII grossly intact, No gross deficit to touch. · Skin: Skin is warm and dry. No rash, lesions, ulcerations noted. · Psychiatric: No anxiety nor agitation. Labs, diagnostic and imaging results reviewed. Reviewed. Recent Labs     03/17/22  0824 03/17/22  1524 03/18/22  0442 03/18/22  1947 03/19/22  0503      < > 139 141 140   K 4.6   < > 4.1 4.6 4.0      < > 102 98* 101   CO2 23   < > 25 27 27   PHOS 2.7  --   --   --   --    BUN 15   < > 19 23* 24*   CREATININE 1.1   < > 1.4* 1.6* 1.4*    < > = values in this interval not displayed. Recent Labs     03/17/22  0824 03/18/22  0443 03/19/22  0503   WBC 9.3 8.7 8.5   HGB 12.5* 12.4* 12.6*   HCT 39.3* 38.0* 38.6*   MCV 81.8 81.3 81.0    196 198     Lab Results   Component Value Date    CKTOTAL 47 07/06/2012    CKMB 1.5 07/06/2012    TROPONINI <0.01 03/17/2022     Estimated Creatinine Clearance: 46 mL/min (A) (based on SCr of 1.4 mg/dL (H)).    No results found for: BNP  No results found for: PROTIME, INR  Lab Results   Component Value Date    CHOL 136 03/18/2022    HDL 37 03/18/2022    HDL 40 03/27/2012    TRIG 83 03/18/2022       Scheduled Meds:   furosemide  40 mg Oral Daily    aspirin  81 mg Oral Daily    apixaban  5 mg Oral BID    amLODIPine  2.5 mg Oral Daily    atorvastatin  40 mg Oral Daily    finasteride  5 mg Oral Daily    isosorbide mononitrate  60 mg Oral BID    tamsulosin  0.4 mg Oral Daily    sodium chloride flush  5-40 mL IntraVENous 2 times per day    famotidine  20 mg Oral BID    allopurinol  300 mg Oral Daily     Continuous Infusions:   sodium chloride       PRN Meds:nitroGLYCERIN, HYDROcodone-acetaminophen, sodium chloride flush, sodium chloride, ondansetron **OR** ondansetron, polyethylene glycol, acetaminophen **OR** acetaminophen, perflutren lipid microspheres, zolpidem, nitroglycerin     Patient Active Problem List    Diagnosis Date Noted    Bradycardia 03/18/2022    Acute on chronic diastolic heart failure (HCC)     Acute pulmonary edema ([address] 03/17/2022    Acute congestive heart failure ([address] 03/17/2022    Atrial fibrillation ([address]     Acute urinary retention     Atherosclerosis of native coronary artery of native heart with other form of angina pectoris ([address] 02/23/2022    Age-related cataract of right eye 10/11/2019    Vertigo 04/08/2019    Multiple atypical skin moles 01/04/2019    Other constipation 05/25/2018    Hyperlipidemia 09/03/2014    Chest pain 07/06/2012    Hx of colonic polyp 11/30/2011    Essential hypertension 09/07/2011    Hyperglycemia 06/01/2011    Gout     Coronary atherosclerosis     Benign non-nodular prostatic hyperplasia with lower urinary tract symptoms     Pain in limb       Active Hospital Problems    Diagnosis Date Noted    Bradycardia [R00.1] 03/18/2022    Acute on chronic diastolic heart failure (HCC) [I50.33]     Acute pulmonary edema ([address] [J81.0] 03/17/2022    Acute congestive heart failure (HCC) [I50.9] 03/17/2022    Atrial fibrillation (HCC) [I48.91]     Acute urinary retention [R33.8]     Essential hypertension [I10] 09/07/2011    Coronary atherosclerosis [I25.10]        Assessment and Plan:     A. fib with slow ventricular rate -no long pauses; no episodes of tachycardia in the telemetry  No symptoms of presyncope/syncope  On Eliquis 5 mg p.o. twice daily  Stop aspirin 325 mg and transition to aspirin 81 mg daily  Cardiac monitor for 2 weeks at the time of discharge    Okay to discharge from EP standpoint  Follow-up with EP NP    Thank you for allowing me to participate in the care of this patient. If you have any questions, please do not hesitate to contact me. Paul Bejarano MD   Cardiac Electrophysiology  [facility]    For any EP related issues after 5 PM, contact Tavares  on call cardiology through .

## 2022-03-20 NOTE — DISCHARGE INSTR - COC
Continuity of Care Form    Patient Name: Pilar Mayers   :  1940  MRN:  [id number]    Admit date:  3/17/2022  Discharge date:  ***    Code Status Order: Prior   Advance Directives:      Admitting Physician:  Sari Galan MD  PCP: Marla Lima MD    Discharging Nurse: [facility] Unit/Room#: 9854/9595-77  Discharging Unit Phone Number: ***    Emergency Contact:   Extended Emergency Contact Information  Primary Emergency Contact: [facility]  Address: [address] [place] [address], [address] Phone: [phone]  Mobile Phone: [phone]  Relation: Child    Past Surgical History:  Past Surgical History:   Procedure Laterality Date    CARDIAC SURGERY      4 vessel cabg Dr. Cassie Lyle y    COLONOSCOPY  07    Dr. Emma Crowdre 2012    COLONOSCOPY  5.23.13    Garland Payne MD polyp repeat     COLONOSCOPY  2018    Dr. Brayden Jasso polyp removed no repeat necessary    CORONARY ARTERY BYPASS GRAFT      Dr. Dario Reveles       Immunization History:   Immunization History   Administered Date(s) Administered    COVID-19, Pfizer Purple top, DILUTE for use, 12+ yrs, 30mcg/0.3mL dose 2021, 2021, 2021    Pneumococcal Conjugate 13-valent (Lenetta Primrose) 2016    Td, unspecified formulation 2016       Active Problems:  Patient Active Problem List   Diagnosis Code    Gout M10.9    Coronary atherosclerosis I25.10    Benign non-nodular prostatic hyperplasia with lower urinary tract symptoms N40.1    Pain in limb M79.609    Hyperglycemia R73.9    Essential hypertension I10    Hx of colonic polyp Z86.010    Chest pain R07.9    Hyperlipidemia E78.5    Other constipation K59.09    Multiple atypical skin moles D22.9    Vertigo R42    Age-related cataract of right eye H25.9    Atherosclerosis of native coronary artery of native heart with other form of angina pectoris ([address] I25.118    Acute pulmonary edema (HCC) J81.0    Acute congestive heart failure (HCC) I50.9    Atrial fibrillation ([facility]) I48.91    Acute urinary retention R33.8    Bradycardia R00.1    Acute on chronic diastolic heart failure ([facility]) I50.33       Isolation/Infection:   Isolation            No Isolation          Patient Infection Status       Infection Onset Added Last Indicated Last Indicated By Review Planned Expiration Resolved Resolved By    None active    Resolved    COVID-19 (Rule Out) 03/17/22 03/17/22 03/17/22 COVID-19 & Influenza Combo (Ordered)   03/17/22 Rule-Out Test Resulted    COVID-19 (Rule Out) 01/07/22 01/07/22 01/07/22 COVID-19 (Ordered)   01/08/22 Rule-Out Test Resulted            Nurse Assessment:  Last Vital Signs: BP (!) 141/76   Pulse 64   Temp 98.4 °F (36.9 °C) (Oral)   Resp 16   Ht 5' 8\" (1.727 m)   Wt 196 lb 8 oz (89.1 kg)   SpO2 94%   BMI 29.88 kg/m²     Last documented pain score (0-10 scale): Pain Level: 0  Last Weight:   Wt Readings from Last 1 Encounters:   03/19/22 196 lb 8 oz (89.1 kg)     Mental Status:  {IP PT MENTAL STATUS:20030}    IV Access:  { LIANA IV ACCESS:174801397}    Nursing Mobility/ADLs:  Walking   {P DME NAPJ:521871054}  Transfer  {P DME DXRL:051628890}  Bathing  {P DME PIJO:677967443}  Dressing  {P DME IQGJ:547430592}  Toileting  {CHP DME GMJJ:254305012}  Feeding  {P DME XEHT:387245388}  Med Admin  {P DME RXSL:032734317}  Med Delivery   { LIANA MED Delivery:464634887}    Wound Care Documentation and Therapy:        Elimination:  Continence: Bowel: {YES / XN:64248}  Bladder: {YES / JS:17474}  Urinary Catheter: {Urinary Catheter:650124709}   Colostomy/Ileostomy/Ileal Conduit: {YES / RQ:61050}       Date of Last BM: ***  No intake or output data in the 24 hours ending 03/20/22 1355  I/O last 3 completed shifts:   In: 120 [P.O.:120]  Out: 600 [Urine:600]    Safety Concerns:     [address] LIANA Safety Concerns:201412146}    Impairments/Disabilities:      [address] LIANA Impairments/Disabilities:424373915}    Nutrition Therapy:  Current Nutrition Therapy:   [address] LIANA Diet List:982172858}    Routes of Feeding: {CHP DME Other Feedings:904567933}  Liquids: {Slp liquid thickness:38133}  Daily Fluid Restriction: {CHP DME Yes amt example:970841864}  Last Modified Barium Swallow with Video (Video Swallowing Test): {Done Not Done KXJD:116903217}    Treatments at the Time of Hospital Discharge:   Respiratory Treatments: ***  Oxygen Therapy:  {Therapy; copd oxygen:67082}  Ventilator:    {MH CC Vent WHUP:008550458}      Heart Failure Instructions for Daily Management  Patient was treated for acute on chronic diastolic heart failure. he  will require the following:    Please weigh daily on the same scale and approximately the same time of day. Report weight gain of 3 pounds/day or 5 pounds/week to : Dhiraj Blanco [phone] and [facility] / Eddie Howard [phone]. Please use hospital discharge weight as baseline reference. Please monitor for signs and symptoms of and report to MD:  Worsening Heart Failure: sudden weight gain, shortness of breath, lower extremity or general edema/swelling, abdominal bloating/swelling, inability to lie flat, intolerance to usual activity, or cough (especially at night). Report these finding even if no increase in weight. Dehydration:  having difficulty or a decrease in urination, dizziness, worsening fatigue, or new onset/worsening of generalized weakness. Please continue a LOW SODIUM diet and LIMIT fluid intake to 48 - 64 ounces ( 1.5 - 2 liters) per day. Call Dhiraj Blanco [phone] and [facility] / Eddie Howard [phone] with any questions or concerns. Please continue heart failure education to patient and family/support system. See After Visit Summary for hospital follow up appointment details. Consider spiritual care referral for support and/or completion of advance directives .   Consider: [facility] telehealth program if patient agreeable and able to participate and palliative care consult for ongoing goals of care, end of life, and/or chronic disease management discussions. Patient's primary cardiologist is Dr Georgette Barriga. Rehab Therapies: {THERAPEUTIC INTERVENTION:4360560134}  Weight Bearing Status/Restrictions: {[facility] Weight Bearin}  Other Medical Equipment (for information only, NOT a DME order):  {EQUIPMENT:579925307}  Other Treatments: ***    Patient's personal belongings (please select all that are sent with patient):  {[facility] DME Belongings:730726004}    RN SIGNATURE:  {Esignature:130496956}    CASE MANAGEMENT/SOCIAL WORK SECTION    Inpatient Status Date: ***    Readmission Risk Assessment Score:  Readmission Risk              Risk of Unplanned Readmission:  0           Discharging to Facility/ Agency   Name:   Address:  Phone:  Fax:    Dialysis Facility (if applicable)   Name:  Address:  Dialysis Schedule:  Phone:  Fax:    / signature: {Esignature:416025413}    PHYSICIAN SECTION    Prognosis: {Prognosis:671941}    Condition at Discharge: [address] [place] [address] Patient Condition:243275516}    Rehab Potential (if transferring to Rehab): {Prognosis:5773298949}    Recommended Labs or Other Treatments After Discharge: ***    Physician Certification: I certify the above information and transfer of Rosanna Lynne  is necessary for the continuing treatment of the diagnosis listed and that he requires {Admit to Appropriate Level of Care:90226} for {GREATER/LESS:234491629} 30 days.      Update Admission H&P: {[facility] DME Changes in RUBLL:204022594}    PHYSICIAN SIGNATURE:  {Esignature:657725325}

## 2022-03-21 ENCOUNTER — CARE COORDINATION (OUTPATIENT)
Dept: CASE MANAGEMENT | Age: 82
End: 2022-03-21

## 2022-03-22 ENCOUNTER — CARE COORDINATION (OUTPATIENT)
Dept: CASE MANAGEMENT | Age: 82
End: 2022-03-22

## 2022-03-22 ENCOUNTER — TELEPHONE (OUTPATIENT)
Dept: INTERNAL MEDICINE CLINIC | Age: 82
End: 2022-03-22

## 2022-03-22 NOTE — CARE COORDINATION
[facility] [facility] Transitions Initial Follow Up Call    Call within 2 business days of discharge: Yes    Patient:  China Valera   Patient :  1940  MRN:  [id number]    Reason for Admission: Acute Pulmonary Edema  Discharge Date:  3/19/22   RARS:       Non-face-to-face services provided:  Communication with home health agencies or other community services the patient is currently using-[facility]. CTN confirmed with intake department, with [facility]  agency, MICHAEL orders were received. 2ND CTC attempt to reach Pt regarding recent hospital discharge. CTC left voice recording with call back number requesting a call back. CTN will close out CTN episode at this time.     Follow up appointments:    Future Appointments   Date Time Provider Nilam Rangel   3/23/2022  1:30 PM SILVIA Alcocer - CNP [facility]   3/28/2022  3:30 PM MD AMBER [address] [facility] [place] - [facility]   2022  1:00 PM MD AMBER Dia [address] [facility] [place] - DYD       Thank You,    Raúl Christianson RN  Care Transition Coordinator  Contact Number:[phone]

## 2022-03-22 NOTE — TELEPHONE ENCOUNTER
FYI:    Called to notify us that Mr. Des Youssef was hospitalized at [facility]. from 03/17/22 to 03/19/22. He had heart failure, afib and cad.

## 2022-03-23 ENCOUNTER — FOLLOWUP TELEPHONE ENCOUNTER (OUTPATIENT)
Dept: INPATIENT UNIT | Age: 82
End: 2022-03-23

## 2022-03-23 ENCOUNTER — OFFICE VISIT (OUTPATIENT)
Dept: CARDIOLOGY CLINIC | Age: 82
End: 2022-03-23
Payer: COMMERCIAL

## 2022-03-23 VITALS
HEIGHT: 68 IN | HEART RATE: 95 BPM | DIASTOLIC BLOOD PRESSURE: 72 MMHG | SYSTOLIC BLOOD PRESSURE: 134 MMHG | WEIGHT: 195 LBS | OXYGEN SATURATION: 94 % | BODY MASS INDEX: 29.55 KG/M2

## 2022-03-23 DIAGNOSIS — I48.0 PAF (PAROXYSMAL ATRIAL FIBRILLATION) (HCC): ICD-10-CM

## 2022-03-23 DIAGNOSIS — I07.1 TRICUSPID VALVE INSUFFICIENCY, UNSPECIFIED ETIOLOGY: ICD-10-CM

## 2022-03-23 DIAGNOSIS — E78.5 HYPERLIPIDEMIA, UNSPECIFIED HYPERLIPIDEMIA TYPE: ICD-10-CM

## 2022-03-23 DIAGNOSIS — I50.33 ACUTE ON CHRONIC DIASTOLIC HEART FAILURE (HCC): ICD-10-CM

## 2022-03-23 DIAGNOSIS — I10 ESSENTIAL HYPERTENSION: ICD-10-CM

## 2022-03-23 DIAGNOSIS — I25.10 CAD IN NATIVE ARTERY: Primary | ICD-10-CM

## 2022-03-23 DIAGNOSIS — Z95.1 HX OF CABG: ICD-10-CM

## 2022-03-23 DIAGNOSIS — I34.0 MITRAL VALVE INSUFFICIENCY, UNSPECIFIED ETIOLOGY: ICD-10-CM

## 2022-03-23 PROCEDURE — 99495 TRANSJ CARE MGMT MOD F2F 14D: CPT | Performed by: NURSE PRACTITIONER

## 2022-03-23 PROCEDURE — 1111F DSCHRG MED/CURRENT MED MERGE: CPT | Performed by: NURSE PRACTITIONER

## 2022-03-23 NOTE — FLOWSHEET NOTE
JOAO 3/21/22 -- reached out to [address] [place] [address] to confirm if pt was enrolled post hospital discharge. He had been active with them prior. Message left. JOAO 3/22/22 1040am -- received call back from [address] [place] [address] that pt's info was received and is known to them. They had MICHAEL with RN visit on 3/21/22. This will serve as the required 72 hour HF hospital follow up.

## 2022-03-23 NOTE — PROGRESS NOTES
CC: hospital follow up. HPI:  [de-identified] y.o. patient of Sharon Dorsey and Jada Day with AFib SVR,  CAD/CABG, HTN, HLD and chronic HFpEF and mild/moderate MR and mild/moderate TR. He is to wear the cardiac monitor until .     He has SOB on Monday morning and SOB improved with 1 subling nitro. No further episodes of SOB since. Feels weak sometimes. He denies cp, LH/dizziness, palpitations, syncope or falls. No LE edema, orthopnea, PND, or early satiety. Weight is down. He uses compression stockings during the day. Notes frequent urination. No n/v/d, fever or GI/ bleeding. He is not interested in eliquis d/t potential side effects. Reviewed R/B/A to NOAC vs warfarin vs asa 325 mg. Pt chooses to stop eliquis and take asa 325 mg.      Past Medical History:   Diagnosis Date    Contact dermatitis and other eczema, due to unspecified cause     Coronary atherosclerosis of unspecified type of vessel, native or graft     Cardio- Dr. Wilma Kemp Diastolic CHF [facility])     Gout, unspecified     Hypertension     Hypertrophy of prostate without urinary obstruction and other lower urinary tract symptoms (LUTS)     Other and unspecified hyperlipidemia     Peripheral vascular complications      Past Surgical History:   Procedure Laterality Date    CARDIAC SURGERY      4 vessel cabg Dr. Phuong Shaw y    COLONOSCOPY  07    Dr. Rosalinda Blunt 2012    COLONOSCOPY  5.23.13    Carolee Smallwood MD polyp repeat 2018    COLONOSCOPY  2018    Dr. Casiano Brought polyp removed no repeat necessary   [address]      Dr. Miriam Plascencia     Family History   Problem Relation Age of Onset    Coronary Art Dis Father      Social History     Tobacco Use    Smoking status: Former Smoker     Packs/day: 1.50     Years: 50.00     Pack years: 75.00     Types: Cigarettes     Quit date: 2003     Years since quittin.2    Smokeless tobacco: Former User   Substance Use Topics    Alcohol use: Yes     Comment: Seldom    Drug use: No     Allergies:Sulfa antibiotics    Review of Systems  General: No changes in weight,  or night sweats. HEENT: No blurry or decreased vision. No changes in hearing, nasal discharge or sore throat. Cardiovascular:  See HPI. Respiratory: No cough, hemoptysis, or wheezing. Gastrointestinal:  No abdominal pain, hematochezia, melana, constipation, diarrhea, or history of GI ulcers. Genito-Urinary: No dysuria or hematuria. No urgency or polyuria. Musculoskeletal:  No complaints of joint pain, joint swelling or muscular weakness/soreness. Neurological:  No dizziness, headaches, numbness/tingling, speech problems or weakness. Psychological:  No anxiety or depression. Hematological and Lymphatic: No abnormal bleeding or bruising, blood clots, jaundice or swollen lymph nodes. Endocrine:   No malaise/lethargy, palpitations, polydipsia/polyuria, temperature intolerance or unexpected weight changes  Skin:  No rashes or non-healing ulcers. Physical Exam:  /72   Pulse 95   Ht 5' 8\" (1.727 m)   Wt 195 lb (88.5 kg)   SpO2 94%   BMI 29.65 kg/m²    General (appearance):  No acute distress  Eyes: anicteric   Neck: soft, No JVD  Ears/Nose/Mouth/Thorat: No cyanosis  CV: Irreg soft ODALYS   Respiratory:  Clear, no crackles or wheeze  GI: soft, non-tender, non-distended  Skin: Warm, dry. No rashes  Neuro/Psych: Alert and oriented x 3. Appropriate behavior  Ext:  No c/c.  No edema  Pulses:  2 radial     Weight  Wt Readings from Last 3 Encounters:   03/19/22 196 lb 8 oz (89.1 kg)   02/23/22 209 lb 12.8 oz (95.2 kg)   12/07/21 209 lb (94.8 kg)          CBC:   Lab Results   Component Value Date    WBC 8.5 03/19/2022    HGB 12.6 (L) 03/19/2022    HCT 38.6 (L) 03/19/2022    MCV 81.0 03/19/2022     03/19/2022     BMP:  Lab Results   Component Value Date    CREATININE 1.4 (H) 03/19/2022    BUN 24 (H) 03/19/2022     03/19/2022    K 4.0 03/19/2022     03/19/2022    CO2 27 03/19/2022     Mag:   Lab Results   Component Value Date    MG 1.90 03/19/2022     LIVER PROFILE:   Lab Results   Component Value Date    ALT 30 02/24/2022    AST 27 02/24/2022    ALKPHOS 103 02/24/2022    BILITOT 0.9 02/24/2022     PT/INR: No results found for: INR, PROTIME  Pro-BNP   Lab Results   Component Value Date    PROBNP 1,672 03/17/2022     LIPIDS:  No components found for: CHLPL  Lab Results   Component Value Date    TRIG 83 03/18/2022    TRIG 69 02/24/2022    TRIG 104 10/12/2021     Lab Results   Component Value Date    HDL 37 (L) 03/18/2022    HDL 45 02/24/2022    HDL 37 (L) 10/12/2021     Lab Results   Component Value Date    LDLCALC 82 03/18/2022    LDLCALC 65 02/24/2022    LDLCALC 65 10/12/2021     Lab Results   Component Value Date    LABVLDL 17 03/18/2022    LABVLDL 14 02/24/2022    LABVLDL 21 10/12/2021     TSH:  Lab Results   Component Value Date    TSH 2.94 08/18/2017       IMAGING:     3/17/2022 Echo:     Normal left ventricle size. There is mild to moderate concentric left   ventricular hypertrophy. Overall left ventricular systolic function appears   normal 55-60%. No regional wall motion abnormalities are noted. Indeterminate diastolic function. Mild to moderate mitral regurgitation is present. Mild to moderate tricuspid regurgitation. Estimated pulmonary artery systolic pressure is 50 mmHg assuming a right   atrial pressure of 8 mmHg. Inferior vena cava appears dilated. IVC respiratory change in dimension < 50%. 2019 Nuc stress:      Overall findings represent a low risk scan.    Normal LV size and systolic function.    Small fixed defect involving the apex, otherwise no evidence of ischemia.    ECG: Non-diagnostic EKG response due to baseline abnormalities.      [id number] Score for Atrial Fibrillation Stroke Risk   Risk   Factors  Component Value   C CHF Yes 1   H HTN Yes 1   A2 Age >= 76 Yes,  [de-identified] y.o.) 2   D DM No 0   S2 Prior Stroke/TIA No 0   V Vascular Disease No 0   A Age 74-69 No,  [de-identified] y.o.) 0   Sc Sex male 0    [id number]  Score  4   Score last updated 3/23/22 2:59 PM EDT    Click here for a link to the UpToDate guideline \"Atrial Fibrillation: Anticoagulation therapy to prevent embolization    Disclaimer: Risk Score calculation is dependent on accuracy of patient problem list and past encounter diagnosis. Medications:   Current Outpatient Medications   Medication Sig Dispense Refill    aspirin 81 MG EC tablet Take 1 tablet by mouth daily (Patient taking differently: Take 325 mg by mouth daily ) 30 tablet 3    apixaban (ELIQUIS) 5 MG TABS tablet Take 1 tablet by mouth 2 times daily 60 tablet 0    furosemide (LASIX) 40 MG tablet Take 1 tablet by mouth daily 60 tablet 3    isosorbide mononitrate (IMDUR) 60 MG extended release tablet Take 60 mg by mouth in the morning and at bedtime      tamsulosin (FLOMAX) 0.4 MG capsule Take 0.4 mg by mouth daily      atorvastatin (LIPITOR) 40 MG tablet Take 1 tablet by mouth daily 90 tablet 1    amLODIPine (NORVASC) 2.5 MG tablet Take 1 tablet by mouth daily 90 tablet 1    HYDROcodone-acetaminophen (NORCO) 5-325 MG per tablet Take 1 tablet by mouth every 6 hours as needed for Pain for up to 30 days. 30 tablet 0    zolpidem (AMBIEN) 10 MG tablet Take 1 tablet by mouth nightly as needed for Sleep for up to 30 days. 90 tablet 0    colchicine (COLCRYS) 0.6 MG tablet TAKE ONE TABLET BY MOUTH DAILY 90 tablet 1    vitamin D (CHOLECALCIFEROL) 1000 UNIT TABS tablet Take 2,000 Int'l Units by mouth daily.  finasteride (PROSCAR) 5 MG tablet Take 5 mg by mouth daily.       famotidine (PEPCID) 20 MG tablet Take 1 tablet by mouth 2 times daily (Patient not taking: Reported on 3/23/2022) 60 tablet 3    polyethylene glycol (GLYCOLAX) 17 g packet Take 17 g by mouth daily as needed for Constipation (Patient not taking: Reported on 3/23/2022) 527 g 1    allopurinol (ZYLOPRIM) 300 MG tablet TAKE ONE TABLET BY MOUTH DAILY (Patient not taking: Reported on 3/23/2022) 90 tablet 1     No current facility-administered medications for this visit. Assessment:  1. CAD in native artery    2. Hx of CABG    3. Essential hypertension    4. Hyperlipidemia, unspecified hyperlipidemia type    5. Acute on chronic diastolic heart failure ([address]    6. Mitral valve insufficiency, unspecified etiology    7. Tricuspid valve insufficiency, unspecified etiology    8. PAF (paroxysmal atrial fibrillation) (formerly [facility])        Plan:  CAD/CABG: stable   Asa, statin   imdur   No bb d/t slow heart rate    No ACE/ARB d/t CKD  HTN; stable   /72   norvasc  HLD: stable   LDL 82   Lipitor  HFpEF/MR/TR: stable   Appears compensated   Lasix 40 mg po daily    Discussed s/s decompensated HF   Daily weights low salt diet  pAF : stable    A fib with slow rate   No BB   Cardiac monitor in process   Discussed R/B/A to NOAC vs Warfarin vs . Discussed CHADVASC score and risk of stroke. Pt opted to take asa 325 mg and stop eliquis. Has follow up with EP. Reviewed most recent: CBC, BMP, LFT, Lipids, Mag, BNP, TSH  Reviewed most recent: ECG, Echo, Nuc stress test,         Post-Discharge Transitional Care  Follow Up      Avelina Gilbert   YOB: 1940    Date of Office Visit:  3/23/2022  Date of Hospital Admission: 3/17/22  Date of Hospital Discharge: 3/19/22  Risk of hospital readmission (high >=14%. Medium >=10%) :Readmission Risk Score: 10.9 ( )      Care management risk score Rising risk (score 2-5) and Complex Care (Scores >=6): 1     Non face to face  following discharge, date last encounter closed (first attempt may have been earlier): 3/22/2022 11:21 AM    Call initiated 2 business days of discharge:  Yes    ASSESSMENT/PLAN:   CAD in native artery  -     [place] DISCHARGE MEDS RECONCILED W/ CURRENT OUTPATIENT MED LIST  Hx of CABG  -     SC DISCHARGE MEDS RECONCILED W/ CURRENT OUTPATIENT MED LIST  Essential hypertension  -     [place] DISCHARGE MEDS RECONCILED W/ CURRENT OUTPATIENT MED LIST  Hyperlipidemia, unspecified hyperlipidemia type  -     [place] DISCHARGE MEDS RECONCILED W/ CURRENT OUTPATIENT MED LIST  Acute on chronic diastolic heart failure (HCC)  -     [place] DISCHARGE MEDS RECONCILED W/ CURRENT OUTPATIENT MED LIST  Mitral valve insufficiency, unspecified etiology  -     [place] DISCHARGE MEDS RECONCILED W/ CURRENT OUTPATIENT MED LIST  Tricuspid valve insufficiency, unspecified etiology  -     [place] DISCHARGE MEDS RECONCILED W/ CURRENT OUTPATIENT MED LIST  PAF (paroxysmal atrial fibrillation) (HCC)  -     [place] DISCHARGE MEDS RECONCILED W/ CURRENT OUTPATIENT MED LIST      Medical Decision Making: moderate complexity  Return in 1 month (on 4/23/2022). On this date 3/23/2022 I have spent 35 minutes reviewing previous notes, test results and face to face with the patient discussing the diagnosis and importance of compliance with the treatment plan as well as documenting on the day of the visit. Subjective:   HPI:  Follow up of Hospital problems/diagnosis(es): See above     Inpatient course: Discharge summary reviewed- see chart. Interval history/Current status: see above note.      Patient Active Problem List   Diagnosis    Gout    Coronary atherosclerosis    Benign non-nodular prostatic hyperplasia with lower urinary tract symptoms    Pain in limb    Hyperglycemia    Essential hypertension    Hx of colonic polyp    Chest pain    Hyperlipidemia    Other constipation    Multiple atypical skin moles    Vertigo    Age-related cataract of right eye    Atherosclerosis of native coronary artery of native heart with other form of angina pectoris ([address]    Acute pulmonary edema (HCC)    Acute congestive heart failure ([address]    Atrial fibrillation ([address]    Acute urinary retention    Bradycardia    Acute on chronic diastolic heart failure (HCC)       Medications listed as ordered at the time of discharge from hospital  [unfilled]      Medications marked \"taking\" at this time  Outpatient Medications Marked as Taking for the 3/23/22 encounter (Office Visit) with SILVIA Quintana CNP   Medication Sig Dispense Refill    aspirin 81 MG EC tablet Take 1 tablet by mouth daily (Patient taking differently: Take 325 mg by mouth daily ) 30 tablet 3    apixaban (ELIQUIS) 5 MG TABS tablet Take 1 tablet by mouth 2 times daily 60 tablet 0    furosemide (LASIX) 40 MG tablet Take 1 tablet by mouth daily 60 tablet 3    isosorbide mononitrate (IMDUR) 60 MG extended release tablet Take 60 mg by mouth in the morning and at bedtime      tamsulosin (FLOMAX) 0.4 MG capsule Take 0.4 mg by mouth daily      atorvastatin (LIPITOR) 40 MG tablet Take 1 tablet by mouth daily 90 tablet 1    amLODIPine (NORVASC) 2.5 MG tablet Take 1 tablet by mouth daily 90 tablet 1    HYDROcodone-acetaminophen (NORCO) 5-325 MG per tablet Take 1 tablet by mouth every 6 hours as needed for Pain for up to 30 days. 30 tablet 0    zolpidem (AMBIEN) 10 MG tablet Take 1 tablet by mouth nightly as needed for Sleep for up to 30 days. 90 tablet 0    colchicine (COLCRYS) 0.6 MG tablet TAKE ONE TABLET BY MOUTH DAILY 90 tablet 1    vitamin D (CHOLECALCIFEROL) 1000 UNIT TABS tablet Take 2,000 Int'l Units by mouth daily.  finasteride (PROSCAR) 5 MG tablet Take 5 mg by mouth daily. Medications patient taking as of now reconciled against medications ordered at time of hospital discharge: Yes    A comprehensive review of systems was negative except for what was noted in the HPI. Objective:    /72   Pulse 95   Ht 5' 8\" (1.727 m)   Wt 195 lb (88.5 kg)   SpO2 94%   BMI 29.65 kg/m²   See note above. An electronic signature was used to authenticate this note.   --SILVIA Quintana CNP

## 2022-03-24 ENCOUNTER — TELEPHONE (OUTPATIENT)
Dept: INTERNAL MEDICINE CLINIC | Age: 82
End: 2022-03-24

## 2022-03-24 NOTE — TELEPHONE ENCOUNTER
[facility] called into the office to request the office fax back the Physician order effective march 8th. Fax: [phone]        Ext 132      Please advise.

## 2022-03-28 ENCOUNTER — OFFICE VISIT (OUTPATIENT)
Dept: INTERNAL MEDICINE CLINIC | Age: 82
End: 2022-03-28
Payer: COMMERCIAL

## 2022-03-28 VITALS
TEMPERATURE: 98.8 F | WEIGHT: 198 LBS | OXYGEN SATURATION: 97 % | SYSTOLIC BLOOD PRESSURE: 132 MMHG | HEART RATE: 61 BPM | DIASTOLIC BLOOD PRESSURE: 60 MMHG | BODY MASS INDEX: 30.01 KG/M2 | HEIGHT: 68 IN

## 2022-03-28 DIAGNOSIS — I48.0 PAROXYSMAL ATRIAL FIBRILLATION (HCC): ICD-10-CM

## 2022-03-28 DIAGNOSIS — I25.118 ATHEROSCLEROSIS OF NATIVE CORONARY ARTERY OF NATIVE HEART WITH OTHER FORM OF ANGINA PECTORIS (HCC): ICD-10-CM

## 2022-03-28 DIAGNOSIS — I10 ESSENTIAL HYPERTENSION: ICD-10-CM

## 2022-03-28 DIAGNOSIS — I50.32 CHRONIC DIASTOLIC (CONGESTIVE) HEART FAILURE (HCC): Primary | ICD-10-CM

## 2022-03-28 PROCEDURE — 1036F TOBACCO NON-USER: CPT | Performed by: INTERNAL MEDICINE

## 2022-03-28 PROCEDURE — G8427 DOCREV CUR MEDS BY ELIG CLIN: HCPCS | Performed by: INTERNAL MEDICINE

## 2022-03-28 PROCEDURE — 1123F ACP DISCUSS/DSCN MKR DOCD: CPT | Performed by: INTERNAL MEDICINE

## 2022-03-28 PROCEDURE — 99214 OFFICE O/P EST MOD 30 MIN: CPT | Performed by: INTERNAL MEDICINE

## 2022-03-28 PROCEDURE — G8484 FLU IMMUNIZE NO ADMIN: HCPCS | Performed by: INTERNAL MEDICINE

## 2022-03-28 PROCEDURE — 4040F PNEUMOC VAC/ADMIN/RCVD: CPT | Performed by: INTERNAL MEDICINE

## 2022-03-28 PROCEDURE — G8417 CALC BMI ABV UP PARAM F/U: HCPCS | Performed by: INTERNAL MEDICINE

## 2022-03-28 PROCEDURE — 1111F DSCHRG MED/CURRENT MED MERGE: CPT | Performed by: INTERNAL MEDICINE

## 2022-03-28 RX ORDER — ASPIRIN 325 MG
325 TABLET ORAL DAILY
COMMUNITY
End: 2022-04-07

## 2022-03-28 ASSESSMENT — ENCOUNTER SYMPTOMS
SHORTNESS OF BREATH: 0
ABDOMINAL PAIN: 0
EYE REDNESS: 0
NAUSEA: 0
BACK PAIN: 0
CHEST TIGHTNESS: 0

## 2022-03-28 ASSESSMENT — PATIENT HEALTH QUESTIONNAIRE - PHQ9
SUM OF ALL RESPONSES TO PHQ QUESTIONS 1-9: 0
1. LITTLE INTEREST OR PLEASURE IN DOING THINGS: 0
SUM OF ALL RESPONSES TO PHQ QUESTIONS 1-9: 0
SUM OF ALL RESPONSES TO PHQ9 QUESTIONS 1 & 2: 0
SUM OF ALL RESPONSES TO PHQ QUESTIONS 1-9: 0
SUM OF ALL RESPONSES TO PHQ QUESTIONS 1-9: 0
2. FEELING DOWN, DEPRESSED OR HOPELESS: 0

## 2022-03-28 NOTE — PROGRESS NOTES
Subjective:      Patient ID: Ranulfo Solis is a [de-identified] y.o. male    Chief Complaint   Patient presents with    Follow-Up from Hospital       HPI     Paroxysmal a fib. He was recommended to started on Eliquis but has been too anxious to start on it. He is worried about side effects. He had a fib at the hospital, and when it started is uncertain. CHF  Improved. Went to the hospital with decompensation, acute pulmonary edema, acute chf. Better now. Current Outpatient Medications on File Prior to Visit   Medication Sig Dispense Refill    aspirin 325 MG tablet Take 325 mg by mouth daily      apixaban (ELIQUIS) 5 MG TABS tablet Take 1 tablet by mouth 2 times daily 60 tablet 0    furosemide (LASIX) 40 MG tablet Take 1 tablet by mouth daily 60 tablet 3    famotidine (PEPCID) 20 MG tablet Take 1 tablet by mouth 2 times daily 60 tablet 3    polyethylene glycol (GLYCOLAX) 17 g packet Take 17 g by mouth daily as needed for Constipation 527 g 1    isosorbide mononitrate (IMDUR) 60 MG extended release tablet Take 60 mg by mouth in the morning and at bedtime      tamsulosin (FLOMAX) 0.4 MG capsule Take 0.4 mg by mouth daily      atorvastatin (LIPITOR) 40 MG tablet Take 1 tablet by mouth daily 90 tablet 1    allopurinol (ZYLOPRIM) 300 MG tablet TAKE ONE TABLET BY MOUTH DAILY 90 tablet 1    amLODIPine (NORVASC) 2.5 MG tablet Take 1 tablet by mouth daily 90 tablet 1    colchicine (COLCRYS) 0.6 MG tablet TAKE ONE TABLET BY MOUTH DAILY 90 tablet 1    vitamin D (CHOLECALCIFEROL) 1000 UNIT TABS tablet Take 2,000 Int'l Units by mouth daily.  finasteride (PROSCAR) 5 MG tablet Take 5 mg by mouth daily. No current facility-administered medications on file prior to visit.        Allergies   Allergen Reactions    Sulfa Antibiotics        Past Medical History:   Diagnosis Date    Contact dermatitis and other eczema, due to unspecified cause     Coronary atherosclerosis of unspecified type of vessel, native or graft     Cardio- Dr. Jerry Chris Diastolic CHF [facility])     Gout, unspecified     Hypertension     Hypertrophy of prostate without urinary obstruction and other lower urinary tract symptoms (LUTS)     Other and unspecified hyperlipidemia     Peripheral vascular complications      Past Surgical History:   Procedure Laterality Date    CARDIAC SURGERY      4 vessel cabg Dr. Hyacinth Xiao y    COLONOSCOPY  07    Dr. Altamirano Salvage 2012    COLONOSCOPY  5.23.13    Yan Paige MD polyp repeat 2018    COLONOSCOPY  2018    Dr. Roslyn Crawford polyp removed no repeat necessary   [address]      Dr. Flora Berrios History    Marital status:      Spouse name: Not on file    Number of children: Not on file    Years of education: Not on file    Highest education level: Not on file   Occupational History    Not on file   Tobacco Use    Smoking status: Former Smoker     Packs/day: 1.50     Years: 50.00     Pack years: 75.00     Types: Cigarettes     Quit date: 2003     Years since quittin.2    Smokeless tobacco: Former User   Substance and Sexual Activity    Alcohol use: Yes     Comment: Seldom    Drug use: No    Sexual activity: Yes     Partners: Female   Other Topics Concern    Not on file   Social History Narrative    Not on file     Social Determinants of Health     Financial Resource Strain: Low Risk     Difficulty of Paying Living Expenses: Not hard at all   Food Insecurity: No Food Insecurity    Worried About [facility] in the Last Year: Never true    [facility] in the Last Year: Never true   Transportation Needs:     Lack of Transportation (Medical): Not on file    Lack of Transportation (Non-Medical):  Not on file   Physical Activity:     Days of Exercise per Week: Not on file    Minutes of Exercise per Session: Not on file   Stress:     Feeling of Stress : Not on file   Social Connections:     Frequency of Communication with Friends and Family: Not on file    Frequency of Social Gatherings with Friends and Family: Not on file    Attends Scientologist Services: Not on file    Active Member of Clubs or Organizations: Not on file    Attends Club or Organization Meetings: Not on file    Marital Status: Not on file   Intimate Partner Violence:     Fear of Current or Ex-Partner: Not on file    Emotionally Abused: Not on file    Physically Abused: Not on file    Sexually Abused: Not on file   Housing Stability:     Unable to Pay for Housing in the Last Year: Not on file    Number of [place] in the Last Year: Not on file    Unstable Housing in the Last Year: Not on file     Family History   Problem Relation Age of Onset    Coronary Art Dis Father      Immunization History   Administered Date(s) Administered    COVID-19, Pfizer Purple top, DILUTE for use, 12+ yrs, 30mcg/0.3mL dose 02/25/2021, 03/18/2021, 12/08/2021    Pneumococcal Conjugate 13-valent (Teri Sherron) 05/17/2016    Td, unspecified formulation 05/17/2016       Review of Systems   Constitutional: Negative for fatigue, fever and unexpected weight change. HENT: Negative for hearing loss. Eyes: Negative for redness and visual disturbance. Respiratory: Negative for chest tightness and shortness of breath. Cardiovascular: Negative for chest pain and palpitations. Gastrointestinal: Negative for abdominal pain and nausea. Endocrine: Negative for polydipsia and polyuria. Genitourinary: Negative for dysuria and hematuria. Musculoskeletal: Negative for arthralgias, back pain and neck pain. Skin: Negative for rash and wound. Neurological: Negative for dizziness and headaches. Hematological: Negative for adenopathy. Does not bruise/bleed easily. Psychiatric/Behavioral: Negative for agitation. The patient is not nervous/anxious. Objective:    Physical Exam  Constitutional:       Appearance: He is well-developed. He is obese. Cardiovascular:      Rate and Rhythm: Normal rate and regular rhythm. Heart sounds: Normal heart sounds. No murmur heard. Pulmonary:      Effort: Pulmonary effort is normal.      Breath sounds: Normal breath sounds. No rales. Musculoskeletal:      Right lower leg: No edema. Left lower leg: No edema. Skin:     Findings: No rash. Neurological:      Mental Status: He is alert and oriented to person, place, and time. Psychiatric:         Mood and Affect: Mood normal.       Vitals:    03/28/22 1538   BP: 132/60   Pulse: 61   Temp: 98.8 °F (37.1 °C)   SpO2: 97%       Assessment and plan       1. Chronic diastolic (congestive) heart failure (HCC)  Improved. Meds reviewed. - [place] DISCHARGE MEDS RECONCILED W/ CURRENT OUTPATIENT MED LIST    2. Paroxysmal atrial fibrillation (HCC)  I encouraged him to start the Eliquis but her declined at this time. He is wearing a 2 week heart moniter.   - [place] DISCHARGE MEDS RECONCILED W/ CURRENT OUTPATIENT MED LIST    3. Essential hypertension  Stable. Continue Rx.   - [place] DISCHARGE MEDS RECONCILED W/ CURRENT OUTPATIENT MED LIST    4. Atherosclerosis of native coronary artery of native heart with other form of angina pectoris ([facility]  Stable. ASA daily, but he is not taking the Eliquis.    - [place] DISCHARGE MEDS RECONCILED W/ CURRENT OUTPATIENT MED LIST

## 2022-04-06 ENCOUNTER — TELEPHONE (OUTPATIENT)
Dept: CARDIOLOGY CLINIC | Age: 82
End: 2022-04-06

## 2022-04-06 NOTE — TELEPHONE ENCOUNTER
Spoke w/son, Henry Clause re: CAM report w/pauses (6 pauses, up to 2.8 seconds). Per Catalina Thomas pt to see EP. Advised of OV 4/7 at 1pm, confirmed appt time,date and location.

## 2022-04-06 NOTE — TELEPHONE ENCOUNTER
Ez Ventura from [facility] called to inform us of pts urgent results from monitor. On 3/29/2022 at 2:25 am his HR was under 30 beats for 16 seconds. His lowest HR was 26 beats per minute. He had 6 pauses. avg lasting 2.8.      Report will be loaded in next 5 mins

## 2022-04-07 ENCOUNTER — OFFICE VISIT (OUTPATIENT)
Dept: CARDIOLOGY CLINIC | Age: 82
End: 2022-04-07
Payer: COMMERCIAL

## 2022-04-07 VITALS
WEIGHT: 197 LBS | HEART RATE: 70 BPM | BODY MASS INDEX: 29.95 KG/M2 | SYSTOLIC BLOOD PRESSURE: 130 MMHG | DIASTOLIC BLOOD PRESSURE: 80 MMHG

## 2022-04-07 DIAGNOSIS — I48.19 PERSISTENT ATRIAL FIBRILLATION (HCC): Primary | ICD-10-CM

## 2022-04-07 DIAGNOSIS — E78.5 HYPERLIPIDEMIA, UNSPECIFIED HYPERLIPIDEMIA TYPE: ICD-10-CM

## 2022-04-07 DIAGNOSIS — I10 ESSENTIAL HYPERTENSION: ICD-10-CM

## 2022-04-07 DIAGNOSIS — I25.10 CAD IN NATIVE ARTERY: ICD-10-CM

## 2022-04-07 PROCEDURE — 99214 OFFICE O/P EST MOD 30 MIN: CPT | Performed by: INTERNAL MEDICINE

## 2022-04-07 PROCEDURE — G8417 CALC BMI ABV UP PARAM F/U: HCPCS | Performed by: INTERNAL MEDICINE

## 2022-04-07 PROCEDURE — 4040F PNEUMOC VAC/ADMIN/RCVD: CPT | Performed by: INTERNAL MEDICINE

## 2022-04-07 PROCEDURE — 93000 ELECTROCARDIOGRAM COMPLETE: CPT | Performed by: INTERNAL MEDICINE

## 2022-04-07 PROCEDURE — 1036F TOBACCO NON-USER: CPT | Performed by: INTERNAL MEDICINE

## 2022-04-07 PROCEDURE — 1111F DSCHRG MED/CURRENT MED MERGE: CPT | Performed by: INTERNAL MEDICINE

## 2022-04-07 PROCEDURE — G8427 DOCREV CUR MEDS BY ELIG CLIN: HCPCS | Performed by: INTERNAL MEDICINE

## 2022-04-07 PROCEDURE — 1123F ACP DISCUSS/DSCN MKR DOCD: CPT | Performed by: INTERNAL MEDICINE

## 2022-04-07 PROCEDURE — 93248 EXT ECG>7D<15D REV&INTERPJ: CPT | Performed by: INTERNAL MEDICINE

## 2022-04-07 RX ORDER — ASPIRIN 81 MG/1
81 TABLET ORAL DAILY
Qty: 90 TABLET | Refills: 3 | Status: SHIPPED | OUTPATIENT
Start: 2022-04-07

## 2022-04-07 NOTE — PROGRESS NOTES
[address]   Cardiac Electrophysiology Consultation   Date: 4/7/2022  Reason for Consultation:  AF  Consult Requesting Physician: No att. providers found     Chief Complaint:   Chief Complaint   Patient presents with    Atrial Fibrillation      HPI: Wili Pina is a [de-identified] y.o. male with PMH significant for CAD, s/p CABG (2003), HTN, HLD, HFpEF, follows Dr. Airam Douglas. In 3/2022, admitted for CHF exacerbation, found to be in AF with slow ventricular response, rate down to 32 bpm while sleeping and pauses <2.5 sec, asymptomatic, started on Eliquis. Preserved LVEF with LAE (4.6 cm). Interval History: Today, he is here for hospital f/u. Outpatient monitor showed 100% AF with average HR 61 (), 6 pauses up to 2.8 sec, and 3 runs of NSVT (longest 10 beats). Denies complaints of palpitations, dizziness, CP, SOB, orthopnea, presyncope, or syncope. Duration of AF is unclear. Pt thinks he has had it for 20 yrs; however, no previous cardiology notes address it and he was not on "[employer] prior to recent hospitalization. He admits to not starting the Eliquis yet. He is planning on having prostate surgery on 6/7/22 and surgeon has asked that he be off Eliquis for 1 week prior and 1 week after. Assessment:  1. Persistent AF SVR, on Eliquis  2. Bradycardia  3. CAD, s/p CABG, on ASA  4. HTN, stable on amlodipine  5. HLD, on statin  6. TR / MR, mild/moderate    Plan:  1. Decrease ASA to 81 mg daily  2. Start Eliquis 5 mg BID today  3. We will plan on DCCV. However, he needs to be on uninterrupted [address] for 3 weeks before and 4 weeks after DCCV. As his prostate surgery is the beginning of June and he'll be off [address] for 2 weeks, we will plan for the DCCV after his prostate surgery (July).     3.  Follow up with me 1 mo after the DCCV    Past Medical History:   Diagnosis Date    Contact dermatitis and other eczema, due to unspecified cause     Coronary atherosclerosis of unspecified type of vessel, native or graft     Cardio- Dr. Taz Burnette Diastolic CHF [facility])     Gout, unspecified     Hypertension     Hypertrophy of prostate without urinary obstruction and other lower urinary tract symptoms (LUTS)     Other and unspecified hyperlipidemia     Peripheral vascular complications         Past Surgical History:   Procedure Laterality Date    CARDIAC SURGERY      4 vessel cabg Dr. Yenni Aleman y    COLONOSCOPY  2/16/07    Dr. Julianna Buitrago 2/2012    COLONOSCOPY  5.23.13    Emma Kidd MD polyp repeat 2018    COLONOSCOPY  06/18/2018    Dr. Hayes Sample polyp removed no repeat necessary   [address]  2003    Dr. Tomas Salgado       Allergies: Allergies   Allergen Reactions    Sulfa Antibiotics        Medication:   Prior to Admission medications    Medication Sig Start Date End Date Taking?  Authorizing Provider   aspirin 325 MG tablet Take 325 mg by mouth daily   Yes Historical Provider, MD   furosemide (LASIX) 40 MG tablet Take 1 tablet by mouth daily 3/19/22  Yes Daja Cox MD   famotidine (PEPCID) 20 MG tablet Take 1 tablet by mouth 2 times daily 3/19/22  Yes Daja Cox MD   polyethylene glycol (GLYCOLAX) 17 g packet Take 17 g by mouth daily as needed for Constipation 3/19/22 4/18/22 Yes Daja Cox MD   isosorbide mononitrate (IMDUR) 60 MG extended release tablet Take 60 mg by mouth in the morning and at bedtime   Yes Historical Provider, MD   tamsulosin (FLOMAX) 0.4 MG capsule Take 0.4 mg by mouth daily   Yes Historical Provider, MD   atorvastatin (LIPITOR) 40 MG tablet Take 1 tablet by mouth daily 2/23/22  Yes Rubina Hinton MD   allopurinol (ZYLOPRIM) 300 MG tablet TAKE ONE TABLET BY MOUTH DAILY 2/23/22  Yes Rubina Hinton MD   amLODIPine (NORVASC) 2.5 MG tablet Take 1 tablet by mouth daily 2/23/22  Yes Rubina Hinton MD   colchicine (COLCRYS) 0.6 MG tablet TAKE ONE TABLET BY MOUTH DAILY 8/24/20  Yes Rubina Hinton MD   vitamin D (CHOLECALCIFEROL) 1000 UNIT TABS tablet Take 2,000 Int'l Units by mouth daily. Yes Historical Provider, MD   finasteride (PROSCAR) 5 MG tablet Take 5 mg by mouth daily. Yes Historical Provider, MD       Social History:   reports that he quit smoking about 19 years ago. His smoking use included cigarettes. He has a 75.00 pack-year smoking history. He has quit using smokeless tobacco. He reports current alcohol use. He reports that he does not use drugs. Family History:  family history includes Coronary Art Dis in his father. Reviewed. Denies family history of sudden cardiac death, arrhythmia, premature CAD    Review of System:    · General ROS: negative for - chills, fever   · Psychological ROS: negative for - anxiety or depression  · Ophthalmic ROS: negative for - eye pain or loss of vision  · ENT ROS: negative for - epistaxis, headaches, nasal discharge, sore throat   · Allergy and Immunology ROS: negative for - hives, nasal congestion   · Hematological and Lymphatic ROS: negative for - bleeding problems, blood clots, bruising or jaundice  · Endocrine ROS: negative for - skin changes, temperature intolerance or unexpected weight changes  · Respiratory ROS: negative for - cough, hemoptysis, pleuritic pain, SOB, sputum changes or wheezing  · Cardiovascular ROS: Per HPI. · Gastrointestinal ROS: negative for - abdominal pain, blood in stools, diarrhea, hematemesis, melena, nausea/vomiting or swallowing difficulty/pain  · Genito-Urinary ROS: negative for - dysuria or incontinence  · Musculoskeletal ROS: negative for - joint swelling or muscle pain  · Neurological ROS: negative for - confusion, dizziness, gait disturbance, headaches, numbness/tingling, seizures, speech problems, tremors, visual changes or weakness  · Dermatological ROS: negative for - rash    Physical Examination:  Vitals:    04/07/22 1300   BP: 130/80   Pulse: 70       · Constitutional: Oriented. No distress. · Head: Normocephalic and atraumatic.    · Mouth/Throat: Oropharynx is clear and moist.   · Eyes: Conjunctivae normal. EOM are normal.   · Neck: Normal range of motion. Neck supple. No rigidity. No JVD present. · Cardiovascular: Normal rate, regular rhythm, S1&S2 and intact distal pulses. · Pulmonary/Chest: Bilateral respiratory sounds. No wheezes. No rhonchi. · Abdominal: Soft. Bowel sounds present. No distension, No tenderness. · Musculoskeletal: No tenderness. No edema    · Lymphadenopathy: Has no cervical adenopathy. · Neurological: Alert and oriented. Cranial nerve appears intact, No Gross deficit   · Skin: Skin is warm and dry. No rash noted. · Psychiatric: Has a normal mood, affect and behavior     Labs:  Reviewed. ECG: reviewed, AF 70     Studies:   1.  14 day CAM (3/18-4/1/22): 100% AF with average HR 61 (), 6 pauses up to 2.8 sec, and 3 runs of NSVT with longest 10 beats and max rate of 137 bpm    2. Echo 3/17/22:   Summary   Normal left ventricle size. There is mild to moderate concentric left ventricular hypertrophy. Overall left ventricular systolic function appears normal 55-60%. No regional wall motion abnormalities are noted. Indeterminate diastolic function. Mild to moderate mitral regurgitation is present. Mild to moderate tricuspid regurgitation. Estimated pulmonary artery systolic pressure is 50 mmHg assuming a right atrial pressure of 8 mmHg. Inferior vena cava appears dilated. IVC respiratory change in dimension < 50%. 3. Stress Test 6/21/19:    Summary    Overall findings represent a low risk scan.    Normal LV size and systolic function.    Small fixed defect involving the apex, otherwise no evidence of ischemia.    ECG: Non-diagnostic EKG response due to baseline abnormalities. The MCOT, echocardiogram, stress test, and coronary angiography/PCI were reviewed by myself and used for my plan of care.      - The patient is counseled to follow a low salt diet to assure blood pressure remains controlled for cardiovascular risk factor modification.   - The patient is counseled to avoid excess caffeine, and energy drinks as this may exacerbated ectopy and arrhythmia. - The patient is counseled to get regular exercise 3-5 times per week to control cardiovascular risk factors. - The patient is counseled to lose weigt to control cardiovascular risk factors. -The patient is counseled about the health hazards of smoking including cardiovascular side effects and its impact on morbidity and mortality. Thank you for allowing me to participate in the care of [address] [place] [address]. All questions and concerns were addressed to the patient/family. Alternatives to my treatment were discussed. Bebe Page RN, am scribing for and in the presence of Dr. Claudetta Gordon. 04/07/22 1:29 PM  MARIA LUZ Olmedo MD, personally performed the services prescribed in this documentation as scribed by Ms. Tiffanie Fine RN in my presence and it is both accurate and complete.        Shahzad Contreras MD  Cardiac Electrophysiology  [facility]

## 2022-04-07 NOTE — LETTER
[address]  EP Procedure Sheet  4/7/22    Marcia Kocher  1940    Physician: Dr. Gloria Galvez    EP Procedures   Pacemaker implant  EP Study    ICD implant  Atrial flutter ablation     Biv implant  Atrial fibrillation ablation    Generator Change  SVT ablation    Lead revision  VT ablation    Lead extraction +/- upgrade  AVN ablation    Loop implant x Cardioversion     Other:   TINO     Equipment   [employer]tronic   MARGO [facility]  CryoAblation    Biotronik  Laser Lead Extraction    Special Equipment       EP Procedures Scheduling Request  Time Requested  1300   Specific Day 4/12/22   Anesthesia    CT surgery backup    Location [facility]     Pre-Procedure Labs / Imaging   PT/INR  Type & cross    CBC  Units PRBC    BMP/Mg  Units FFP    Venogram  CXR    Echo  Pulmonary CTA for Pulmonary vein mapping

## 2022-04-07 NOTE — PATIENT INSTRUCTIONS
External Cardioversion    Date of Procedure:  July 19, 2022    Time of Arrival: 11:30 am    Cardiologist performing procedure:  Dr. Ana Kapoor    · Arrive at [facility] through the main entrance. Check in at the Outpatient Diagnostic desk on the 1st floor. · Do not eat or drink anything after midnight the night before the procedure. · You may brush your teeth and rinse the morning of the procedure. · Take ALL of your routine medications the morning of the procedure, especially your blood thinner. However, if you are taking diabetic medications, please HOLD on the day of the procedure (including insulin). If you take Lantus insulin, take HALF of your usual dose the night before. · Do NOT stop taking aspirin or Eliquis (blood thinners). It is very important to not miss any doses of Eliquis leading up to the cardioversion. Please call our office if you accidentally miss a dose of your blood thinner. · Do not put on any lotion, powder, or deodorant the morning of the procedure. · Please bring a list of your medications to the hospital with you. · You must have someone available to drive you home. It is recommended that you do not drive for 24 hours after the procedure. You will also need someone with you at home the night of the procedure. · If you are unable to make this appointment, please call [facility] Cardiology at [phone].

## 2022-04-08 DIAGNOSIS — I48.0 PAROXYSMAL A-FIB (HCC): ICD-10-CM

## 2022-04-18 RX ORDER — HYDROCHLOROTHIAZIDE 25 MG/1
TABLET ORAL
Qty: 90 TABLET | Refills: 1 | Status: SHIPPED | OUTPATIENT
Start: 2022-04-18 | End: 2022-07-28 | Stop reason: SDUPTHER

## 2022-04-19 ENCOUNTER — OFFICE VISIT (OUTPATIENT)
Dept: CARDIOLOGY CLINIC | Age: 82
End: 2022-04-19
Payer: COMMERCIAL

## 2022-04-19 ENCOUNTER — TELEPHONE (OUTPATIENT)
Dept: CARDIOLOGY CLINIC | Age: 82
End: 2022-04-19

## 2022-04-19 VITALS — SYSTOLIC BLOOD PRESSURE: 152 MMHG | HEART RATE: 72 BPM | DIASTOLIC BLOOD PRESSURE: 80 MMHG

## 2022-04-19 DIAGNOSIS — I25.10 CAD IN NATIVE ARTERY: ICD-10-CM

## 2022-04-19 DIAGNOSIS — Z95.1 HX OF CABG: ICD-10-CM

## 2022-04-19 DIAGNOSIS — I48.19 PERSISTENT ATRIAL FIBRILLATION (HCC): Primary | ICD-10-CM

## 2022-04-19 DIAGNOSIS — I50.32 CHRONIC DIASTOLIC CONGESTIVE HEART FAILURE (HCC): ICD-10-CM

## 2022-04-19 DIAGNOSIS — R00.1 BRADYCARDIA: ICD-10-CM

## 2022-04-19 PROCEDURE — 1123F ACP DISCUSS/DSCN MKR DOCD: CPT | Performed by: INTERNAL MEDICINE

## 2022-04-19 PROCEDURE — G8417 CALC BMI ABV UP PARAM F/U: HCPCS | Performed by: INTERNAL MEDICINE

## 2022-04-19 PROCEDURE — G8428 CUR MEDS NOT DOCUMENT: HCPCS | Performed by: INTERNAL MEDICINE

## 2022-04-19 PROCEDURE — 99214 OFFICE O/P EST MOD 30 MIN: CPT | Performed by: INTERNAL MEDICINE

## 2022-04-19 PROCEDURE — 1036F TOBACCO NON-USER: CPT | Performed by: INTERNAL MEDICINE

## 2022-04-19 PROCEDURE — 4040F PNEUMOC VAC/ADMIN/RCVD: CPT | Performed by: INTERNAL MEDICINE

## 2022-04-19 ASSESSMENT — ENCOUNTER SYMPTOMS
COUGH: 0
APNEA: 0
CHEST TIGHTNESS: 0
SHORTNESS OF BREATH: 0
CHOKING: 0
ABDOMINAL DISTENTION: 0

## 2022-04-19 NOTE — TELEPHONE ENCOUNTER
Pt is scheduled for prostate surgery on 6/7/22 and the surgeon is requesting that he be off Eliquis for 1 week prior and 2 weeks after surgery. He would also like the pt have the DCCV before his surgery. Per UL, schedule DCCV 3 weeks after pt started Eliquis (started on 4/8/22). Scheduled DCCV for 5/2/22 at 0900 with Dr. Roberta Anthony. Reviewed instructions with pt, will email as well. Pt is aware that he will need to remain on Eliquis for 4 weeks after DCCV. This works out perfectly for when he needs to stop Eliquis for prostate surgery (stop Eliquis on 5/31).

## 2022-04-19 NOTE — LETTER
[facility]  EP Procedure Sheet  4/19/22           [id number]    Mickey Torrez  1940    Physician: Dr. Jyotsna Salmeron    EP Procedures   Pacemaker implant  EP Study    ICD implant  Atrial flutter ablation     Biv implant  Atrial fibrillation ablation    Generator Change  SVT ablation    Lead revision  VT ablation    Lead extraction +/- upgrade  AVN ablation    Loop implant x Cardioversion     Other:   TINO     Equipment   [employer]tronic   MARGO Mapping System    [place]  [address] [address]  CryoAblation    Biotronik  Laser Lead Extraction    Special Equipment       EP Procedures Scheduling Request  Time Requested  0900   Specific Day 5/2/22   Anesthesia    CT surgery backup    Location [facility]     Pre-Procedure Labs / Imaging   PT/INR  Type & cross    CBC  Units PRBC    BMP/Mg  Units FFP    Venogram  CXR    Echo  Pulmonary CTA for Pulmonary vein mapping

## 2022-04-19 NOTE — PROGRESS NOTES
Subjective:      Patient ID: Ana Mendoza is a [de-identified] y.o. male. HPI  Follow up for afib/cad/cabg/chf/bradycardia. No cp/sob. No palp/tachy. No syncope. No edema. No pnd or orthopnea.   No bleeding issue.s     Past Medical History:   Diagnosis Date    Contact dermatitis and other eczema, due to unspecified cause     Coronary atherosclerosis of unspecified type of vessel, native or graft     Cardio- Dr. Germain Mueller Diastolic CHF [facility])     Gout, unspecified     Hypertension     Hypertrophy of prostate without urinary obstruction and other lower urinary tract symptoms (LUTS)     Other and unspecified hyperlipidemia     Peripheral vascular complications      Past Surgical History:   Procedure Laterality Date    CARDIAC SURGERY      4 vessel cabg Dr. Williams Wells y    COLONOSCOPY  07    Dr. Drury Ormond 2012    COLONOSCOPY  5.23.13    Madalyn Del Toro MD polyp repeat 2018    COLONOSCOPY  2018    Dr. Leroy Hope polyp removed no repeat necessary   [address]      Dr. Faustino Dean History     Socioeconomic History    Marital status:      Spouse name: Not on file    Number of children: Not on file    Years of education: Not on file    Highest education level: Not on file   Occupational History    Not on file   Tobacco Use    Smoking status: Former Smoker     Packs/day: 1.50     Years: 50.00     Pack years: 75.00     Types: Cigarettes     Quit date: 2003     Years since quittin.3    Smokeless tobacco: Former User   Substance and Sexual Activity    Alcohol use: Yes     Comment: Seldom    Drug use: No    Sexual activity: Yes     Partners: Female   Other Topics Concern    Not on file   Social History Narrative    Not on file     Social Determinants of Health     Financial Resource Strain: Low Risk     Difficulty of Paying Living Expenses: Not hard at all   Food Insecurity: No Food Insecurity    Worried About [address] in the Last Year: Never true  Ran Out of Food in the Last Year: Never true   Transportation Needs:     Lack of Transportation (Medical): Not on file    Lack of Transportation (Non-Medical): Not on file   Physical Activity:     Days of Exercise per Week: Not on file    Minutes of Exercise per Session: Not on file   Stress:     Feeling of Stress : Not on file   Social Connections:     Frequency of Communication with Friends and Family: Not on file    Frequency of Social Gatherings with Friends and Family: Not on file    Attends Yarsani Services: Not on file    Active Member of [address] [place] [address] or Organizations: Not on file    Attends Club or Organization Meetings: Not on file    Marital Status: Not on file   Intimate Partner Violence:     Fear of Current or Ex-Partner: Not on file    Emotionally Abused: Not on file    Physically Abused: Not on file    Sexually Abused: Not on file   Housing Stability:     Unable to Pay for Housing in the Last Year: Not on file    Number of [place] in the Last Year: Not on file    Unstable Housing in the Last Year: Not on file     FH reviewed    Vitals:    04/19/22 1119   BP: (!) 152/80   Pulse: 72         Review of Systems   Constitutional: Negative for activity change and fatigue. Respiratory: Negative for apnea, cough, choking, chest tightness and shortness of breath. Cardiovascular: Negative for chest pain, palpitations and leg swelling. No PND or orthopnea. No tachycardia. Gastrointestinal: Negative for abdominal distention. Musculoskeletal: Negative for myalgias. Neurological: Negative for dizziness, syncope and light-headedness. Psychiatric/Behavioral: Negative for agitation, behavioral problems and confusion. All other systems reviewed and are negative. Objective:   Physical Exam  Constitutional:       General: He is not in acute distress. Appearance: Normal appearance. He is well-developed. HENT:      Head: Normocephalic and atraumatic.       Right Ear: External ear normal.      Left Ear: External ear normal.   Neck:      Vascular: No JVD. Cardiovascular:      Rate and Rhythm: Normal rate and regular rhythm. Heart sounds: Normal heart sounds. No gallop. Pulmonary:      Effort: Pulmonary effort is normal. No respiratory distress. Breath sounds: Normal breath sounds. No wheezing or rales. Abdominal:      General: Bowel sounds are normal.      Palpations: Abdomen is soft. Tenderness: There is no abdominal tenderness. Musculoskeletal:         General: Normal range of motion. Cervical back: Normal range of motion and neck supple. Skin:     General: Skin is warm and dry. Neurological:      General: No focal deficit present. Mental Status: He is alert and oriented to person, place, and time. Psychiatric:         Mood and Affect: Mood normal.         Behavior: Behavior normal.         Assessment:       Diagnosis Orders   1. Persistent atrial fibrillation ([address]     2. Chronic diastolic congestive heart failure ([address]     3. Hx of CABG     4. CAD in native artery     5. Bradycardia             Plan:       EP following. No cp. CHF compensated. On AC , no bleeding issues. Continue norvasc/lipitor/HCTZ/Imdur/Lasix for CHF/afib/CAD. On femotidine and chest feels good. EP following. Rec ECV. No changes. Continue to monitor. Follow up 6-8 wks. Needs Laser tx of prostate. May be beneficial to have EP tend to rhythm. Avoid interupting AC.          Jean-Paul Lopez MD

## 2022-04-20 ENCOUNTER — PREP FOR PROCEDURE (OUTPATIENT)
Dept: CARDIOLOGY CLINIC | Age: 82
End: 2022-04-20

## 2022-04-20 RX ORDER — SODIUM CHLORIDE 0.9 % (FLUSH) 0.9 %
5-40 SYRINGE (ML) INJECTION PRN
Status: CANCELLED | OUTPATIENT
Start: 2022-04-20

## 2022-04-20 RX ORDER — SODIUM CHLORIDE 0.9 % (FLUSH) 0.9 %
5-40 SYRINGE (ML) INJECTION EVERY 12 HOURS SCHEDULED
Status: CANCELLED | OUTPATIENT
Start: 2022-04-20

## 2022-04-20 RX ORDER — SODIUM CHLORIDE 9 MG/ML
INJECTION, SOLUTION INTRAVENOUS PRN
Status: CANCELLED | OUTPATIENT
Start: 2022-04-20

## 2022-04-20 RX ORDER — MIDAZOLAM HYDROCHLORIDE 1 MG/ML
1 INJECTION INTRAMUSCULAR; INTRAVENOUS ONCE
Status: CANCELLED | OUTPATIENT
Start: 2022-05-02

## 2022-04-29 ENCOUNTER — TELEPHONE (OUTPATIENT)
Dept: CARDIOLOGY CLINIC | Age: 82
End: 2022-04-29

## 2022-04-29 NOTE — TELEPHONE ENCOUNTER
Pt is scheduled for DCCV on 5/2. Spoke with Elan Montiel at the cath lab and date was corrected. Sabino Goncalves aware that DCCV is as planned on 5/2 at 0900, arriving at 18.

## 2022-04-29 NOTE — TELEPHONE ENCOUNTER
Patient can't do cardioversion on 5-5-22. Patient thought it was on 5-2-22. patient having prostate surgery in June. Patient can only do mondays and tuesdays. Please call patients figueroa Concepcion at [phone]  The available dates for patient is 5-2-22 ,5-3-22, 5-9-22 or 10th. Or 16 th or 17th.

## 2022-05-02 ENCOUNTER — HOSPITAL ENCOUNTER (OUTPATIENT)
Dept: CARDIAC CATH/INVASIVE PROCEDURES | Age: 82
Discharge: HOME | End: 2022-05-04
Payer: COMMERCIAL

## 2022-05-02 VITALS — BODY MASS INDEX: 31.39 KG/M2 | WEIGHT: 200 LBS | HEIGHT: 67 IN | TEMPERATURE: 97.6 F

## 2022-05-02 LAB
ANION GAP SERPL CALCULATED.3IONS-SCNC: 14 MMOL/L (ref 3–16)
BUN BLDV-MCNC: 18 MG/DL (ref 7–20)
CALCIUM SERPL-MCNC: 9.8 MG/DL (ref 8.3–10.6)
CHLORIDE BLD-SCNC: 101 MMOL/L (ref 99–110)
CO2: 25 MMOL/L (ref 21–32)
CREAT SERPL-MCNC: 1.4 MG/DL (ref 0.8–1.3)
EKG ATRIAL RATE: 202 BPM
EKG ATRIAL RATE: 73 BPM
EKG DIAGNOSIS: NORMAL
EKG DIAGNOSIS: NORMAL
EKG Q-T INTERVAL: 436 MS
EKG Q-T INTERVAL: 442 MS
EKG QRS DURATION: 94 MS
EKG QRS DURATION: 96 MS
EKG QTC CALCULATION (BAZETT): 409 MS
EKG QTC CALCULATION (BAZETT): 448 MS
EKG R AXIS: 42 DEGREES
EKG R AXIS: 61 DEGREES
EKG T AXIS: -18 DEGREES
EKG T AXIS: -4 DEGREES
EKG VENTRICULAR RATE: 53 BPM
EKG VENTRICULAR RATE: 62 BPM
GFR AFRICAN AMERICAN: 59
GFR NON-AFRICAN AMERICAN: 49
GLUCOSE BLD-MCNC: 108 MG/DL (ref 70–99)
INR BLD: 1.55 (ref 0.88–1.12)
POTASSIUM SERPL-SCNC: 3.9 MMOL/L (ref 3.5–5.1)
PROTHROMBIN TIME: 17.8 SEC (ref 9.9–12.7)
SODIUM BLD-SCNC: 140 MMOL/L (ref 136–145)

## 2022-05-02 PROCEDURE — 80048 BASIC METABOLIC PNL TOTAL CA: CPT

## 2022-05-02 PROCEDURE — 93010 ELECTROCARDIOGRAM REPORT: CPT | Performed by: INTERNAL MEDICINE

## 2022-05-02 PROCEDURE — 93005 ELECTROCARDIOGRAM TRACING: CPT | Performed by: INTERNAL MEDICINE

## 2022-05-02 PROCEDURE — 85610 PROTHROMBIN TIME: CPT

## 2022-05-02 PROCEDURE — 92960 CARDIOVERSION ELECTRIC EXT: CPT | Performed by: INTERNAL MEDICINE

## 2022-05-02 PROCEDURE — 92960 CARDIOVERSION ELECTRIC EXT: CPT

## 2022-05-02 RX ORDER — SODIUM CHLORIDE 0.9 % (FLUSH) 0.9 %
5-40 SYRINGE (ML) INJECTION EVERY 12 HOURS SCHEDULED
Status: DISCONTINUED | OUTPATIENT
Start: 2022-05-02 | End: 2022-05-05 | Stop reason: HOSPADM

## 2022-05-02 RX ORDER — SODIUM CHLORIDE 0.9 % (FLUSH) 0.9 %
5-40 SYRINGE (ML) INJECTION PRN
Status: DISCONTINUED | OUTPATIENT
Start: 2022-05-02 | End: 2022-05-05 | Stop reason: HOSPADM

## 2022-05-02 RX ORDER — SODIUM CHLORIDE 9 MG/ML
INJECTION, SOLUTION INTRAVENOUS PRN
Status: DISCONTINUED | OUTPATIENT
Start: 2022-05-02 | End: 2022-05-05 | Stop reason: HOSPADM

## 2022-05-02 NOTE — H&P
[facility]   H+P  Date: 4/7/2022  Reason for Consultation:  AF  Consult Requesting Physician: No att. providers found      Chief Complaint:       Chief Complaint   Patient presents with    Atrial Fibrillation for CV      HPI: Sumanth Chavez is a [de-identified] y.o. male with PMH significant for CAD, s/p CABG (2003), HTN, HLD, HFpEF, follows Dr. Malgorzata Light. In 3/2022, admitted for CHF exacerbation, found to be in AF with slow ventricular response, rate down to 32 bpm while sleeping and pauses <2.5 sec, asymptomatic, started on Eliquis 5 mg bid 4/7/22. Preserved LVEF with LAE (4.6 cm).   Outpatient monitor showed 100% AF with average HR 61 (), 6 pauses up to 2.8 sec, and 3 runs of NSVT (longest 10 beats). Denies complaints of palpitations, dizziness, CP, SOB, orthopnea, presyncope, or syncope. Duration of AF is unclear. Pt thinks he has had it for 20 yrs; however, no previous cardiology notes address it and he was not on [address] prior to recent hospitalization.   He is planning on having prostate surgery on 6/7/22 and surgeon has asked that he be off Eliquis for 1 week prior and 1 week after.        Past Medical History        Past Medical History:   Diagnosis Date    Contact dermatitis and other eczema, due to unspecified cause      Coronary atherosclerosis of unspecified type of vessel, native or graft       Cardio- Dr. Osman Shepherd Diastolic CHF [facility])      Gout, unspecified      Hypertension      Hypertrophy of prostate without urinary obstruction and other lower urinary tract symptoms (LUTS)      Other and unspecified hyperlipidemia      Peripheral vascular complications              Past Surgical History         Past Surgical History:   Procedure Laterality Date    CARDIAC SURGERY         4 vessel cabg Dr. Tara Perez y    COLONOSCOPY   2/16/07     Dr. Sherita Lizarraga 2/2012    COLONOSCOPY   5.23.13     Anders Marrero MD polyp repeat 2018    COLONOSCOPY   06/18/2018     Dr. Salma Rivera polyp removed no repeat necessary    CORONARY ARTERY BYPASS GRAFT   2003     Dr. Roberta Rodriguez            Allergies: Allergies   Allergen Reactions    Sulfa Antibiotics           Medication:   Home Medications           Prior to Admission medications    Medication Sig Start Date End Date Taking? Authorizing Provider   aspirin 325 MG tablet Take 325 mg by mouth daily     Yes Historical Provider, MD   furosemide (LASIX) 40 MG tablet Take 1 tablet by mouth daily 3/19/22   Yes Albert Norris MD   famotidine (PEPCID) 20 MG tablet Take 1 tablet by mouth 2 times daily 3/19/22   Yes Albert Norris MD   polyethylene glycol (GLYCOLAX) 17 g packet Take 17 g by mouth daily as needed for Constipation 3/19/22 4/18/22 Yes Albert Norris MD   isosorbide mononitrate (IMDUR) 60 MG extended release tablet Take 60 mg by mouth in the morning and at bedtime     Yes Historical Provider, MD   tamsulosin (FLOMAX) 0.4 MG capsule Take 0.4 mg by mouth daily     Yes Historical Provider, MD   atorvastatin (LIPITOR) 40 MG tablet Take 1 tablet by mouth daily 2/23/22   Yes Jaime Zhang MD   allopurinol (ZYLOPRIM) 300 MG tablet TAKE ONE TABLET BY MOUTH DAILY 2/23/22   Yes Jaime Zhang MD   amLODIPine (NORVASC) 2.5 MG tablet Take 1 tablet by mouth daily 2/23/22   Yes Jaime Zhang MD   colchicine (COLCRYS) 0.6 MG tablet TAKE ONE TABLET BY MOUTH DAILY 8/24/20   Yes Jaime Zhang MD   vitamin D (CHOLECALCIFEROL) 1000 UNIT TABS tablet Take 2,000 Int'l Units by mouth daily.     Yes Historical Provider, MD   finasteride (PROSCAR) 5 MG tablet Take 5 mg by mouth daily.     Yes Historical Provider, MD            Social History:   reports that he quit smoking about 19 years ago. His smoking use included cigarettes. He has a 75.00 pack-year smoking history. He has quit using smokeless tobacco. He reports current alcohol use. He reports that he does not use drugs.         Family History:  family history includes Coronary Art Dis in his father. Reviewed. Denies family history of sudden cardiac death, arrhythmia, premature CAD     Review of System:     · General ROS: negative for - chills, fever   · Psychological ROS: negative for - anxiety or depression  · Ophthalmic ROS: negative for - eye pain or loss of vision  · ENT ROS: negative for - epistaxis, headaches, nasal discharge, sore throat   · Allergy and Immunology ROS: negative for - hives, nasal congestion   · Hematological and Lymphatic ROS: negative for - bleeding problems, blood clots, bruising or jaundice  · Endocrine ROS: negative for - skin changes, temperature intolerance or unexpected weight changes  · Respiratory ROS: negative for - cough, hemoptysis, pleuritic pain, SOB, sputum changes or wheezing  · Cardiovascular ROS: Per HPI. · Gastrointestinal ROS: negative for - abdominal pain, blood in stools, diarrhea, hematemesis, melena, nausea/vomiting or swallowing difficulty/pain  · Genito-Urinary ROS: negative for - dysuria or incontinence  · Musculoskeletal ROS: negative for - joint swelling or muscle pain  · Neurological ROS: negative for - confusion, dizziness, gait disturbance, headaches, numbness/tingling, seizures, speech problems, tremors, visual changes or weakness  · Dermatological ROS: negative for - rash     Physical Examination:      Vitals:     04/07/22 1300   BP: 130/80   Pulse: 70         · Constitutional: Oriented. No distress. · Head: atraumatic. · Mouth/Throat: Oropharynx is clear and moist.   · Eyes: Conjunctivae normal. EOM are normal.   · Neck: Normal range of motion. Neck supple. No rigidity. No JVD present. · Cardiovascular: Normal rate, irregular rhythm, S1&S2 and intact distal pulses. · Pulmonary/Chest: Bilateral respiratory sounds. No wheezes. No rhonchi. · Abdominal: Soft. Bowel sounds present. No distension, No tenderness. · Musculoskeletal: No tenderness. No edema    · Neurological: Alert and oriented. No Gross deficit   · Skin: Skin is warm and dry. No rash noted. · Psychiatric: Has a normal mood, affect and behavior      Labs:  Reviewed.      ECG: reviewed, AF 70      Studies:   1.  14 day CAM (3/18-4/1/22): 100% AF with average HR 61 (), 6 pauses up to 2.8 sec, and 3 runs of NSVT with longest 10 beats and max rate of 137 bpm     2. Echo 3/17/22:   Summary   Normal left ventricle size. There is mild to moderate concentric left ventricular hypertrophy. Overall left ventricular systolic function appears normal 55-60%. No regional wall motion abnormalities are noted.   Indeterminate diastolic function.   Mild to moderate mitral regurgitation is present.   Mild to moderate tricuspid regurgitation.   Estimated pulmonary artery systolic pressure is 50 mmHg assuming a right atrial pressure of 8 mmHg.   Inferior vena cava appears dilated.  IVC respiratory change in dimension < 50%.     3. Stress Test 6/21/19:    Summary    Overall findings represent a low risk scan.    Normal LV size and systolic function.    Small fixed defect involving the apex, otherwise no evidence of ischemia.    ECG: Non-diagnostic EKG response due to baseline abnormalities.    Spoke in great detail to Lin[place] the Son. The proc, its risks, benefits, alternatives and imponderable reviewed. He reviewed with his Father who had no further questions. There were many questions about surgery, CV, and further plans with afib. Advised that he will be seen by Dr. Jimmy Ashby to discuss future strategy. Surgery has decided to stop [facility] for 2 weeks post 4 weeks after CV and the risks reviewed. Follow up / Josselyn Carrera:  1. Persistent AF SVR, on Eliquis  2. Bradycardia  3. CAD, s/p CABG, on ASA  4. HTN, stable on amlodipine  5. HLD, on statin  6. TR / MR, mild/moderate     Plan:  1. Decrease ASA to 81 mg daily  2. Start Eliquis 5 mg BID today  3. DCCV arranged for me by staff today. He needs to be on uninterrupted Eliquis 4 weeks after DCCV.   As his prostate surgery is for 6/7/22 thus 4 weeks till then. 3.  Follow up 1 mo after the DCCV    Mallampati:2  ASA:2    Rayne Gold.  MD Vargas

## 2022-05-02 NOTE — OP NOTE
Operative Note      Patient: Sumanth Chavez  YOB: 1940  MRN: [id number]    Date of Procedure: CV    Pre-Op Diagnosis: atrial fibrillation    Post-Op Diagnosis: Same       Anesthesia: brevital 45 mg    Complications: None    Findings: atrial fib to sinus rhythm    Detailed Description of Procedure:   Under 45 mg brevital anesthesia, 200 j ant/post syn shock delivered and sinus rhythm resulted without apparent complications. Meds and follow up reviewed. All questions answered via Son and patient did not feel need for  other than Son.      Electronically signed by Victor Baumgarten, MD on 5/2/2022 at 9:12 AM

## 2022-05-05 ENCOUNTER — TELEPHONE (OUTPATIENT)
Dept: INTERNAL MEDICINE CLINIC | Age: 82
End: 2022-05-05

## 2022-05-05 NOTE — TELEPHONE ENCOUNTER
Following up on faxes for signed orders sent 04/21 and 04/28. Order for post hospital order SN as of 03/22/22 until 04/17/22. HHA for personal care services.     Second order - patient to be recertified to be continuing need for HHA services    And one Plan of Care    Please call and advise

## 2022-05-16 DIAGNOSIS — I10 PRIMARY HYPERTENSION: ICD-10-CM

## 2022-05-17 RX ORDER — AMLODIPINE BESYLATE 2.5 MG/1
2.5 TABLET ORAL DAILY
Qty: 90 TABLET | Refills: 1 | Status: SHIPPED | OUTPATIENT
Start: 2022-05-17 | End: 2022-11-02 | Stop reason: SDUPTHER

## 2022-05-31 ENCOUNTER — OFFICE VISIT (OUTPATIENT)
Dept: CARDIOLOGY CLINIC | Age: 82
End: 2022-05-31
Payer: COMMERCIAL

## 2022-05-31 VITALS
SYSTOLIC BLOOD PRESSURE: 134 MMHG | HEART RATE: 88 BPM | WEIGHT: 199 LBS | BODY MASS INDEX: 31.17 KG/M2 | DIASTOLIC BLOOD PRESSURE: 86 MMHG

## 2022-05-31 DIAGNOSIS — I50.32 CHRONIC DIASTOLIC CONGESTIVE HEART FAILURE (HCC): ICD-10-CM

## 2022-05-31 DIAGNOSIS — I25.10 CAD IN NATIVE ARTERY: ICD-10-CM

## 2022-05-31 DIAGNOSIS — Z95.1 HX OF CABG: ICD-10-CM

## 2022-05-31 DIAGNOSIS — R00.1 BRADYCARDIA: ICD-10-CM

## 2022-05-31 DIAGNOSIS — I48.19 PERSISTENT ATRIAL FIBRILLATION (HCC): Primary | ICD-10-CM

## 2022-05-31 PROCEDURE — G8427 DOCREV CUR MEDS BY ELIG CLIN: HCPCS | Performed by: INTERNAL MEDICINE

## 2022-05-31 PROCEDURE — 1123F ACP DISCUSS/DSCN MKR DOCD: CPT | Performed by: INTERNAL MEDICINE

## 2022-05-31 PROCEDURE — 99214 OFFICE O/P EST MOD 30 MIN: CPT | Performed by: INTERNAL MEDICINE

## 2022-05-31 PROCEDURE — G8417 CALC BMI ABV UP PARAM F/U: HCPCS | Performed by: INTERNAL MEDICINE

## 2022-05-31 PROCEDURE — 93000 ELECTROCARDIOGRAM COMPLETE: CPT | Performed by: INTERNAL MEDICINE

## 2022-05-31 PROCEDURE — 1036F TOBACCO NON-USER: CPT | Performed by: INTERNAL MEDICINE

## 2022-05-31 ASSESSMENT — ENCOUNTER SYMPTOMS
CHEST TIGHTNESS: 0
ABDOMINAL DISTENTION: 0
SHORTNESS OF BREATH: 0
COUGH: 0
CHOKING: 0
APNEA: 0

## 2022-05-31 NOTE — PROGRESS NOTES
Subjective:      Patient ID: Mickey Torrez is a [de-identified] y.o. male. HPI  Follow up for afib/cad/cabg/chf/bradycardia. No cp/sob. No palp/tachy. Had ECV per EP. No syncope. No edema. No pnd or orthopnea.   No bleeding issue.s     Past Medical History:   Diagnosis Date    Contact dermatitis and other eczema, due to unspecified cause     Coronary atherosclerosis of unspecified type of vessel, native or graft     Cardio- Dr. Raul Mcneil Diastolic CHF [facility])     Gout, unspecified     Hypertension     Hypertrophy of prostate without urinary obstruction and other lower urinary tract symptoms (LUTS)     Other and unspecified hyperlipidemia     Peripheral vascular complications      Past Surgical History:   Procedure Laterality Date    CARDIAC SURGERY      4 vessel cabg Dr. Jair Mcdonald y    COLONOSCOPY  07    Dr. Khari Hernandez 2012    COLONOSCOPY  5.23.13    Nahid Davidson MD polyp repeat 2018    COLONOSCOPY  2018    Dr. Heron Vail polyp removed no repeat necessary   [address]      Dr. Lopez Roman History     Socioeconomic History    Marital status:      Spouse name: Not on file    Number of children: Not on file    Years of education: Not on file    Highest education level: Not on file   Occupational History    Not on file   Tobacco Use    Smoking status: Former Smoker     Packs/day: 1.50     Years: 50.00     Pack years: 75.00     Types: Cigarettes     Quit date: 2003     Years since quittin.4    Smokeless tobacco: Former User   Substance and Sexual Activity    Alcohol use: Yes     Comment: Seldom    Drug use: No    Sexual activity: Yes     Partners: Female   Other Topics Concern    Not on file   Social History Narrative    Not on file     Social Determinants of Health     Financial Resource Strain: Low Risk     Difficulty of Paying Living Expenses: Not hard at all   Food Insecurity: No Food Insecurity    Worried About [address] in the Last Year: Never true    Ran Out of Food in the Last Year: Never true   Transportation Needs:     Lack of Transportation (Medical): Not on file    Lack of Transportation (Non-Medical): Not on file   Physical Activity:     Days of Exercise per Week: Not on file    Minutes of Exercise per Session: Not on file   Stress:     Feeling of Stress : Not on file   Social Connections:     Frequency of Communication with Friends and Family: Not on file    Frequency of Social Gatherings with Friends and Family: Not on file    Attends Congregational Services: Not on file    Active Member of [address] [place] [address] [employer] or Organizations: Not on file    Attends Club or Organization Meetings: Not on file    Marital Status: Not on file   Intimate Partner Violence:     Fear of Current or Ex-Partner: Not on file    Emotionally Abused: Not on file    Physically Abused: Not on file    Sexually Abused: Not on file   Housing Stability:     Unable to Pay for Housing in the Last Year: Not on file    Number of [place] in the Last Year: Not on file    Unstable Housing in the Last Year: Not on file     FH reviewed    Vitals:    05/31/22 1100   BP: 134/86   Pulse: 88         Review of Systems   Constitutional: Negative for activity change and fatigue. Respiratory: Negative for apnea, cough, choking, chest tightness and shortness of breath. Cardiovascular: Negative for chest pain, palpitations and leg swelling. No PND or orthopnea. No tachycardia. Gastrointestinal: Negative for abdominal distention. Musculoskeletal: Negative for myalgias. Neurological: Negative for dizziness, syncope and light-headedness. Psychiatric/Behavioral: Negative for agitation, behavioral problems and confusion. All other systems reviewed and are negative. Objective:   Physical Exam  Constitutional:       General: He is not in acute distress. Appearance: Normal appearance. He is well-developed. HENT:      Head: Normocephalic and atraumatic. Right Ear: External ear normal.      Left Ear: External ear normal.   Neck:      Vascular: No JVD. Cardiovascular:      Rate and Rhythm: Normal rate and regular rhythm. Heart sounds: Normal heart sounds. No gallop. Pulmonary:      Effort: Pulmonary effort is normal. No respiratory distress. Breath sounds: Normal breath sounds. No wheezing or rales. Abdominal:      General: Bowel sounds are normal.      Palpations: Abdomen is soft. Tenderness: There is no abdominal tenderness. Musculoskeletal:         General: Normal range of motion. Cervical back: Normal range of motion and neck supple. Skin:     General: Skin is warm and dry. Neurological:      General: No focal deficit present. Mental Status: He is alert and oriented to person, place, and time. Psychiatric:         Mood and Affect: Mood normal.         Behavior: Behavior normal.         Assessment:       Diagnosis Orders   1. Persistent atrial fibrillation ([address]     2. Chronic diastolic congestive heart failure ([address]     3. CAD in native artery     4. Hx of CABG     5. Bradycardia             Plan:      EP following. Now s/p ECV. No cp. CHF compensated. On AC , no bleeding issues. Continue norvasc/lipitor/HCTZ/Imdur/Lasix for CHF/afib/CAD. EP following. No changes. Continue to monitor. Follow up 3 months. Needs Laser tx of prostate.          Filiberto Mckeon MD

## 2022-06-24 ENCOUNTER — PATIENT MESSAGE (OUTPATIENT)
Dept: INTERNAL MEDICINE CLINIC | Age: 82
End: 2022-06-24

## 2022-06-24 DIAGNOSIS — I10 ESSENTIAL HYPERTENSION: Primary | ICD-10-CM

## 2022-06-24 DIAGNOSIS — N40.1 BENIGN NON-NODULAR PROSTATIC HYPERPLASIA WITH LOWER URINARY TRACT SYMPTOMS: ICD-10-CM

## 2022-06-24 DIAGNOSIS — R73.9 HYPERGLYCEMIA: ICD-10-CM

## 2022-06-24 DIAGNOSIS — E78.2 MIXED HYPERLIPIDEMIA: ICD-10-CM

## 2022-06-24 NOTE — TELEPHONE ENCOUNTER
From: Maria Teresa Caban  To: Dr. Metcalf Raw: 6/24/2022 12:06 PM EDT  Subject: Order for blood test    Dear Dr. Myriam Bravo,  before the appointment with you  I need an order for a complete blood test (including PSA, A1C, TSH).     Maria Teresa Caban

## 2022-07-01 DIAGNOSIS — E78.2 MIXED HYPERLIPIDEMIA: ICD-10-CM

## 2022-07-01 DIAGNOSIS — R73.9 HYPERGLYCEMIA: ICD-10-CM

## 2022-07-01 DIAGNOSIS — N40.1 BENIGN NON-NODULAR PROSTATIC HYPERPLASIA WITH LOWER URINARY TRACT SYMPTOMS: ICD-10-CM

## 2022-07-01 DIAGNOSIS — I10 ESSENTIAL HYPERTENSION: ICD-10-CM

## 2022-07-01 LAB
A/G RATIO: 1.8 (ref 1.1–2.2)
ALBUMIN SERPL-MCNC: 4 G/DL (ref 3.4–5)
ALP BLD-CCNC: 89 U/L (ref 40–129)
ALT SERPL-CCNC: 16 U/L (ref 10–40)
ANION GAP SERPL CALCULATED.3IONS-SCNC: 13 MMOL/L (ref 3–16)
AST SERPL-CCNC: 20 U/L (ref 15–37)
BASOPHILS ABSOLUTE: 0 K/UL (ref 0–0.2)
BASOPHILS RELATIVE PERCENT: 0.3 %
BILIRUB SERPL-MCNC: 0.7 MG/DL (ref 0–1)
BUN BLDV-MCNC: 17 MG/DL (ref 7–20)
CALCIUM SERPL-MCNC: 9.5 MG/DL (ref 8.3–10.6)
CHLORIDE BLD-SCNC: 98 MMOL/L (ref 99–110)
CHOLESTEROL, TOTAL: 156 MG/DL (ref 0–199)
CO2: 28 MMOL/L (ref 21–32)
CREAT SERPL-MCNC: 1.2 MG/DL (ref 0.8–1.3)
EOSINOPHILS ABSOLUTE: 0.2 K/UL (ref 0–0.6)
EOSINOPHILS RELATIVE PERCENT: 3.4 %
GFR AFRICAN AMERICAN: >60
GFR NON-AFRICAN AMERICAN: 58
GLUCOSE BLD-MCNC: 98 MG/DL (ref 70–99)
HCT VFR BLD CALC: 40.2 % (ref 40.5–52.5)
HDLC SERPL-MCNC: 41 MG/DL (ref 40–60)
HEMOGLOBIN: 13.4 G/DL (ref 13.5–17.5)
LDL CHOLESTEROL CALCULATED: 86 MG/DL
LYMPHOCYTES ABSOLUTE: 1.3 K/UL (ref 1–5.1)
LYMPHOCYTES RELATIVE PERCENT: 18.3 %
MCH RBC QN AUTO: 28.2 PG (ref 26–34)
MCHC RBC AUTO-ENTMCNC: 33.3 G/DL (ref 31–36)
MCV RBC AUTO: 84.8 FL (ref 80–100)
MONOCYTES ABSOLUTE: 0.7 K/UL (ref 0–1.3)
MONOCYTES RELATIVE PERCENT: 9.3 %
NEUTROPHILS ABSOLUTE: 4.9 K/UL (ref 1.7–7.7)
NEUTROPHILS RELATIVE PERCENT: 68.7 %
PDW BLD-RTO: 17.4 % (ref 12.4–15.4)
PLATELET # BLD: 208 K/UL (ref 135–450)
PMV BLD AUTO: 8.4 FL (ref 5–10.5)
POTASSIUM SERPL-SCNC: 4 MMOL/L (ref 3.5–5.1)
PROSTATE SPECIFIC ANTIGEN: 0.04 NG/ML (ref 0–4)
RBC # BLD: 4.74 M/UL (ref 4.2–5.9)
SODIUM BLD-SCNC: 139 MMOL/L (ref 136–145)
TOTAL PROTEIN: 6.2 G/DL (ref 6.4–8.2)
TRIGL SERPL-MCNC: 145 MG/DL (ref 0–150)
TSH REFLEX: 1.99 UIU/ML (ref 0.27–4.2)
VLDLC SERPL CALC-MCNC: 29 MG/DL
WBC # BLD: 7.2 K/UL (ref 4–11)

## 2022-07-02 LAB
ESTIMATED AVERAGE GLUCOSE: 128.4 MG/DL
HBA1C MFR BLD: 6.1 %

## 2022-07-29 RX ORDER — HYDROCHLOROTHIAZIDE 25 MG/1
25 TABLET ORAL DAILY
Qty: 90 TABLET | Refills: 1 | Status: SHIPPED | OUTPATIENT
Start: 2022-07-29 | End: 2022-11-02 | Stop reason: SDUPTHER

## 2022-08-19 ENCOUNTER — OFFICE VISIT (OUTPATIENT)
Dept: INTERNAL MEDICINE CLINIC | Age: 82
End: 2022-08-19
Payer: COMMERCIAL

## 2022-08-19 VITALS
DIASTOLIC BLOOD PRESSURE: 78 MMHG | OXYGEN SATURATION: 96 % | RESPIRATION RATE: 14 BRPM | BODY MASS INDEX: 33.3 KG/M2 | HEART RATE: 86 BPM | WEIGHT: 212.2 LBS | SYSTOLIC BLOOD PRESSURE: 122 MMHG | TEMPERATURE: 97.1 F | HEIGHT: 67 IN

## 2022-08-19 DIAGNOSIS — I48.19 PERSISTENT ATRIAL FIBRILLATION (HCC): ICD-10-CM

## 2022-08-19 DIAGNOSIS — Z00.00 WELL ADULT EXAM: Primary | ICD-10-CM

## 2022-08-19 DIAGNOSIS — M1A.9XX0 CHRONIC GOUT WITHOUT TOPHUS, UNSPECIFIED CAUSE, UNSPECIFIED SITE: ICD-10-CM

## 2022-08-19 DIAGNOSIS — I25.118 ATHEROSCLEROSIS OF NATIVE CORONARY ARTERY OF NATIVE HEART WITH OTHER FORM OF ANGINA PECTORIS (HCC): ICD-10-CM

## 2022-08-19 DIAGNOSIS — I50.41 ACUTE COMBINED SYSTOLIC AND DIASTOLIC CONGESTIVE HEART FAILURE (HCC): ICD-10-CM

## 2022-08-19 DIAGNOSIS — I50.33 ACUTE ON CHRONIC DIASTOLIC HEART FAILURE (HCC): ICD-10-CM

## 2022-08-19 DIAGNOSIS — E78.2 MIXED HYPERLIPIDEMIA: ICD-10-CM

## 2022-08-19 DIAGNOSIS — Z00.00 MEDICARE ANNUAL WELLNESS VISIT, SUBSEQUENT: ICD-10-CM

## 2022-08-19 DIAGNOSIS — N40.1 BENIGN NON-NODULAR PROSTATIC HYPERPLASIA WITH LOWER URINARY TRACT SYMPTOMS: ICD-10-CM

## 2022-08-19 DIAGNOSIS — J81.0 ACUTE PULMONARY EDEMA (HCC): ICD-10-CM

## 2022-08-19 DIAGNOSIS — Z86.010 HX OF COLONIC POLYP: ICD-10-CM

## 2022-08-19 DIAGNOSIS — I10 ESSENTIAL HYPERTENSION: ICD-10-CM

## 2022-08-19 DIAGNOSIS — R42 VERTIGO: ICD-10-CM

## 2022-08-19 DIAGNOSIS — R73.9 HYPERGLYCEMIA: ICD-10-CM

## 2022-08-19 PROCEDURE — 99214 OFFICE O/P EST MOD 30 MIN: CPT | Performed by: INTERNAL MEDICINE

## 2022-08-19 PROCEDURE — G8427 DOCREV CUR MEDS BY ELIG CLIN: HCPCS | Performed by: INTERNAL MEDICINE

## 2022-08-19 PROCEDURE — G8417 CALC BMI ABV UP PARAM F/U: HCPCS | Performed by: INTERNAL MEDICINE

## 2022-08-19 PROCEDURE — 1123F ACP DISCUSS/DSCN MKR DOCD: CPT | Performed by: INTERNAL MEDICINE

## 2022-08-19 PROCEDURE — G0439 PPPS, SUBSEQ VISIT: HCPCS | Performed by: INTERNAL MEDICINE

## 2022-08-19 PROCEDURE — 1036F TOBACCO NON-USER: CPT | Performed by: INTERNAL MEDICINE

## 2022-08-19 RX ORDER — ZOLPIDEM TARTRATE 10 MG/1
TABLET ORAL NIGHTLY PRN
COMMUNITY

## 2022-08-19 RX ORDER — HYDROCODONE BITARTRATE AND ACETAMINOPHEN 5; 325 MG/1; MG/1
1 TABLET ORAL EVERY 6 HOURS PRN
COMMUNITY

## 2022-08-19 RX ORDER — NITROGLYCERIN 0.4 MG/1
0.4 TABLET SUBLINGUAL EVERY 5 MIN PRN
Qty: 25 TABLET | Refills: 1 | Status: SHIPPED | OUTPATIENT
Start: 2022-08-19

## 2022-08-19 RX ORDER — MECLIZINE HCL 12.5 MG/1
12.5 TABLET ORAL 3 TIMES DAILY PRN
COMMUNITY

## 2022-08-19 RX ORDER — NITROGLYCERIN 0.4 MG/1
0.4 TABLET SUBLINGUAL EVERY 5 MIN PRN
COMMUNITY
End: 2022-08-19 | Stop reason: SDUPTHER

## 2022-08-19 SDOH — ECONOMIC STABILITY: FOOD INSECURITY: WITHIN THE PAST 12 MONTHS, YOU WORRIED THAT YOUR FOOD WOULD RUN OUT BEFORE YOU GOT MONEY TO BUY MORE.: NEVER TRUE

## 2022-08-19 SDOH — ECONOMIC STABILITY: FOOD INSECURITY: WITHIN THE PAST 12 MONTHS, THE FOOD YOU BOUGHT JUST DIDN'T LAST AND YOU DIDN'T HAVE MONEY TO GET MORE.: NEVER TRUE

## 2022-08-19 ASSESSMENT — SOCIAL DETERMINANTS OF HEALTH (SDOH): HOW HARD IS IT FOR YOU TO PAY FOR THE VERY BASICS LIKE FOOD, HOUSING, MEDICAL CARE, AND HEATING?: NOT HARD AT ALL

## 2022-08-19 ASSESSMENT — ENCOUNTER SYMPTOMS
EYES NEGATIVE: 1
VOMITING: 0
ALLERGIC/IMMUNOLOGIC NEGATIVE: 1
CONSTIPATION: 1
BACK PAIN: 1
RESPIRATORY NEGATIVE: 1

## 2022-08-19 ASSESSMENT — PATIENT HEALTH QUESTIONNAIRE - PHQ9
SUM OF ALL RESPONSES TO PHQ QUESTIONS 1-9: 0
2. FEELING DOWN, DEPRESSED OR HOPELESS: 0
1. LITTLE INTEREST OR PLEASURE IN DOING THINGS: 0
SUM OF ALL RESPONSES TO PHQ9 QUESTIONS 1 & 2: 0
SUM OF ALL RESPONSES TO PHQ QUESTIONS 1-9: 0

## 2022-08-19 ASSESSMENT — LIFESTYLE VARIABLES: HOW OFTEN DO YOU HAVE A DRINK CONTAINING ALCOHOL: MONTHLY OR LESS

## 2022-08-19 NOTE — ASSESSMENT & PLAN NOTE
Within normal limits for age retiredno ADL issues,immunizations up to date refuses Flu , no depression ,no cognitive impairment  Colonoscopy up to date repeat 2018-polyp no repeat needed 2nd to age    Eye exam up to date  Exercises as tolerated    No living will but does not want resuscitation   Findings and recommendations discussed with Pt

## 2022-08-19 NOTE — ASSESSMENT & PLAN NOTE
Well-controlled, continue current medications and lifestyle modifications recommended cont to check BP occ at home

## 2022-08-19 NOTE — ASSESSMENT & PLAN NOTE
Monitored by specialist- no acute findings meriting change in the plan to see Dr Janett Vieyra next week meds as noted remains in NSR

## 2022-08-19 NOTE — ASSESSMENT & PLAN NOTE
Well-controlled, continue current medications and lifestyle modifications recommended cont meds and diet Repeat labs in 6 months

## 2022-08-19 NOTE — PATIENT INSTRUCTIONS
Personalized Preventive Plan for Jono Decree - 8/19/2022  Medicare offers a range of preventive health benefits. Some of the tests and screenings are paid in full while other may be subject to a deductible, co-insurance, and/or copay. Some of these benefits include a comprehensive review of your medical history including lifestyle, illnesses that may run in your family, and various assessments and screenings as appropriate. After reviewing your medical record and screening and assessments performed today your provider may have ordered immunizations, labs, imaging, and/or referrals for you. A list of these orders (if applicable) as well as your Preventive Care list are included within your After Visit Summary for your review. Other Preventive Recommendations:    A preventive eye exam performed by an eye specialist is recommended every 1-2 years to screen for glaucoma; cataracts, macular degeneration, and other eye disorders. A preventive dental visit is recommended every 6 months. Try to get at least 150 minutes of exercise per week or 10,000 steps per day on a pedometer . Order or download the FREE \"Exercise & Physical Activity: Your Everyday Guide\" from [employer]. Call 4-941.487.1843 or search [employer] online. You need 3511-4520 mg of calcium and 8459-5549 IU of vitamin D per day. It is possible to meet your calcium requirement with diet alone, but a vitamin D supplement is usually necessary to meet this goal.  When exposed to the sun, use a sunscreen that protects against both UVA and UVB radiation with an SPF of 30 or greater. Reapply every 2 to 3 hours or after sweating, drying off with a towel, or swimming. Always wear a seat belt when traveling in a car. Always wear a helmet when riding a bicycle or motorcycle.

## 2022-08-19 NOTE — ASSESSMENT & PLAN NOTE
Well-controlled, continue current medications and lifestyle modifications recommended close to goal cont meds and diet Repeat labs in 6 months

## 2022-08-19 NOTE — PROGRESS NOTES
Subjective:      Patient ID: Alejandro Habermann is a [de-identified] y.o. male. HPI  Here today for medicare complete physical and review of chronic problems as listed under assessment and plan,no new c/o feels good       Hypertension  This is a chronic problem. The current episode started more than 1 year ago. The problem is unchanged. The problem is controlled. Associated symptoms include palpitations. Chest pain: occ .sx . There are no associated agents to hypertension. Risk factors for coronary artery disease include dyslipidemia, obesity and male gender. Past treatments include ACE inhibitors, diuretics, beta blockers and lifestyle changes. The current treatment provides significant improvement. There are no compliance problems. Hypertensive end-organ damage includes CAD/MI. Hyperlipidemia  This is a chronic problem. The current episode started more than 1 year ago. The problem is controlled. Recent lipid tests were reviewed and are normal. There are no known factors aggravating his hyperlipidemia. Chest pain: occ .sx . Current antihyperlipidemic treatment includes diet change, exercise and statins. The current treatment provides significant improvement of lipids. There are no compliance problems. Risk factors for coronary artery disease include dyslipidemia, male sex, obesity and hypertension (known CAD). Allergies   Allergen Reactions    Sulfa Antibiotics        Current Outpatient Medications   Medication Sig Dispense Refill    nitroGLYCERIN (NITROSTAT) 0.4 MG SL tablet Place 0.4 mg under the tongue every 5 minutes as needed for Chest pain up to max of 3 total doses. If no relief after 1 dose, call 911.      hydroCHLOROthiazide (HYDRODIURIL) 25 MG tablet Take 1 tablet by mouth in the morning.  90 tablet 1    amLODIPine (NORVASC) 2.5 MG tablet Take 1 tablet by mouth daily 90 tablet 1    aspirin EC 81 MG EC tablet Take 1 tablet by mouth daily 90 tablet 3    apixaban (ELIQUIS) 5 MG TABS tablet Take 1 tablet by mouth 2 times daily 60 tablet 5    furosemide (LASIX) 40 MG tablet Take 1 tablet by mouth daily 60 tablet 3    famotidine (PEPCID) 20 MG tablet Take 1 tablet by mouth 2 times daily 60 tablet 3    isosorbide mononitrate (IMDUR) 60 MG extended release tablet Take 60 mg by mouth in the morning and at bedtime      tamsulosin (FLOMAX) 0.4 MG capsule Take 0.4 mg by mouth daily      atorvastatin (LIPITOR) 40 MG tablet Take 1 tablet by mouth daily 90 tablet 1    allopurinol (ZYLOPRIM) 300 MG tablet TAKE ONE TABLET BY MOUTH DAILY 90 tablet 1    colchicine (COLCRYS) 0.6 MG tablet TAKE ONE TABLET BY MOUTH DAILY 90 tablet 1    vitamin D (CHOLECALCIFEROL) 1000 UNIT TABS tablet Take 2,000 Int'l Units by mouth daily. finasteride (PROSCAR) 5 MG tablet Take 5 mg by mouth daily. No current facility-administered medications for this visit.        Past Medical History:   Diagnosis Date    Contact dermatitis and other eczema, due to unspecified cause     Coronary atherosclerosis of unspecified type of vessel, native or graft     Cardio- Dr. Fadi Chamberlain    Diastolic CHF [facility])     Gout, unspecified     Hypertension     Hypertrophy of prostate without urinary obstruction and other lower urinary tract symptoms (LUTS)     Other and unspecified hyperlipidemia     Peripheral vascular complications        Family History   Problem Relation Age of Onset    Coronary Art Dis Father        Past Surgical History:   Procedure Laterality Date    CARDIAC SURGERY      4 vessel cabg Dr. Evelia Lua y    COLONOSCOPY  2/16/07    Dr. Reyes Late 2/2012    COLONOSCOPY  5.23.13    Rajani Sanches MD polyp repeat 2018    COLONOSCOPY  06/18/2018    Dr. Cary Owen polyp removed no repeat necessary    CORONARY ARTERY BYPASS GRAFT  2003    Dr. Sulaiman Duong History     Socioeconomic History    Marital status:      Spouse name: Not on file    Number of children: Not on file    Years of education: Not on file    Highest education level: Not on file   Occupational Head: Normocephalic and atraumatic. Right Ear: Tympanic membrane, ear canal and external ear normal.      Left Ear: Tympanic membrane, ear canal and external ear normal.   Eyes:      Extraocular Movements: Extraocular movements intact. Conjunctiva/sclera: Conjunctivae normal.      Pupils: Pupils are equal, round, and reactive to light. Neck:      Thyroid: No thyromegaly. Trachea: No tracheal deviation. Cardiovascular:      Rate and Rhythm: Normal rate and regular rhythm. Heart sounds: Normal heart sounds. No murmur heard. Pulmonary:      Effort: Pulmonary effort is normal.      Breath sounds: Normal breath sounds. Abdominal:      General: Abdomen is flat. Bowel sounds are normal.      Palpations: Abdomen is soft. Comments: Obese    Genitourinary:     Comments: Per Dr Manpreet Kennedy   Musculoskeletal:         General: Normal range of motion. Cervical back: Normal range of motion and neck supple. Skin:     General: Skin is warm and dry. Comments: Lipoma L axilla  Not tender    Neurological:      General: No focal deficit present. Mental Status: He is alert and oriented to person, place, and time. Mental status is at baseline. Deep Tendon Reflexes: Reflexes are normal and symmetric. Psychiatric:         Mood and Affect: Mood normal.         Behavior: Behavior normal.         Thought Content:  Thought content normal.         Judgment: Judgment normal.       Temp 97.1 °F (36.2 °C)   Ht 5' 7\" (1.702 m)   Wt 212 lb 3.2 oz (96.3 kg)   BMI 33.24 kg/m²       Assessment & Plan:         Gout   Well-controlled, continue current medications and lifestyle modifications recommended cont meds and diet Repeat labs in 6 months      Coronary atherosclerosis   Monitored by specialist- no acute findings meriting change in the plan cont with Dr Borrero Certain    Benign non-nodular prostatic hyperplasia with lower urinary tract symptoms   Monitored by specialist- no acute findings meriting change 90 tablet 1    aspirin EC 81 MG EC tablet Take 1 tablet by mouth daily 90 tablet 3    furosemide (LASIX) 40 MG tablet Take 1 tablet by mouth daily 60 tablet 3    famotidine (PEPCID) 20 MG tablet Take 1 tablet by mouth 2 times daily 60 tablet 3    isosorbide mononitrate (IMDUR) 60 MG extended release tablet Take 60 mg by mouth in the morning and at bedtime      atorvastatin (LIPITOR) 40 MG tablet Take 1 tablet by mouth daily 90 tablet 1    allopurinol (ZYLOPRIM) 300 MG tablet TAKE ONE TABLET BY MOUTH DAILY 90 tablet 1    colchicine (COLCRYS) 0.6 MG tablet TAKE ONE TABLET BY MOUTH DAILY 90 tablet 1    vitamin D (CHOLECALCIFEROL) 1000 UNIT TABS tablet Take 2,000 Int'l Units by mouth daily. HYDROcodone-acetaminophen (NORCO) 5-325 MG per tablet Take 1 tablet by mouth every 6 hours as needed. meclizine (ANTIVERT) 12.5 MG tablet Take 12.5 mg by mouth 3 times daily as needed      hydroCHLOROthiazide (HYDRODIURIL) 25 MG tablet Take 1 tablet by mouth in the morning. (Patient not taking: Reported on 8/19/2022) 90 tablet 1    apixaban (ELIQUIS) 5 MG TABS tablet Take 1 tablet by mouth 2 times daily 60 tablet 5     No current facility-administered medications for this visit. Vitals:    08/19/22 1304   BP: 122/78   Site: Right Upper Arm   Position: Sitting   Cuff Size: Medium Adult   Pulse: 86   Resp: 14   Temp: 97.1 °F (36.2 °C)   SpO2: 96%   Weight: 212 lb 3.2 oz (96.3 kg)   Height: 5' 7\" (1.702 m)     Body mass index is 33.24 kg/m².      Wt Readings from Last 3 Encounters:   08/19/22 212 lb 3.2 oz (96.3 kg)   05/31/22 199 lb (90.3 kg)   05/02/22 200 lb (90.7 kg)     BP Readings from Last 3 Encounters:   08/19/22 122/78   05/31/22 134/86   04/19/22 (!) 152/80       Consultants:   Patient Care Team:  Hanna Cortes MD as PCP - General (Internal Medicine)  Hanna Cortes MD as PCP - [address] [place] [address] Dr Castilloaneled Provider  Vaughn Li MD as Consulting Physician (Dermatology)  Yobani Gonzalez MD as Consulting Physician (Urology)  Tico De Paz MD as Consulting Physician (Interventional Cardiology)    Chief Complaint:   Stacy Gagnon is a [de-identified] y.o. male who presents for Medicare Preventive Physical Examination with Personalized Prevention Plan Services. HPI: Tr review listed chronic problems     Patient Active Problem List   Diagnosis    Gout    Coronary atherosclerosis    Benign non-nodular prostatic hyperplasia with lower urinary tract symptoms    Pain in limb    Hyperglycemia    Essential hypertension    Hx of colonic polyp    Chest pain    Well adult exam    Hyperlipidemia    Other constipation    Multiple atypical skin moles    Vertigo    Age-related cataract of right eye    Atherosclerosis of native coronary artery of native heart with other form of angina pectoris ([address]    Acute pulmonary edema (HCC)    Acute congestive heart failure (HCC)    Atrial fibrillation (HCC)    Acute urinary retention    Bradycardia    Acute on chronic diastolic heart failure (HCC)       Mood Disorders Screen:  Risk factors: none  Symptoms:  endorses difficulty sleeping and fatigue, denies depressed mood, difficulty concentrating, and change in interpersonal relationships   no  Safety Assessment:  Functional ability/ADLs:  Difficulty with bathing- , grooming- no, meals- no, incontinence- yes - s/p procedure as noted. Driving- yes. Home safety: Lives with family wife . Number of stairs to enter home: 0, within home: 0. Risk factors for falls: osteoarthritis (DJD). Home environment modifications:  no.     End of Life Planning:  Advanced Directive: has an advanced directive - a copy has been provided.       Preventive Care:  Self-testicular exams: Yes  Last PSA: 0.04, normal  Last colonoscopy: 2018, abnormal  AAA screening:  no   Last eye exam: 2022, glasses s/p cataracts  Exercise: yes  Seatbelt use: yes      Immunization History   Administered Date(s) Administered    COVID-19, PFIZER PURPLE top, DILUTE for use, (age 15 y+), 30mcg/0.3mL 2021, 2021, 2021    Pneumococcal Conjugate 13-valent ([place]) 2016    Td, unspecified formulation 2016       Past Medical History:   Diagnosis Date    Contact dermatitis and other eczema, due to unspecified cause     Coronary atherosclerosis of unspecified type of vessel, native or graft     Cardio- Dr. Ana Fletcher    Diastolic CHF [facility])     Gout, unspecified     Hypertension     Hypertrophy of prostate without urinary obstruction and other lower urinary tract symptoms (LUTS)     Other and unspecified hyperlipidemia     Peripheral vascular complications      Past Surgical History:   Procedure Laterality Date    CARDIAC SURGERY      4 vessel cabg Dr. Darya Loving y    COLONOSCOPY  07    Dr. Pietro Wu 2012    COLONOSCOPY  5.23.13    Sruthi Ring MD polyp repeat     COLONOSCOPY  2018    Dr. Magaly Duarte polyp removed no repeat necessary    Obi Hudson      Dr. Ej Couch     Family History   Problem Relation Age of Onset    Coronary Art Dis Father      Social History     Socioeconomic History    Marital status:      Spouse name: Not on file    Number of children: Not on file    Years of education: Not on file    Highest education level: Not on file   Occupational History    Not on file   Tobacco Use    Smoking status: Former     Packs/day: 1.50     Years: 50.00     Pack years: 75.00     Types: Cigarettes     Quit date: 2003     Years since quittin.6    Smokeless tobacco: Former   Substance and Sexual Activity    Alcohol use: Yes     Comment: Seldom    Drug use: No    Sexual activity: Yes     Partners: Female   Other Topics Concern    Not on file   Social History Narrative    Not on file     Social Determinants of Health     Financial Resource Strain: Low Risk     Difficulty of Paying Living Expenses: Not hard at all   Food Insecurity: No Food Insecurity    Worried About [address] in the Last Year: Never true    [address] [facility] [address] in the Last Year: Never true   Transportation Needs: Not on file   Physical Activity: Sufficiently Active    Days of Exercise per Week: 5 days    Minutes of Exercise per Session: 30 min   Stress: Not on file   Social Connections: Not on file   Intimate Partner Violence: Not on file   Housing Stability: Not on file     }        Visual Acuity: Corrected:            L  20/40            R 20/25    Cognitive Screening:  Clock drawing test score: 5/5. Mini-mental status exam score: NA. Assessment/Plan:  Deangelo Robert was seen today for medicare aw. Diagnoses and all orders for this visit:    Well adult exam    Chronic gout without tophus, unspecified cause, unspecified site  -     CBC with Auto Differential; Future  -     Comprehensive Metabolic Panel; Future  -     Lipid Panel; Future  -     TSH with Reflex; Future  -     Hemoglobin A1C; Future    Atherosclerosis of native coronary artery of native heart with other form of angina pectoris (HCC)  -     CBC with Auto Differential; Future  -     Comprehensive Metabolic Panel; Future  -     Lipid Panel; Future  -     TSH with Reflex; Future  -     Hemoglobin A1C; Future    Benign non-nodular prostatic hyperplasia with lower urinary tract symptoms  -     CBC with Auto Differential; Future  -     Comprehensive Metabolic Panel; Future  -     Lipid Panel; Future  -     TSH with Reflex; Future  -     Hemoglobin A1C; Future    Hyperglycemia  -     CBC with Auto Differential; Future  -     Comprehensive Metabolic Panel; Future  -     Lipid Panel; Future  -     TSH with Reflex; Future  -     Hemoglobin A1C; Future    Essential hypertension  -     CBC with Auto Differential; Future  -     Comprehensive Metabolic Panel; Future  -     Lipid Panel; Future  -     TSH with Reflex; Future  -     Hemoglobin A1C; Future    Hx of colonic polyp  -     CBC with Auto Differential; Future  -     Comprehensive Metabolic Panel; Future  -     Lipid Panel; Future  -     TSH with Reflex;  Future  - Hemoglobin A1C; Future    Mixed hyperlipidemia  -     CBC with Auto Differential; Future  -     Comprehensive Metabolic Panel; Future  -     Lipid Panel; Future  -     TSH with Reflex; Future  -     Hemoglobin A1C; Future    Vertigo  -     CBC with Auto Differential; Future  -     Comprehensive Metabolic Panel; Future  -     Lipid Panel; Future  -     TSH with Reflex; Future  -     Hemoglobin A1C; Future    Persistent atrial fibrillation (HCC)  -     CBC with Auto Differential; Future  -     Comprehensive Metabolic Panel; Future  -     Lipid Panel; Future  -     TSH with Reflex; Future  -     Hemoglobin A1C; Future    Acute on chronic diastolic heart failure (HCC)  -     CBC with Auto Differential; Future  -     Comprehensive Metabolic Panel; Future  -     Lipid Panel; Future  -     TSH with Reflex; Future  -     Hemoglobin A1C; Future    Acute pulmonary edema (HCC)    Other orders  -     nitroGLYCERIN (NITROSTAT) 0.4 MG SL tablet; Place 1 tablet under the tongue every 5 minutes as needed for Chest pain up to max of 3 total doses. If no relief after 1 dose, call 911. Medicare Annual Wellness Visit    Madhu Martinez is here for Medicare AWV    Assessment & Plan   Well adult exam  Assessment & Plan: Within normal limits for age retiredno ADL issues,immunizations up to date refuses Flu , no depression ,no cognitive impairment  Colonoscopy up to date repeat 2018-polyp no repeat needed 2nd to age    Eye exam up to date  Exercises as tolerated    No living will but does not want resuscitation   Findings and recommendations discussed with Pt   Chronic gout without tophus, unspecified cause, unspecified site  Assessment & Plan:   Well-controlled, continue current medications and lifestyle modifications recommended cont meds and diet Repeat labs in 6 months    Orders:  -     CBC with Auto Differential; Future  -     Comprehensive Metabolic Panel; Future  -     Lipid Panel; Future  -     TSH with Reflex;  Future  - Hemoglobin A1C; Future  Atherosclerosis of native coronary artery of native heart with other form of angina pectoris [facility])  Assessment & Plan:   Monitored by specialist- no acute findings meriting change in the plan  Orders:  -     CBC with Auto Differential; Future  -     Comprehensive Metabolic Panel; Future  -     Lipid Panel; Future  -     TSH with Reflex; Future  -     Hemoglobin A1C; Future  Benign non-nodular prostatic hyperplasia with lower urinary tract symptoms  Assessment & Plan:   Monitored by specialist- no acute findings meriting change in the plan  Orders:  -     CBC with Auto Differential; Future  -     Comprehensive Metabolic Panel; Future  -     Lipid Panel; Future  -     TSH with Reflex; Future  -     Hemoglobin A1C; Future  Hyperglycemia  Assessment & Plan:    diet controled check labs in 6 mo  Orders:  -     CBC with Auto Differential; Future  -     Comprehensive Metabolic Panel; Future  -     Lipid Panel; Future  -     TSH with Reflex; Future  -     Hemoglobin A1C; Future  Essential hypertension  Assessment & Plan:   Well-controlled, continue current medications and lifestyle modifications recommended cont to check BP occ at home   Orders:  -     CBC with Auto Differential; Future  -     Comprehensive Metabolic Panel; Future  -     Lipid Panel; Future  -     TSH with Reflex; Future  -     Hemoglobin A1C; Future  Hx of colonic polyp  Assessment & Plan:   Remains stable no symptoms   Orders:  -     CBC with Auto Differential; Future  -     Comprehensive Metabolic Panel; Future  -     Lipid Panel; Future  -     TSH with Reflex; Future  -     Hemoglobin A1C; Future  Mixed hyperlipidemia  Assessment & Plan:   Well-controlled, continue current medications and lifestyle modifications recommended close to goal cont meds and diet Repeat labs in 6 months    Orders:  -     CBC with Auto Differential; Future  -     Comprehensive Metabolic Panel; Future  -     Lipid Panel;  Future  -     TSH with Reflex; Future  -     Hemoglobin A1C; Future  Vertigo  Assessment & Plan:    occ symptoms as noted   Orders:  -     CBC with Auto Differential; Future  -     Comprehensive Metabolic Panel; Future  -     Lipid Panel; Future  -     TSH with Reflex; Future  -     Hemoglobin A1C; Future  Persistent atrial fibrillation (HCC)  Assessment & Plan:   Monitored by specialist- no acute findings meriting change in the plan to see Dr Micha Bates next week meds as noted remains in NSR   Orders:  -     CBC with Auto Differential; Future  -     Comprehensive Metabolic Panel; Future  -     Lipid Panel; Future  -     TSH with Reflex; Future  -     Hemoglobin A1C; Future  Acute on chronic diastolic heart failure (HCC)  Assessment & Plan:   Monitored by specialist- no acute findings meriting change in the plan  Orders:  -     CBC with Auto Differential; Future  -     Comprehensive Metabolic Panel; Future  -     Lipid Panel; Future  -     TSH with Reflex; Future  -     Hemoglobin A1C; Future  Acute pulmonary edema (HCC)  Assessment & Plan:   Monitored by specialist- no acute findings meriting change in the plan    Recommendations for Preventive Services Due: see orders and patient instructions/AVS.  Recommended screening schedule for the next 5-10 years is provided to the patient in written form: see Patient Instructions/AVS.     No follow-ups on file.      Subjective   The following acute and/or chronic problems were also addressed today:  Gout   Well-controlled, continue current medications and lifestyle modifications recommended cont meds and diet Repeat labs in 6 months      Coronary atherosclerosis   Monitored by specialist- no acute findings meriting change in the plan cont with Dr Alix Lazaro    Benign non-nodular prostatic hyperplasia with lower urinary tract symptoms   Monitored by specialist- no acute findings meriting change in the plan    Hyperglycemia    diet controled check labs in 6 mo    Essential hypertension   Well-controlled, continue current medications and lifestyle modifications recommended cont to check BP occ at home     Hx of colonic polyp   Remains stable no symptoms     Hyperlipidemia   Well-controlled, continue current medications and lifestyle modifications recommended close to goal cont meds and diet Repeat labs in 6 months      Vertigo    occ symptoms as noted     Atherosclerosis of native coronary artery of native heart with other form of angina pectoris [facility]   Monitored by specialist- no acute findings meriting change in the plan    Atrial fibrillation ([facility]   Monitored by specialist- no acute findings meriting change in the plan to see Dr Aixa Hines next week meds as noted remains in NSR     Acute on chronic diastolic heart failure ([facility]   Monitored by specialist- no acute findings meriting change in the plan    Well adult exam   Within normal limits for age retiredno ADL issues,immunizations up to date refuses Flu , no depression ,no cognitive impairment  Colonoscopy up to date repeat 2018-polyp no repeat needed 2nd to age    Eye exam up to date  Exercises as tolerated    No living will but does not want resuscitation   Findings and recommendations discussed with Pt     Acute pulmonary edema ([facility]   Monitored by specialist- no acute findings meriting change in the plan    Patient's complete Health Risk Assessment and screening values have been reviewed and are found in Flowsheets. The following problems were reviewed today and where indicated follow up appointments were made and/or referrals ordered.     Positive Risk Factor Screenings with Interventions:     Cognitive:  Clock Drawing Test (CDT): Normal  Total Score Interpretation: Abnormal Mini-Cog  Cognitive Impairment Interventions:  NA            Health Habits/Nutrition:  Physical Activity: Sufficiently Active    Days of Exercise per Week: 5 days    Minutes of Exercise per Session: 30 min     Have you lost any weight without trying in the past 3 months?: No  Body mass index: (!) 33. 23  Have you seen the dentist within the past year?: N/A - wear dentures  Health Habits/Nutrition Interventions:  NA    Hearing/Vision:  Do you or your family notice any trouble with your hearing that hasn't been managed with hearing aids?: (!) Yes (refuses hearing aid)  Do you have difficulty driving, watching TV, or doing any of your daily activities because of your eyesight?: No  Have you had an eye exam within the past year?: Yes  No results found. Hearing/Vision Interventions:  Vision concerns:  patient encouraged to make appointment with his/her eye specialist     ADLs:  In the past 7 days, did you need help from others to perform any of the following everyday activities: Eating, dressing, grooming, bathing, toileting, or walking/balance?: No  In the past 7 days, did you need help from others to take care of any of the following: Laundry, housekeeping, banking/finances, shopping, telephone use, food preparation, transportation, or taking medications?: (!) Yes  Select all that apply: (!) Transportation, Food Preparation, Housekeeping, Laundry, Banking/Finances  ADL Interventions:  NA          Objective   Vitals:    08/19/22 1304   BP: 122/78   Site: Right Upper Arm   Position: Sitting   Cuff Size: Medium Adult   Pulse: 86   Resp: 14   Temp: 97.1 °F (36.2 °C)   SpO2: 96%   Weight: 212 lb 3.2 oz (96.3 kg)   Height: 5' 7\" (1.702 m)      Body mass index is 33.24 kg/m². Allergies   Allergen Reactions    Sulfa Antibiotics      Prior to Visit Medications    Medication Sig Taking? Authorizing Provider   mirabegron (MYRBETRIQ) 50 MG TB24 Take 50 mg by mouth daily Yes Historical Provider, MD   zolpidem (AMBIEN) 10 MG tablet Take by mouth nightly as needed for Sleep. Yes Historical Provider, MD   nitroGLYCERIN (NITROSTAT) 0.4 MG SL tablet Place 1 tablet under the tongue every 5 minutes as needed for Chest pain up to max of 3 total doses. If no relief after 1 dose, call 911.  Yes Edison Sanchez MD amLODIPine (NORVASC) 2.5 MG tablet Take 1 tablet by mouth daily Yes Alecia Kearney MD   aspirin EC 81 MG EC tablet Take 1 tablet by mouth daily Yes Azul Arevalo MD   furosemide (LASIX) 40 MG tablet Take 1 tablet by mouth daily Yes Ankur Murillo MD   famotidine (PEPCID) 20 MG tablet Take 1 tablet by mouth 2 times daily Yes Ankur Murillo MD   isosorbide mononitrate (IMDUR) 60 MG extended release tablet Take 60 mg by mouth in the morning and at bedtime Yes Historical Provider, MD   atorvastatin (LIPITOR) 40 MG tablet Take 1 tablet by mouth daily Yes Alecia Kearney MD   allopurinol (ZYLOPRIM) 300 MG tablet TAKE ONE TABLET BY MOUTH DAILY Yes Alecia Kearney MD   colchicine (COLCRYS) 0.6 MG tablet TAKE ONE TABLET BY MOUTH DAILY Yes Alecia Kearney MD   vitamin D (CHOLECALCIFEROL) 1000 UNIT TABS tablet Take 2,000 Int'l Units by mouth daily. Yes Historical Provider, MD   HYDROcodone-acetaminophen (NORCO) 5-325 MG per tablet Take 1 tablet by mouth every 6 hours as needed. Historical Provider, MD   meclizine (ANTIVERT) 12.5 MG tablet Take 12.5 mg by mouth 3 times daily as needed  Historical Provider, MD   hydroCHLOROthiazide (HYDRODIURIL) 25 MG tablet Take 1 tablet by mouth in the morning.   Patient not taking: Reported on 8/19/2022  Alecia Kearney MD   apixaban (ELIQUIS) 5 MG TABS tablet Take 1 tablet by mouth 2 times daily  Azul Arevalo MD       [facility] (Including outside providers/suppliers regularly involved in providing care):   Patient Care Team:  Alecia Kearney MD as PCP - General (Internal Medicine)  Alecia Kearney MD as PCP - [facility]  Tali Foley MD as Consulting Physician (Dermatology)  Jonn White MD as Consulting Physician (Urology)  Guy Ferrer MD as Consulting Physician (Interventional Cardiology)     Reviewed and updated this visit:  Tobacco  Allergies  Meds  Problems  Med Hx  Surg Hx  Soc Hx  Fam Hx

## 2022-08-24 NOTE — PROGRESS NOTES
[address]   Cardiac Electrophysiology Consultation   Date: 8/25/2022  Reason for Consultation:  AF  Consult Requesting Physician: No att. providers found     Chief Complaint:   Chief Complaint   Patient presents with    Follow-up     1 month follow up / s/p DCCV     HPI: Bethany Suarez is a [de-identified] y.o. male with PMH significant for CAD, s/p CABG (2003), HTN, HLD, HFpEF, follows Dr. Eliezer Caballero. In 3/2022, admitted for CHF exacerbation, found to be in AF with slow ventricular response, rate down to 32 bpm while sleeping and pauses <2.5 sec, asymptomatic, on Eliquis. Outpatient monitor showed 100% AF, 6 pauses up to 2.8 sec, and 3 runs of NSVT (longest 10 beats). Preserved LVEF with LAE (4.6 cm). S/p DCCV (5/2/22). S/p prostate surgery (6/7/22), Eliquis held for hematuria, but resumed (7/5/22). Interval History: Today, he is here for f/u post DCCV, presenting in SR with PACs. He feels better since the DCCV with improvement in energy. Since resuming the Eliquis, his hematuria had resolved. However, today, he noticed the hematuria has come back. Otherwise, denies complaints of palpitations, dizziness, CP, SOB, orthopnea, presyncope, or syncope. Assessment:  1. Persistent AF SVR, on Eliquis  2. Bradycardia  3. CAD, s/p CABG, on ASA  4. HTN, stable on amlodipine  5. HLD, on statin  6. TR / MR, mild/moderate    Plan:  1. Continue Eliquis 5 mg BID  2. Follow up in 3 months with EP NP. If he continues to have hematuria, will consider Watchman device.     Past Medical History:   Diagnosis Date    Contact dermatitis and other eczema, due to unspecified cause     Coronary atherosclerosis of unspecified type of vessel, native or graft     Cardio- Dr. Natasha Barakat    Diastolic CHF [facility])     Gout, unspecified     Hypertension     Hypertrophy of prostate without urinary obstruction and other lower urinary tract symptoms (LUTS)     Other and unspecified hyperlipidemia     Peripheral vascular complications Past Surgical History:   Procedure Laterality Date    CARDIAC SURGERY      4 vessel cabg Dr. Marbella Cardona y    COLONOSCOPY  2/16/07    Dr. Garland Caruso 2/2012    COLONOSCOPY  5.23.13    Ibrahima Alva MD polyp repeat 2018    COLONOSCOPY  06/18/2018    Dr. Leandro Olivares polyp removed no repeat necessary    Mavis Yen  2003    Dr. Wood Contreras       Allergies: Allergies   Allergen Reactions    Sulfa Antibiotics        Medication:   Prior to Admission medications    Medication Sig Start Date End Date Taking? Authorizing Provider   mirabegron (MYRBETRIQ) 50 MG TB24 Take 50 mg by mouth daily   Yes Historical Provider, MD   HYDROcodone-acetaminophen (NORCO) 5-325 MG per tablet Take 1 tablet by mouth every 6 hours as needed. Yes Historical Provider, MD   meclizine (ANTIVERT) 12.5 MG tablet Take 12.5 mg by mouth 3 times daily as needed   Yes Historical Provider, MD   zolpidem (AMBIEN) 10 MG tablet Take by mouth nightly as needed for Sleep. Yes Historical Provider, MD   nitroGLYCERIN (NITROSTAT) 0.4 MG SL tablet Place 1 tablet under the tongue every 5 minutes as needed for Chest pain up to max of 3 total doses.  If no relief after 1 dose, call 911. 8/19/22  Yes Tushar Shore MD   hydroCHLOROthiazide (HYDRODIURIL) 25 MG tablet Take 1 tablet by mouth in the morning. 7/29/22  Yes Tushar Shore MD   amLODIPine (NORVASC) 2.5 MG tablet Take 1 tablet by mouth daily 5/17/22  Yes Tushar Shore MD   aspirin EC 81 MG EC tablet Take 1 tablet by mouth daily 4/7/22  Yes Navjot Caputo MD   apixaban (ELIQUIS) 5 MG TABS tablet Take 1 tablet by mouth 2 times daily 4/7/22 8/25/22 Yes Navjot Caputo MD   furosemide (LASIX) 40 MG tablet Take 1 tablet by mouth daily 3/19/22  Yes Angel Aguayo MD   famotidine (PEPCID) 20 MG tablet Take 1 tablet by mouth 2 times daily 3/19/22  Yes Angel Aguayo MD   isosorbide mononitrate (IMDUR) 60 MG extended release tablet Take 60 mg by mouth in the morning abnormalities. The MCOT, echocardiogram, stress test, and coronary angiography/PCI were reviewed by myself and used for my plan of care. - The patient is counseled to follow a low salt diet to assure blood pressure remains controlled for cardiovascular risk factor modification.   - The patient is counseled to avoid excess caffeine, and energy drinks as this may exacerbated ectopy and arrhythmia. - The patient is counseled to get regular exercise 3-5 times per week to control cardiovascular risk factors. - The patient is counseled to lose weigt to control cardiovascular risk factors. -The patient is counseled about the health hazards of smoking including cardiovascular side effects and its impact on morbidity and mortality. Thank you for allowing me to participate in the care of [address] [place] [address]. All questions and concerns were addressed to the patient/family. Alternatives to my treatment were discussed. Shaye Anguiano RN, am scribing for and in the presence of Dr. Sarah Kinsey. 08/25/22 1:17 PM  Heber Mathur RN    I, Terra Barboza MD, personally performed the services prescribed in this documentation as scribed by Ms. Heber Mathur RN in my presence and it is both accurate and complete.        Terra Barboza MD  Cardiac Electrophysiology  [facility] ([place]

## 2022-08-25 ENCOUNTER — OFFICE VISIT (OUTPATIENT)
Dept: CARDIOLOGY CLINIC | Age: 82
End: 2022-08-25
Payer: COMMERCIAL

## 2022-08-25 VITALS
HEART RATE: 81 BPM | WEIGHT: 218 LBS | DIASTOLIC BLOOD PRESSURE: 64 MMHG | SYSTOLIC BLOOD PRESSURE: 122 MMHG | BODY MASS INDEX: 34.14 KG/M2

## 2022-08-25 DIAGNOSIS — I50.32 CHRONIC DIASTOLIC CONGESTIVE HEART FAILURE (HCC): ICD-10-CM

## 2022-08-25 DIAGNOSIS — I48.19 PERSISTENT ATRIAL FIBRILLATION (HCC): Primary | ICD-10-CM

## 2022-08-25 DIAGNOSIS — I07.1 TRICUSPID VALVE INSUFFICIENCY, UNSPECIFIED ETIOLOGY: ICD-10-CM

## 2022-08-25 DIAGNOSIS — I10 ESSENTIAL HYPERTENSION: ICD-10-CM

## 2022-08-25 DIAGNOSIS — E78.5 HYPERLIPIDEMIA, UNSPECIFIED HYPERLIPIDEMIA TYPE: ICD-10-CM

## 2022-08-25 DIAGNOSIS — I25.10 CAD IN NATIVE ARTERY: ICD-10-CM

## 2022-08-25 PROCEDURE — G8427 DOCREV CUR MEDS BY ELIG CLIN: HCPCS | Performed by: INTERNAL MEDICINE

## 2022-08-25 PROCEDURE — 1123F ACP DISCUSS/DSCN MKR DOCD: CPT | Performed by: INTERNAL MEDICINE

## 2022-08-25 PROCEDURE — 99214 OFFICE O/P EST MOD 30 MIN: CPT | Performed by: INTERNAL MEDICINE

## 2022-08-25 PROCEDURE — 93000 ELECTROCARDIOGRAM COMPLETE: CPT | Performed by: INTERNAL MEDICINE

## 2022-08-25 PROCEDURE — G8417 CALC BMI ABV UP PARAM F/U: HCPCS | Performed by: INTERNAL MEDICINE

## 2022-08-25 PROCEDURE — 1036F TOBACCO NON-USER: CPT | Performed by: INTERNAL MEDICINE

## 2022-09-09 ENCOUNTER — OFFICE VISIT (OUTPATIENT)
Dept: CARDIOLOGY CLINIC | Age: 82
End: 2022-09-09
Payer: COMMERCIAL

## 2022-09-09 VITALS
HEART RATE: 90 BPM | WEIGHT: 217 LBS | BODY MASS INDEX: 33.99 KG/M2 | SYSTOLIC BLOOD PRESSURE: 116 MMHG | DIASTOLIC BLOOD PRESSURE: 68 MMHG

## 2022-09-09 DIAGNOSIS — I48.19 PERSISTENT ATRIAL FIBRILLATION (HCC): Primary | ICD-10-CM

## 2022-09-09 DIAGNOSIS — I25.10 CAD IN NATIVE ARTERY: ICD-10-CM

## 2022-09-09 DIAGNOSIS — Z95.1 HX OF CABG: ICD-10-CM

## 2022-09-09 DIAGNOSIS — R00.1 BRADYCARDIA: ICD-10-CM

## 2022-09-09 DIAGNOSIS — I50.32 CHRONIC DIASTOLIC CONGESTIVE HEART FAILURE (HCC): ICD-10-CM

## 2022-09-09 PROCEDURE — 1036F TOBACCO NON-USER: CPT | Performed by: INTERNAL MEDICINE

## 2022-09-09 PROCEDURE — 1123F ACP DISCUSS/DSCN MKR DOCD: CPT | Performed by: INTERNAL MEDICINE

## 2022-09-09 PROCEDURE — 99214 OFFICE O/P EST MOD 30 MIN: CPT | Performed by: INTERNAL MEDICINE

## 2022-09-09 PROCEDURE — G8417 CALC BMI ABV UP PARAM F/U: HCPCS | Performed by: INTERNAL MEDICINE

## 2022-09-09 PROCEDURE — G8427 DOCREV CUR MEDS BY ELIG CLIN: HCPCS | Performed by: INTERNAL MEDICINE

## 2022-09-09 ASSESSMENT — ENCOUNTER SYMPTOMS
SHORTNESS OF BREATH: 0
COUGH: 0
CHOKING: 0
CHEST TIGHTNESS: 0
ABDOMINAL DISTENTION: 0
APNEA: 0

## 2022-09-09 NOTE — PROGRESS NOTES
Subjective:      Patient ID: Gena Putnam is a [de-identified] y.o. male. HPI  Follow up for afib/cad/cabg/chf/bradycardia. No cp/sob. No palp/tachy. S/pECV per EP. No syncope. No edema. No pnd or orthopnea.   No bleeding issues     Past Medical History:   Diagnosis Date    Contact dermatitis and other eczema, due to unspecified cause     Coronary atherosclerosis of unspecified type of vessel, native or graft     Cardio- Dr. Lennox Kauffman    Diastolic CHF [facility])     Gout, unspecified     Hypertension     Hypertrophy of prostate without urinary obstruction and other lower urinary tract symptoms (LUTS)     Other and unspecified hyperlipidemia     Peripheral vascular complications      Past Surgical History:   Procedure Laterality Date    CARDIAC SURGERY      4 vessel cabg Dr. Simona Meigs y    COLONOSCOPY  07    Dr. Marita Steele 2012    COLONOSCOPY  5.23.13    Chio Hinkle MD polyp repeat 2018    COLONOSCOPY  2018    Dr. Billings Speaks polyp removed no repeat necessary    CORONARY ARTERY BYPASS GRAFT      Dr. Wendy Hyatt History     Socioeconomic History    Marital status:      Spouse name: Not on file    Number of children: Not on file    Years of education: Not on file    Highest education level: Not on file   Occupational History    Not on file   Tobacco Use    Smoking status: Former     Packs/day: 1.50     Years: 50.00     Pack years: 75.00     Types: Cigarettes     Quit date: 2003     Years since quittin.7    Smokeless tobacco: Former   Substance and Sexual Activity    Alcohol use: Yes     Comment: Seldom    Drug use: No    Sexual activity: Yes     Partners: Female   Other Topics Concern    Not on file   Social History Narrative    Not on file     Social Determinants of Health     Financial Resource Strain: Low Risk     Difficulty of Paying Living Expenses: Not hard at all   Food Insecurity: No Food Insecurity    Worried About [address] in the Last Year: Never true    [address] [facility] in the Last Year: Never true   Transportation Needs: Not on file   Physical Activity: Sufficiently Active    Days of Exercise per Week: 5 days    Minutes of Exercise per Session: 30 min   Stress: Not on file   Social Connections: Not on file   Intimate Partner Violence: Not on file   Housing Stability: Not on file     Zechariah Charles reviewed    Vitals:    09/09/22 1459   BP: 116/68   Pulse: 90     Wt 217    Review of Systems   Constitutional:  Negative for activity change and fatigue. Respiratory:  Negative for apnea, cough, choking, chest tightness and shortness of breath. Cardiovascular:  Negative for chest pain, palpitations and leg swelling. No PND or orthopnea. No tachycardia. Gastrointestinal:  Negative for abdominal distention. Musculoskeletal:  Negative for myalgias. Neurological:  Negative for dizziness, syncope and light-headedness. Psychiatric/Behavioral:  Negative for agitation, behavioral problems and confusion. All other systems reviewed and are negative. Objective:   Physical Exam  Constitutional:       General: He is not in acute distress. Appearance: Normal appearance. He is well-developed. HENT:      Head: Normocephalic and atraumatic. Right Ear: External ear normal.      Left Ear: External ear normal.   Neck:      Vascular: No JVD. Cardiovascular:      Rate and Rhythm: Normal rate and regular rhythm. Heart sounds: Normal heart sounds. No gallop. Pulmonary:      Effort: Pulmonary effort is normal. No respiratory distress. Breath sounds: Normal breath sounds. No wheezing or rales. Abdominal:      General: Bowel sounds are normal.      Palpations: Abdomen is soft. Tenderness: There is no abdominal tenderness. Musculoskeletal:         General: Normal range of motion. Cervical back: Normal range of motion and neck supple. Skin:     General: Skin is warm and dry. Neurological:      General: No focal deficit present.       Mental Status: He is alert and oriented to person, place, and time. Psychiatric:         Mood and Affect: Mood normal.         Behavior: Behavior normal.       Assessment:       Diagnosis Orders   1. Persistent atrial fibrillation ([address]        2. Chronic diastolic congestive heart failure ([address]        3. CAD in native artery        4. Hx of CABG        5. Bradycardia                  Plan:      CV stable. EP following. s/p ECV. No cp. CHF compensated. On AC , no bleeding issues. Continue norvasc/lipitor/HCTZ/Imdur/Lasix for CHF/afib/CAD. EP following. No changes. Continue to monitor. Follow up 3 months.         Kimberly Suggs MD

## 2022-10-25 RX ORDER — APIXABAN 5 MG/1
TABLET, FILM COATED ORAL
Qty: 180 TABLET | Refills: 3 | Status: SHIPPED | OUTPATIENT
Start: 2022-10-25

## 2022-10-25 NOTE — TELEPHONE ENCOUNTER
Requested Prescriptions     Pending Prescriptions Disp Refills    ELIQUIS 5 MG TABS tablet [Pharmacy Med Name: ELIQUIS 5MG TABLETS] 180 tablet 3     Sig: TAKE 1 TABLET BY MOUTH TWICE DAILY              Last Office Visit: 9/9/2022     Next Office Visit: 11/22/2022         Last Labs: [phone]

## 2022-10-30 ENCOUNTER — PATIENT MESSAGE (OUTPATIENT)
Dept: INTERNAL MEDICINE CLINIC | Age: 82
End: 2022-10-30

## 2022-10-31 RX ORDER — FUROSEMIDE 40 MG/1
40 TABLET ORAL DAILY
Qty: 90 TABLET | Refills: 3 | Status: SHIPPED | OUTPATIENT
Start: 2022-10-31

## 2022-10-31 NOTE — TELEPHONE ENCOUNTER
From: Irving Nava  To: Dr. Aaliyah Coker: 10/30/2022 11:52 AM EDT  Subject: request refills    I'm running out of furosemide (no refills). Please order it.   Pharmacy Details  [place], [place] -   [phone]

## 2022-11-02 DIAGNOSIS — I10 PRIMARY HYPERTENSION: ICD-10-CM

## 2022-11-02 RX ORDER — AMLODIPINE BESYLATE 2.5 MG/1
2.5 TABLET ORAL DAILY
Qty: 90 TABLET | Refills: 1 | Status: SHIPPED | OUTPATIENT
Start: 2022-11-02

## 2022-11-02 RX ORDER — HYDROCHLOROTHIAZIDE 25 MG/1
25 TABLET ORAL DAILY
Qty: 90 TABLET | Refills: 1 | Status: SHIPPED | OUTPATIENT
Start: 2022-11-02

## 2022-11-12 DIAGNOSIS — I10 PRIMARY HYPERTENSION: ICD-10-CM

## 2022-11-14 RX ORDER — AMLODIPINE BESYLATE 2.5 MG/1
2.5 TABLET ORAL DAILY
Qty: 90 TABLET | Refills: 1 | OUTPATIENT
Start: 2022-11-14

## 2022-12-02 DIAGNOSIS — M1A.9XX0 CHRONIC GOUT WITHOUT TOPHUS, UNSPECIFIED CAUSE, UNSPECIFIED SITE: ICD-10-CM

## 2022-12-02 RX ORDER — ASPIRIN 81 MG/1
81 TABLET ORAL DAILY
Qty: 90 TABLET | Refills: 3 | Status: SHIPPED | OUTPATIENT
Start: 2022-12-02

## 2022-12-02 RX ORDER — ALLOPURINOL 300 MG/1
TABLET ORAL
Qty: 90 TABLET | Refills: 1 | Status: SHIPPED | OUTPATIENT
Start: 2022-12-02

## 2022-12-02 NOTE — TELEPHONE ENCOUNTER
Requested Prescriptions     Pending Prescriptions Disp Refills    aspirin EC 81 MG EC tablet 90 tablet 3     Sig: Take 1 tablet by mouth daily            Last Office Visit: 9/9/2022     Next Office Visit: 12/20/2022         Last Labs: [phone]

## 2022-12-07 DIAGNOSIS — E78.2 MIXED HYPERLIPIDEMIA: ICD-10-CM

## 2022-12-07 RX ORDER — ATORVASTATIN CALCIUM 40 MG/1
40 TABLET, FILM COATED ORAL DAILY
Qty: 90 TABLET | Refills: 1 | Status: SHIPPED | OUTPATIENT
Start: 2022-12-07

## 2022-12-20 ENCOUNTER — OFFICE VISIT (OUTPATIENT)
Dept: CARDIOLOGY CLINIC | Age: 82
End: 2022-12-20
Payer: COMMERCIAL

## 2022-12-20 VITALS
BODY MASS INDEX: 33.83 KG/M2 | DIASTOLIC BLOOD PRESSURE: 80 MMHG | SYSTOLIC BLOOD PRESSURE: 134 MMHG | WEIGHT: 216 LBS | HEART RATE: 80 BPM

## 2022-12-20 DIAGNOSIS — I25.10 CAD IN NATIVE ARTERY: ICD-10-CM

## 2022-12-20 DIAGNOSIS — Z95.1 HX OF CABG: ICD-10-CM

## 2022-12-20 DIAGNOSIS — I50.32 CHRONIC DIASTOLIC CONGESTIVE HEART FAILURE (HCC): ICD-10-CM

## 2022-12-20 DIAGNOSIS — I48.19 PERSISTENT ATRIAL FIBRILLATION (HCC): Primary | ICD-10-CM

## 2022-12-20 DIAGNOSIS — R00.1 BRADYCARDIA: ICD-10-CM

## 2022-12-20 PROCEDURE — G8484 FLU IMMUNIZE NO ADMIN: HCPCS | Performed by: INTERNAL MEDICINE

## 2022-12-20 PROCEDURE — G8427 DOCREV CUR MEDS BY ELIG CLIN: HCPCS | Performed by: INTERNAL MEDICINE

## 2022-12-20 PROCEDURE — 1123F ACP DISCUSS/DSCN MKR DOCD: CPT | Performed by: INTERNAL MEDICINE

## 2022-12-20 PROCEDURE — 99214 OFFICE O/P EST MOD 30 MIN: CPT | Performed by: INTERNAL MEDICINE

## 2022-12-20 PROCEDURE — G8417 CALC BMI ABV UP PARAM F/U: HCPCS | Performed by: INTERNAL MEDICINE

## 2022-12-20 PROCEDURE — 3078F DIAST BP <80 MM HG: CPT | Performed by: INTERNAL MEDICINE

## 2022-12-20 PROCEDURE — 1036F TOBACCO NON-USER: CPT | Performed by: INTERNAL MEDICINE

## 2022-12-20 PROCEDURE — 3074F SYST BP LT 130 MM HG: CPT | Performed by: INTERNAL MEDICINE

## 2022-12-20 ASSESSMENT — ENCOUNTER SYMPTOMS
CHEST TIGHTNESS: 0
SHORTNESS OF BREATH: 0
CHOKING: 0
COUGH: 0
APNEA: 0
ABDOMINAL DISTENTION: 0

## 2022-12-20 NOTE — PROGRESS NOTES
Subjective:      Patient ID: Gail Ferrera is a 81 y.o. male. HPI  Follow up for afib/cad/cabg/chf/bradycardia. No cp/sob. No palp/tachy. S/pECV per EP. No syncope. No edema. No pnd or orthopnea.   No bleeding issues     Past Medical History:   Diagnosis Date    Contact dermatitis and other eczema, due to unspecified cause     Coronary atherosclerosis of unspecified type of vessel, native or graft     Cardio- Dr. Zachary Gomez    Diastolic CHF [facility])     Gout, unspecified     Hypertension     Hypertrophy of prostate without urinary obstruction and other lower urinary tract symptoms (LUTS)     Other and unspecified hyperlipidemia     Peripheral vascular complications      Past Surgical History:   Procedure Laterality Date    CARDIAC SURGERY      4 vessel cabg Dr. Breanna Lang y    COLONOSCOPY  07    Dr. Smith Andersen 2012    COLONOSCOPY  5.23.13    Carmelo Carlson MD polyp repeat 2018    COLONOSCOPY  2018    Dr. Orlando Piper polyp removed no repeat necessary    CORONARY ARTERY BYPASS GRAFT      Dr. Lurdes Vu History     Socioeconomic History    Marital status:      Spouse name: Not on file    Number of children: Not on file    Years of education: Not on file    Highest education level: Not on file   Occupational History    Not on file   Tobacco Use    Smoking status: Former     Packs/day: 1.50     Years: 50.00     Pack years: 75.00     Types: Cigarettes     Quit date: 2003     Years since quittin.9    Smokeless tobacco: Former   Substance and Sexual Activity    Alcohol use: Yes     Comment: Seldom    Drug use: No    Sexual activity: Yes     Partners: Female   Other Topics Concern    Not on file   Social History Narrative    Not on file     Social Determinants of Health     Financial Resource Strain: Low Risk     Difficulty of Paying Living Expenses: Not hard at all   Food Insecurity: No Food Insecurity    Worried About [address] in the Last Year: Never true    [address] [facility] in the Last Year: Never true   Transportation Needs: Not on file   Physical Activity: Sufficiently Active    Days of Exercise per Week: 5 days    Minutes of Exercise per Session: 30 min   Stress: Not on file   Social Connections: Not on file   Intimate Partner Violence: Not on file   Housing Stability: Not on file     Zechariah Charles reviewed    Vitals:    12/20/22 1412   BP: 134/80   Pulse: 80         Wt 216    Review of Systems   Constitutional:  Negative for activity change and fatigue. Respiratory:  Negative for apnea, cough, choking, chest tightness and shortness of breath. Cardiovascular:  Negative for chest pain, palpitations and leg swelling. No PND or orthopnea. No tachycardia. Gastrointestinal:  Negative for abdominal distention. Musculoskeletal:  Negative for myalgias. Neurological:  Negative for dizziness, syncope and light-headedness. Psychiatric/Behavioral:  Negative for agitation, behavioral problems and confusion. All other systems reviewed and are negative. Objective:   Physical Exam  Constitutional:       General: He is not in acute distress. Appearance: Normal appearance. He is well-developed. HENT:      Head: Normocephalic and atraumatic. Right Ear: External ear normal.      Left Ear: External ear normal.   Neck:      Vascular: No JVD. Cardiovascular:      Rate and Rhythm: Normal rate and regular rhythm. Heart sounds: Normal heart sounds. No gallop. Pulmonary:      Effort: Pulmonary effort is normal. No respiratory distress. Breath sounds: Normal breath sounds. No wheezing or rales. Abdominal:      General: Bowel sounds are normal.      Palpations: Abdomen is soft. Tenderness: There is no abdominal tenderness. Musculoskeletal:         General: Normal range of motion. Cervical back: Normal range of motion and neck supple. Skin:     General: Skin is warm and dry. Neurological:      General: No focal deficit present.       Mental Status: He is alert and oriented to person, place, and time. Psychiatric:         Mood and Affect: Mood normal.         Behavior: Behavior normal.       Assessment:       Diagnosis Orders   1. Persistent atrial fibrillation ([address]        2. Chronic diastolic congestive heart failure ([address]        3. CAD in native artery        4. Hx of CABG        5. Bradycardia                Plan:      CV stable. EP following. s/p ECV. No cp. CHF compensated. On AC , no bleeding issues. Continue norvasc/lipitor/HCTZ/Imdur/Lasix for CHF/afib/CAD. Prn amlodipine. EP following. No changes. Continue to monitor. Follow up 3 months.         José Gutiérrez MD

## 2023-02-18 SDOH — HEALTH STABILITY: PHYSICAL HEALTH
ON AVERAGE, HOW MANY DAYS PER WEEK DO YOU ENGAGE IN MODERATE TO STRENUOUS EXERCISE (LIKE A BRISK WALK)?: PATIENT DECLINED

## 2023-02-20 ENCOUNTER — OFFICE VISIT (OUTPATIENT)
Dept: INTERNAL MEDICINE CLINIC | Age: 83
End: 2023-02-20
Payer: COMMERCIAL

## 2023-02-20 VITALS
HEART RATE: 59 BPM | BODY MASS INDEX: 33.97 KG/M2 | TEMPERATURE: 98.4 F | HEIGHT: 66 IN | WEIGHT: 211.4 LBS | OXYGEN SATURATION: 98 % | SYSTOLIC BLOOD PRESSURE: 134 MMHG | DIASTOLIC BLOOD PRESSURE: 70 MMHG

## 2023-02-20 DIAGNOSIS — I10 ESSENTIAL HYPERTENSION: ICD-10-CM

## 2023-02-20 DIAGNOSIS — M1A.9XX0 CHRONIC GOUT WITHOUT TOPHUS, UNSPECIFIED CAUSE, UNSPECIFIED SITE: ICD-10-CM

## 2023-02-20 DIAGNOSIS — I25.118 ATHEROSCLEROSIS OF NATIVE CORONARY ARTERY OF NATIVE HEART WITH OTHER FORM OF ANGINA PECTORIS (HCC): ICD-10-CM

## 2023-02-20 DIAGNOSIS — M15.9 PRIMARY OSTEOARTHRITIS INVOLVING MULTIPLE JOINTS: ICD-10-CM

## 2023-02-20 DIAGNOSIS — Z86.010 HISTORY OF COLONIC POLYPS: ICD-10-CM

## 2023-02-20 DIAGNOSIS — F51.01 PRIMARY INSOMNIA: Primary | ICD-10-CM

## 2023-02-20 DIAGNOSIS — N40.1 BENIGN NON-NODULAR PROSTATIC HYPERPLASIA WITH LOWER URINARY TRACT SYMPTOMS: ICD-10-CM

## 2023-02-20 DIAGNOSIS — I48.19 PERSISTENT ATRIAL FIBRILLATION (HCC): ICD-10-CM

## 2023-02-20 DIAGNOSIS — I50.32 CHRONIC DIASTOLIC CONGESTIVE HEART FAILURE (HCC): ICD-10-CM

## 2023-02-20 DIAGNOSIS — L82.1 SEBORRHEIC KERATOSIS: ICD-10-CM

## 2023-02-20 DIAGNOSIS — K59.01 SLOW TRANSIT CONSTIPATION: ICD-10-CM

## 2023-02-20 PROCEDURE — 1036F TOBACCO NON-USER: CPT | Performed by: HOSPITALIST

## 2023-02-20 PROCEDURE — 1123F ACP DISCUSS/DSCN MKR DOCD: CPT | Performed by: HOSPITALIST

## 2023-02-20 PROCEDURE — 99214 OFFICE O/P EST MOD 30 MIN: CPT | Performed by: HOSPITALIST

## 2023-02-20 PROCEDURE — 3078F DIAST BP <80 MM HG: CPT | Performed by: HOSPITALIST

## 2023-02-20 PROCEDURE — G8417 CALC BMI ABV UP PARAM F/U: HCPCS | Performed by: HOSPITALIST

## 2023-02-20 PROCEDURE — G8427 DOCREV CUR MEDS BY ELIG CLIN: HCPCS | Performed by: HOSPITALIST

## 2023-02-20 PROCEDURE — G8484 FLU IMMUNIZE NO ADMIN: HCPCS | Performed by: HOSPITALIST

## 2023-02-20 PROCEDURE — 3075F SYST BP GE 130 - 139MM HG: CPT | Performed by: HOSPITALIST

## 2023-02-20 RX ORDER — ZOLPIDEM TARTRATE 10 MG/1
10 TABLET ORAL NIGHTLY PRN
Qty: 90 TABLET | Refills: 0 | Status: SHIPPED | OUTPATIENT
Start: 2023-02-20 | End: 2023-05-21

## 2023-02-20 RX ORDER — HYDROCODONE BITARTRATE AND ACETAMINOPHEN 5; 325 MG/1; MG/1
1 TABLET ORAL 2 TIMES DAILY PRN
Qty: 30 TABLET | Refills: 0 | Status: SHIPPED | OUTPATIENT
Start: 2023-02-20 | End: 2023-03-07

## 2023-02-20 RX ORDER — AMOXICILLIN 250 MG
2 CAPSULE ORAL DAILY
Qty: 60 TABLET | Refills: 2 | Status: SHIPPED | OUTPATIENT
Start: 2023-02-20 | End: 2023-03-22

## 2023-02-20 SDOH — ECONOMIC STABILITY: INCOME INSECURITY: HOW HARD IS IT FOR YOU TO PAY FOR THE VERY BASICS LIKE FOOD, HOUSING, MEDICAL CARE, AND HEATING?: NOT HARD AT ALL

## 2023-02-20 SDOH — ECONOMIC STABILITY: HOUSING INSECURITY
IN THE LAST 12 MONTHS, WAS THERE A TIME WHEN YOU DID NOT HAVE A STEADY PLACE TO SLEEP OR SLEPT IN A SHELTER (INCLUDING NOW)?: NO

## 2023-02-20 SDOH — ECONOMIC STABILITY: FOOD INSECURITY: WITHIN THE PAST 12 MONTHS, THE FOOD YOU BOUGHT JUST DIDN'T LAST AND YOU DIDN'T HAVE MONEY TO GET MORE.: NEVER TRUE

## 2023-02-20 SDOH — ECONOMIC STABILITY: FOOD INSECURITY: WITHIN THE PAST 12 MONTHS, YOU WORRIED THAT YOUR FOOD WOULD RUN OUT BEFORE YOU GOT MONEY TO BUY MORE.: NEVER TRUE

## 2023-02-20 ASSESSMENT — PATIENT HEALTH QUESTIONNAIRE - PHQ9
SUM OF ALL RESPONSES TO PHQ QUESTIONS 1-9: 0
2. FEELING DOWN, DEPRESSED OR HOPELESS: 0
SUM OF ALL RESPONSES TO PHQ QUESTIONS 1-9: 0
SUM OF ALL RESPONSES TO PHQ9 QUESTIONS 1 & 2: 0
SUM OF ALL RESPONSES TO PHQ QUESTIONS 1-9: 0
1. LITTLE INTEREST OR PLEASURE IN DOING THINGS: 0
SUM OF ALL RESPONSES TO PHQ QUESTIONS 1-9: 0

## 2023-02-20 NOTE — PROGRESS NOTES
Follow Up Visit Established Patient Visit    Patient:  Shira Irene                                               : 1940  Age: 81 y.o. MRN: [id number]  Date : 2023    Shira Irene is a 81 y.o. male who presents for : Scheduled follow-up appointment    Chief Complaint   Patient presents with    Hypertension    Hyperlipidemia            Reports insomnia ( falling asleep). Takes Ambien as needed with good symptom control.+ urinary frequency ( wakes up 3-4 times/ night to urinate). Underwent HOLEP in 2022 ( Dr Jane Anaya). Myrbetriq provides partial symptom relief. Will have an appointment with Dr Igor Maldonado, urology, next week. Reports pain in both hands and feet. Takes small amount of hydrocodone as needed, applies OTC creams as needed. Tries to adhere to low purine diet. Checks his BP twice/ day; SBP ranges 117-160 mm Hg. No heart palpitations. + constipation; + occasional rectal bleed. Walks for about 1.5 hour daily. No recent chest pain. Follows up with Dr. Raphael Holbrook for CAD and Dr Shayy Dsouza for a fib. Underwent direct-current cardioversion in the past with significant improvement of his symptoms. Tolerates Eliquis well.     Past Medical History:   Diagnosis Date    Contact dermatitis and other eczema, due to unspecified cause     Coronary atherosclerosis of unspecified type of vessel, native or graft     Cardio- Dr. Brooks Nicole    Diastolic CHF [facility])     Gout, unspecified     Hypertension     Hypertrophy of prostate without urinary obstruction and other lower urinary tract symptoms (LUTS)     Other and unspecified hyperlipidemia     Peripheral vascular complications        Past Surgical History:   Procedure Laterality Date    CARDIAC SURGERY      4 vessel cabg Dr. Kim Mercado y    COLONOSCOPY  07    Dr. Kandy Krabbe 2012    COLONOSCOPY  5.23.13    Estrella Quiroz MD polyp repeat 2018    COLONOSCOPY  2018    Dr. Amari Martines polyp removed no repeat necessary    CORONARY ARTERY BYPASS GRAFT  2003    Dr. Ariel León       Current Outpatient Medications   Medication Sig Dispense Refill    zolpidem (AMBIEN) 10 MG tablet Take 1 tablet by mouth nightly as needed for Sleep for up to 90 days. Max Daily Amount: 10 mg 90 tablet 0    HYDROcodone-acetaminophen (NORCO) 5-325 MG per tablet Take 1 tablet by mouth 2 times daily as needed for Pain for up to 15 days. Intended supply: 7 days. Take lowest dose possible to manage pain Max Daily Amount: 2 tablets 30 tablet 0    senna-docusate (SENOKOT S) 8.6-50 MG per tablet Take 2 tablets by mouth daily 60 tablet 2    atorvastatin (LIPITOR) 40 MG tablet Take 1 tablet by mouth daily 90 tablet 1    allopurinol (ZYLOPRIM) 300 MG tablet TAKE ONE TABLET BY MOUTH DAILY 90 tablet 1    aspirin EC 81 MG EC tablet Take 1 tablet by mouth daily 90 tablet 3    amLODIPine (NORVASC) 2.5 MG tablet Take 1 tablet by mouth daily 90 tablet 1    hydroCHLOROthiazide (HYDRODIURIL) 25 MG tablet Take 1 tablet by mouth daily 90 tablet 1    furosemide (LASIX) 40 MG tablet Take 1 tablet by mouth daily 90 tablet 3    ELIQUIS 5 MG TABS tablet TAKE 1 TABLET BY MOUTH TWICE DAILY 180 tablet 3    mirabegron (MYRBETRIQ) 50 MG TB24 Take 50 mg by mouth daily      HYDROcodone-acetaminophen (NORCO) 5-325 MG per tablet Take 1 tablet by mouth every 6 hours as needed. meclizine (ANTIVERT) 12.5 MG tablet Take 12.5 mg by mouth 3 times daily as needed      nitroGLYCERIN (NITROSTAT) 0.4 MG SL tablet Place 1 tablet under the tongue every 5 minutes as needed for Chest pain up to max of 3 total doses. If no relief after 1 dose, call 911. 25 tablet 1    famotidine (PEPCID) 20 MG tablet Take 1 tablet by mouth 2 times daily 60 tablet 3    isosorbide mononitrate (IMDUR) 60 MG extended release tablet Take 60 mg by mouth in the morning and at bedtime      colchicine (COLCRYS) 0.6 MG tablet TAKE ONE TABLET BY MOUTH DAILY 90 tablet 1    vitamin D (CHOLECALCIFEROL) 1000 UNIT TABS tablet Take 2,000 Int'l Units by mouth daily. No current facility-administered medications for this visit. /70 (Site: Left Upper Arm, Position: Sitting, Cuff Size: Large Adult)   Pulse 59   Temp 98.4 °F (36.9 °C) (Temporal)   Ht 5' 5.5\" (1.664 m)   Wt 211 lb 6.4 oz (95.9 kg)   SpO2 98%   BMI 34.64 kg/m²     Physical Exam   Physical Exam  Vitals and nursing note reviewed. Constitutional:       General: He is not in acute distress. Appearance: Normal appearance. He is well-developed. HENT:      Head: Normocephalic and atraumatic. Right Ear: Tympanic membrane, ear canal and external ear normal. There is no impacted cerumen. Left Ear: Tympanic membrane, ear canal and external ear normal. There is no impacted cerumen. Nose:      Comments: Nasal mucosa is moderately swollen and mildly inflamed     Mouth/Throat:      Mouth: Mucous membranes are moist.      Pharynx: Oropharynx is clear. No oropharyngeal exudate or posterior oropharyngeal erythema. Eyes:      General: No scleral icterus. Extraocular Movements: Extraocular movements intact. Conjunctiva/sclera: Conjunctivae normal.      Pupils: Pupils are equal, round, and reactive to light. Neck:      Vascular: No carotid bruit or JVD. Cardiovascular:      Rate and Rhythm: Normal rate. Rhythm irregular. Heart sounds: Normal heart sounds. No murmur heard. No friction rub. No gallop. Pulmonary:      Effort: Pulmonary effort is normal. No respiratory distress. Breath sounds: Normal breath sounds. No wheezing or rales. Abdominal:      General: Bowel sounds are normal. There is no distension. Palpations: Abdomen is soft. Tenderness: There is no abdominal tenderness. There is no right CVA tenderness or left CVA tenderness. Musculoskeletal:         General: No tenderness. Normal range of motion. Cervical back: Normal range of motion and neck supple. Right lower leg: No edema. Left lower leg: No edema.    Lymphadenopathy: Cervical: No cervical adenopathy. Skin:     General: Skin is warm and dry. Capillary Refill: Capillary refill takes less than 2 seconds. Findings: Lesion (Multiple lesions of actinic keratosis on lower neck, chest, and back) present. No erythema or rash. Neurological:      General: No focal deficit present. Mental Status: He is alert and oriented to person, place, and time. Cranial Nerves: No cranial nerve deficit. Sensory: No sensory deficit. Gait: Gait normal.      Deep Tendon Reflexes: Reflexes normal.   Psychiatric:         Mood and Affect: Mood normal.         Behavior: Behavior normal.       Assessment/ plan:     Sebastian Schmitz was seen today for hypertension and hyperlipidemia. Diagnoses and all orders for this visit:    Primary insomnia  -     zolpidem (AMBIEN) 10 MG tablet; Take 1 tablet by mouth nightly as needed for Sleep for up to 90 days. Max Daily Amount: 10 mg    Chronic diastolic congestive heart failure (HCC)         -    Compensated; continue current medication    Persistent atrial fibrillation (HCC)          -    Rate is controlled; continue Eliquis 5 mg orally twice a day          -   Keep scheduled follow-up appointments with Dr. Sergey Chris    Atherosclerosis of native coronary artery of native heart with other form of angina pectoris ([facility]          -    Stable; keep scheduled follow-up appointment with Dr. Kisha Ramirez         -     Sveta Campbell with regular exercise         -     Continue current medication    Primary osteoarthritis involving multiple joints  -     HYDROcodone-acetaminophen (NORCO) 5-325 MG per tablet; Take 1 tablet by mouth 2 times daily as needed for Pain for up to 15 days. Intended supply: 7 days. Take lowest dose possible to manage pain Max Daily Amount: 2 tablets    Slow transit constipation  -     senna-docusate (SENOKOT S) 8.6-50 MG per tablet;  Take 2 tablets by mouth daily    Seborrheic keratosis  -     Taylor Mott MD, Dermatology, [facility]    History of colonic polyps  -     AFL - Nazario Dukes MD, Gastroenterology, [facility]    Essential hypertension        -     Controlled continue current medication    Chronic gout without tophus, unspecified cause, unspecified site         -    Relatively well-controlled; continue to adhere to low purine diet and stay well-hydrated         -    Continue with allopurinol 300 mg daily    Benign non-nodular prostatic hyperplasia with lower urinary tract symptoms          -    Keep scheduled follow-up appointments with Dr. Zelda Benites MD, urology          -    Continue therapy with mirabegron    Return in about 3 months (around 5/20/2023) for HTN, CHF, constipation, gout.     Michelle Garnica MD

## 2023-03-29 ENCOUNTER — HOSPITAL ENCOUNTER (OUTPATIENT)
Dept: GENERAL RADIOLOGY | Age: 83
Discharge: HOME | End: 2023-03-29
Payer: COMMERCIAL

## 2023-03-29 ENCOUNTER — OFFICE VISIT (OUTPATIENT)
Dept: INTERNAL MEDICINE CLINIC | Age: 83
End: 2023-03-29

## 2023-03-29 VITALS
DIASTOLIC BLOOD PRESSURE: 68 MMHG | OXYGEN SATURATION: 98 % | SYSTOLIC BLOOD PRESSURE: 138 MMHG | WEIGHT: 206 LBS | HEART RATE: 68 BPM | BODY MASS INDEX: 33.76 KG/M2 | TEMPERATURE: 98.1 F

## 2023-03-29 DIAGNOSIS — R06.02 SOB (SHORTNESS OF BREATH): ICD-10-CM

## 2023-03-29 DIAGNOSIS — B35.1 ONYCHOMYCOSIS: ICD-10-CM

## 2023-03-29 DIAGNOSIS — R06.02 SOB (SHORTNESS OF BREATH): Primary | ICD-10-CM

## 2023-03-29 PROCEDURE — 71046 X-RAY EXAM CHEST 2 VIEWS: CPT

## 2023-03-29 RX ORDER — AZITHROMYCIN 250 MG/1
250 TABLET, FILM COATED ORAL SEE ADMIN INSTRUCTIONS
Qty: 6 TABLET | Refills: 0 | Status: SHIPPED | OUTPATIENT
Start: 2023-03-29 | End: 2023-04-03

## 2023-03-29 RX ORDER — PREDNISONE 20 MG/1
20 TABLET ORAL DAILY
Qty: 5 TABLET | Refills: 0 | Status: SHIPPED | OUTPATIENT
Start: 2023-03-29 | End: 2023-04-03

## 2023-03-29 NOTE — PROGRESS NOTES
(AMBIEN) 10 MG tablet Take 1 tablet by mouth nightly as needed for Sleep for up to 90 days. Max Daily Amount: 10 mg 90 tablet 0    atorvastatin (LIPITOR) 40 MG tablet Take 1 tablet by mouth daily 90 tablet 1    allopurinol (ZYLOPRIM) 300 MG tablet TAKE ONE TABLET BY MOUTH DAILY 90 tablet 1    aspirin EC 81 MG EC tablet Take 1 tablet by mouth daily 90 tablet 3    amLODIPine (NORVASC) 2.5 MG tablet Take 1 tablet by mouth daily 90 tablet 1    hydroCHLOROthiazide (HYDRODIURIL) 25 MG tablet Take 1 tablet by mouth daily 90 tablet 1    furosemide (LASIX) 40 MG tablet Take 1 tablet by mouth daily 90 tablet 3    ELIQUIS 5 MG TABS tablet TAKE 1 TABLET BY MOUTH TWICE DAILY 180 tablet 3    mirabegron (MYRBETRIQ) 50 MG TB24 Take 50 mg by mouth daily      HYDROcodone-acetaminophen (NORCO) 5-325 MG per tablet Take 1 tablet by mouth every 6 hours as needed. meclizine (ANTIVERT) 12.5 MG tablet Take 12.5 mg by mouth 3 times daily as needed      nitroGLYCERIN (NITROSTAT) 0.4 MG SL tablet Place 1 tablet under the tongue every 5 minutes as needed for Chest pain up to max of 3 total doses. If no relief after 1 dose, call 911. 25 tablet 1    famotidine (PEPCID) 20 MG tablet Take 1 tablet by mouth 2 times daily 60 tablet 3    isosorbide mononitrate (IMDUR) 60 MG extended release tablet Take 60 mg by mouth in the morning and at bedtime      colchicine (COLCRYS) 0.6 MG tablet TAKE ONE TABLET BY MOUTH DAILY 90 tablet 1    vitamin D (CHOLECALCIFEROL) 1000 UNIT TABS tablet Take 2,000 Int'l Units by mouth daily. No current facility-administered medications for this visit. /68   Pulse 68   Temp 98.1 °F (36.7 °C) (Temporal)   Wt 206 lb (93.4 kg)   SpO2 98%   BMI 33.76 kg/m²     Physical Exam   Physical Exam  Vitals and nursing note reviewed. Constitutional:       General: He is not in acute distress. Appearance: Normal appearance. He is well-developed. HENT:      Head: Normocephalic and atraumatic.       Right

## 2023-04-20 ENCOUNTER — OFFICE VISIT (OUTPATIENT)
Dept: CARDIOLOGY CLINIC | Age: 83
End: 2023-04-20
Payer: COMMERCIAL

## 2023-04-20 VITALS
BODY MASS INDEX: 34.41 KG/M2 | WEIGHT: 210 LBS | SYSTOLIC BLOOD PRESSURE: 140 MMHG | DIASTOLIC BLOOD PRESSURE: 60 MMHG | HEART RATE: 68 BPM

## 2023-04-20 DIAGNOSIS — I50.32 CHRONIC DIASTOLIC CONGESTIVE HEART FAILURE (HCC): ICD-10-CM

## 2023-04-20 DIAGNOSIS — I48.19 PERSISTENT ATRIAL FIBRILLATION (HCC): Primary | ICD-10-CM

## 2023-04-20 DIAGNOSIS — I25.10 CAD IN NATIVE ARTERY: ICD-10-CM

## 2023-04-20 DIAGNOSIS — R00.1 BRADYCARDIA: ICD-10-CM

## 2023-04-20 DIAGNOSIS — I10 PRIMARY HYPERTENSION: ICD-10-CM

## 2023-04-20 DIAGNOSIS — Z95.1 HX OF CABG: ICD-10-CM

## 2023-04-20 PROCEDURE — 1123F ACP DISCUSS/DSCN MKR DOCD: CPT | Performed by: INTERNAL MEDICINE

## 2023-04-20 PROCEDURE — 1036F TOBACCO NON-USER: CPT | Performed by: INTERNAL MEDICINE

## 2023-04-20 PROCEDURE — G8427 DOCREV CUR MEDS BY ELIG CLIN: HCPCS | Performed by: INTERNAL MEDICINE

## 2023-04-20 PROCEDURE — 3078F DIAST BP <80 MM HG: CPT | Performed by: INTERNAL MEDICINE

## 2023-04-20 PROCEDURE — G8417 CALC BMI ABV UP PARAM F/U: HCPCS | Performed by: INTERNAL MEDICINE

## 2023-04-20 PROCEDURE — 3077F SYST BP >= 140 MM HG: CPT | Performed by: INTERNAL MEDICINE

## 2023-04-20 PROCEDURE — 99214 OFFICE O/P EST MOD 30 MIN: CPT | Performed by: INTERNAL MEDICINE

## 2023-04-20 RX ORDER — AMLODIPINE BESYLATE 2.5 MG/1
2.5 TABLET ORAL DAILY
Qty: 90 TABLET | Refills: 1 | Status: SHIPPED | OUTPATIENT
Start: 2023-04-20

## 2023-04-20 ASSESSMENT — ENCOUNTER SYMPTOMS
CHOKING: 0
APNEA: 0
ABDOMINAL DISTENTION: 0
CHEST TIGHTNESS: 0
COUGH: 0
SHORTNESS OF BREATH: 0

## 2023-04-20 NOTE — PROGRESS NOTES
breath sounds. No wheezing or rales. Abdominal:      General: Bowel sounds are normal.      Palpations: Abdomen is soft. Tenderness: There is no abdominal tenderness. Musculoskeletal:         General: Normal range of motion. Cervical back: Normal range of motion and neck supple. Skin:     General: Skin is warm and dry. Neurological:      General: No focal deficit present. Mental Status: He is alert and oriented to person, place, and time. Psychiatric:         Mood and Affect: Mood normal.         Behavior: Behavior normal.       Assessment:       Diagnosis Orders   1. Persistent atrial fibrillation (HCC) -s/p ECV       2. Chronic diastolic congestive heart failure ([address]        3. CAD in native artery        4. Hx of CABG        5. Bradycardia                Plan:      CV stable. EP following. s/p ECV. No cp. Some sob but not much worse. BP good. CHF compensated. On AC , no bleeding issues. Continue norvasc/lipitor/HCTZ/Imdur/Lasix for CHF/afib/CAD. Prn amlodipine. EP following. No changes. Continue to monitor. Follow up 3 months.         Herman Stein MD

## 2023-04-24 RX ORDER — HYDROCHLOROTHIAZIDE 25 MG/1
25 TABLET ORAL DAILY
Qty: 90 TABLET | Refills: 1 | Status: SHIPPED | OUTPATIENT
Start: 2023-04-24

## 2023-05-07 ENCOUNTER — HOSPITAL ENCOUNTER (EMERGENCY)
Age: 83
Discharge: HOME | End: 2023-05-07

## 2023-05-07 VITALS
HEART RATE: 75 BPM | BODY MASS INDEX: 33.36 KG/M2 | SYSTOLIC BLOOD PRESSURE: 152 MMHG | HEIGHT: 67 IN | RESPIRATION RATE: 14 BRPM | WEIGHT: 212.52 LBS | OXYGEN SATURATION: 97 % | TEMPERATURE: 97.7 F | DIASTOLIC BLOOD PRESSURE: 82 MMHG

## 2023-05-07 ASSESSMENT — PAIN - FUNCTIONAL ASSESSMENT: PAIN_FUNCTIONAL_ASSESSMENT: NONE - DENIES PAIN

## 2023-05-22 ENCOUNTER — OFFICE VISIT (OUTPATIENT)
Dept: INTERNAL MEDICINE CLINIC | Age: 83
End: 2023-05-22

## 2023-05-22 VITALS
OXYGEN SATURATION: 97 % | BODY MASS INDEX: 35.29 KG/M2 | WEIGHT: 211.8 LBS | HEIGHT: 65 IN | DIASTOLIC BLOOD PRESSURE: 80 MMHG | HEART RATE: 66 BPM | SYSTOLIC BLOOD PRESSURE: 136 MMHG | TEMPERATURE: 97.8 F

## 2023-05-22 DIAGNOSIS — E66.01 SEVERE OBESITY (BMI 35.0-39.9) WITH COMORBIDITY (HCC): ICD-10-CM

## 2023-05-22 DIAGNOSIS — E78.2 MIXED HYPERLIPIDEMIA: ICD-10-CM

## 2023-05-22 DIAGNOSIS — N40.1 BENIGN NON-NODULAR PROSTATIC HYPERPLASIA WITH LOWER URINARY TRACT SYMPTOMS: ICD-10-CM

## 2023-05-22 DIAGNOSIS — R73.9 HYPERGLYCEMIA: ICD-10-CM

## 2023-05-22 DIAGNOSIS — Z12.5 PROSTATE CANCER SCREENING: ICD-10-CM

## 2023-05-22 DIAGNOSIS — M15.9 PRIMARY OSTEOARTHRITIS INVOLVING MULTIPLE JOINTS: ICD-10-CM

## 2023-05-22 DIAGNOSIS — F51.01 PRIMARY INSOMNIA: ICD-10-CM

## 2023-05-22 DIAGNOSIS — I10 ESSENTIAL HYPERTENSION: Primary | ICD-10-CM

## 2023-05-22 DIAGNOSIS — B35.1 ONYCHOMYCOSIS: ICD-10-CM

## 2023-05-22 DIAGNOSIS — I50.32 CHRONIC DIASTOLIC CONGESTIVE HEART FAILURE (HCC): ICD-10-CM

## 2023-05-22 RX ORDER — ISOSORBIDE MONONITRATE 60 MG/1
60 TABLET, EXTENDED RELEASE ORAL 2 TIMES DAILY
Qty: 180 TABLET | Refills: 1 | Status: SHIPPED | OUTPATIENT
Start: 2023-05-22

## 2023-05-22 RX ORDER — TERBINAFINE HYDROCHLORIDE 250 MG/1
250 TABLET ORAL DAILY
Qty: 30 TABLET | Refills: 1 | Status: SHIPPED | OUTPATIENT
Start: 2023-05-22 | End: 2023-07-03

## 2023-05-22 RX ORDER — HYDROCODONE BITARTRATE AND ACETAMINOPHEN 5; 325 MG/1; MG/1
1 TABLET ORAL DAILY PRN
Qty: 10 TABLET | Refills: 0 | Status: SHIPPED | OUTPATIENT
Start: 2023-05-22 | End: 2023-06-21

## 2023-05-22 ASSESSMENT — PATIENT HEALTH QUESTIONNAIRE - PHQ9
SUM OF ALL RESPONSES TO PHQ QUESTIONS 1-9: 0
2. FEELING DOWN, DEPRESSED OR HOPELESS: 0
SUM OF ALL RESPONSES TO PHQ9 QUESTIONS 1 & 2: 0
SUM OF ALL RESPONSES TO PHQ QUESTIONS 1-9: 0
1. LITTLE INTEREST OR PLEASURE IN DOING THINGS: 0

## 2023-05-22 NOTE — PROGRESS NOTES
Follow Up Visit Established Patient Visit    Patient:  Shae Mcneil                                               : 1940  Age: 81 y.o. MRN: [id number]  Date : 2023    Shae Mcneil is a 81 y.o. male who presents for : Scheduled follow-up appointment    Chief Complaint   Patient presents with    Hypertension    Hyperlipidemia            Checks his BP twice daily; SBP ranges 135-150 mm Hg. + occasional pressure like chest discomforted. Reports chronic lower back and bilateral knee pain. Takes hydrocodone as needed with good symptom relief. .  Reports insomnia, especially with worsening of generalized body aches  Tries to remain active. Takes care of his ADLs. Does not feel depressed. No recent falls. Has been using ciclopirox 8.4% nail polish without any improvement of multiple fingernail onychomycosis.     Past Medical History:   Diagnosis Date    Contact dermatitis and other eczema, due to unspecified cause     Coronary atherosclerosis of unspecified type of vessel, native or graft     Cardio- Dr. Fermin Kent    Diastolic [facility])     Gout, unspecified     Hypertension     Hypertrophy of prostate without urinary obstruction and other lower urinary tract symptoms (LUTS)     Other and unspecified hyperlipidemia     Peripheral vascular complications        Past Surgical History:   Procedure Laterality Date    CARDIAC SURGERY      4 vessel cabg Dr. Yasmine Mcintyre y    COLONOSCOPY  07    Dr. Yandy Gonzalez 2012    COLONOSCOPY  5.23.13    Amber Austin MD polyp repeat     COLONOSCOPY  2018    Dr. Aida Bowens polyp removed no repeat necessary    [address] [facility]      Dr. Nohemi Toribio       Current Outpatient Medications   Medication Sig Dispense Refill    isosorbide mononitrate (IMDUR) 60 MG extended release tablet Take 1 tablet by mouth in the morning and at bedtime 180 tablet 1    HYDROcodone-acetaminophen (NORCO) 5-325 MG per tablet Take 1 tablet by mouth daily as needed for Pain for up to

## 2023-06-18 DIAGNOSIS — B35.1 ONYCHOMYCOSIS: ICD-10-CM

## 2023-06-19 RX ORDER — TERBINAFINE HYDROCHLORIDE 250 MG/1
250 TABLET ORAL DAILY
Qty: 30 TABLET | Refills: 1 | Status: SHIPPED | OUTPATIENT
Start: 2023-06-19 | End: 2023-07-31

## 2023-07-22 DIAGNOSIS — B35.1 ONYCHOMYCOSIS: ICD-10-CM

## 2023-07-24 RX ORDER — TERBINAFINE HYDROCHLORIDE 250 MG/1
250 TABLET ORAL DAILY
Qty: 30 TABLET | Refills: 1 | Status: SHIPPED | OUTPATIENT
Start: 2023-07-24 | End: 2023-09-04

## 2023-07-26 ENCOUNTER — OFFICE VISIT (OUTPATIENT)
Dept: CARDIOLOGY CLINIC | Age: 83
End: 2023-07-26
Payer: COMMERCIAL

## 2023-07-26 VITALS
HEART RATE: 66 BPM | BODY MASS INDEX: 35.82 KG/M2 | SYSTOLIC BLOOD PRESSURE: 104 MMHG | DIASTOLIC BLOOD PRESSURE: 68 MMHG | HEIGHT: 65 IN | WEIGHT: 215 LBS

## 2023-07-26 DIAGNOSIS — R00.1 BRADYCARDIA: ICD-10-CM

## 2023-07-26 DIAGNOSIS — I25.10 CAD IN NATIVE ARTERY: ICD-10-CM

## 2023-07-26 DIAGNOSIS — Z95.1 HX OF CABG: ICD-10-CM

## 2023-07-26 DIAGNOSIS — I50.32 CHRONIC DIASTOLIC CONGESTIVE HEART FAILURE (HCC): ICD-10-CM

## 2023-07-26 DIAGNOSIS — I48.19 PERSISTENT ATRIAL FIBRILLATION (HCC): Primary | ICD-10-CM

## 2023-07-26 PROCEDURE — 99214 OFFICE O/P EST MOD 30 MIN: CPT | Performed by: INTERNAL MEDICINE

## 2023-07-26 PROCEDURE — 3074F SYST BP LT 130 MM HG: CPT | Performed by: INTERNAL MEDICINE

## 2023-07-26 PROCEDURE — 3078F DIAST BP <80 MM HG: CPT | Performed by: INTERNAL MEDICINE

## 2023-07-26 PROCEDURE — 1123F ACP DISCUSS/DSCN MKR DOCD: CPT | Performed by: INTERNAL MEDICINE

## 2023-07-26 ASSESSMENT — ENCOUNTER SYMPTOMS
COUGH: 0
CHEST TIGHTNESS: 0
APNEA: 0
CHOKING: 0
SHORTNESS OF BREATH: 0
ABDOMINAL DISTENTION: 0

## 2023-07-26 NOTE — PROGRESS NOTES
Subjective:      Patient ID: Danielito Montilla is a 81 y.o. male. HPI  Follow up for afib/cad/cabg/chf/bradycardia. No cp/sob. No palp/tachy. S/pECV per EP. Some sob with exertion and bending over. Walks dog 3x per day. No syncope. No edema. No pnd or orthopnea. On AC.  No bleeding issues     Past Medical History:   Diagnosis Date    Contact dermatitis and other eczema, due to unspecified cause     Coronary atherosclerosis of unspecified type of vessel, native or graft     Cardio- Dr. Aurora Hull    Diastolic CHF [facility])     Gout, unspecified     Hypertension     Hypertrophy of prostate without urinary obstruction and other lower urinary tract symptoms (LUTS)     Other and unspecified hyperlipidemia     Peripheral vascular complications      Past Surgical History:   Procedure Laterality Date    CARDIAC SURGERY      4 vessel cabg Dr. Vignesh Corey y    COLONOSCOPY  07    Dr. Jennifer Broussard 2012    COLONOSCOPY  5.23.13    Cuco Medrano MD polyp repeat 2018    COLONOSCOPY  2018    Dr. Marisa Cunningham polyp removed no repeat necessary    CORONARY ARTERY BYPASS GRAFT      Dr. Cora Moraes History     Socioeconomic History    Marital status:      Spouse name: Not on file    Number of children: Not on file    Years of education: Not on file    Highest education level: Not on file   Occupational History    Not on file   Tobacco Use    Smoking status: Former     Packs/day: 1.50     Years: 50.00     Pack years: 75.00     Types: Cigarettes     Quit date: 2003     Years since quittin.5    Smokeless tobacco: Former   Substance and Sexual Activity    Alcohol use: Yes     Comment: Seldom    Drug use: No    Sexual activity: Yes     Partners: Female   Other Topics Concern    Not on file   Social History Narrative    Not on file     Social Determinants of Health     Financial Resource Strain: Low Risk     Difficulty of Paying Living Expenses: Not hard at all   Food Insecurity: No Food Insecurity    Worried About

## 2023-08-08 RX ORDER — FUROSEMIDE 40 MG/1
40 TABLET ORAL DAILY
Qty: 90 TABLET | Refills: 1 | Status: SHIPPED | OUTPATIENT
Start: 2023-08-08

## 2023-08-10 DIAGNOSIS — Z12.5 PROSTATE CANCER SCREENING: ICD-10-CM

## 2023-08-10 DIAGNOSIS — E78.2 MIXED HYPERLIPIDEMIA: ICD-10-CM

## 2023-08-10 DIAGNOSIS — N40.1 BENIGN NON-NODULAR PROSTATIC HYPERPLASIA WITH LOWER URINARY TRACT SYMPTOMS: ICD-10-CM

## 2023-08-10 DIAGNOSIS — I10 ESSENTIAL HYPERTENSION: ICD-10-CM

## 2023-08-10 DIAGNOSIS — R73.9 HYPERGLYCEMIA: ICD-10-CM

## 2023-08-10 LAB
BASOPHILS # BLD: 0 K/UL (ref 0–0.2)
BASOPHILS NFR BLD: 0.2 %
DEPRECATED RDW RBC AUTO: 15.9 % (ref 12.4–15.4)
EOSINOPHIL # BLD: 0.2 K/UL (ref 0–0.6)
EOSINOPHIL NFR BLD: 3.2 %
HCT VFR BLD AUTO: 40.6 % (ref 40.5–52.5)
HGB BLD-MCNC: 13.5 G/DL (ref 13.5–17.5)
LYMPHOCYTES # BLD: 1 K/UL (ref 1–5.1)
LYMPHOCYTES NFR BLD: 16.7 %
MCH RBC QN AUTO: 28.3 PG (ref 26–34)
MCHC RBC AUTO-ENTMCNC: 33.2 G/DL (ref 31–36)
MCV RBC AUTO: 85.3 FL (ref 80–100)
MONOCYTES # BLD: 0.6 K/UL (ref 0–1.3)
MONOCYTES NFR BLD: 10.1 %
NEUTROPHILS # BLD: 4.3 K/UL (ref 1.7–7.7)
NEUTROPHILS NFR BLD: 69.8 %
PLATELET # BLD AUTO: 202 K/UL (ref 135–450)
PMV BLD AUTO: 8.5 FL (ref 5–10.5)
PSA SERPL DL<=0.01 NG/ML-MCNC: 0.43 NG/ML (ref 0–4)
RBC # BLD AUTO: 4.76 M/UL (ref 4.2–5.9)
TSH SERPL DL<=0.005 MIU/L-ACNC: 3.32 UIU/ML (ref 0.27–4.2)
WBC # BLD AUTO: 6.2 K/UL (ref 4–11)

## 2023-08-11 LAB
ALBUMIN SERPL-MCNC: 4.3 G/DL (ref 3.4–5)
ALBUMIN/GLOB SERPL: 2 {RATIO} (ref 1.1–2.2)
ALP SERPL-CCNC: 65 U/L (ref 40–129)
ALT SERPL-CCNC: 15 U/L (ref 10–40)
ANION GAP SERPL CALCULATED.3IONS-SCNC: 15 MMOL/L (ref 3–16)
AST SERPL-CCNC: 18 U/L (ref 15–37)
BILIRUB SERPL-MCNC: 0.6 MG/DL (ref 0–1)
BUN SERPL-MCNC: 19 MG/DL (ref 7–20)
CALCIUM SERPL-MCNC: 9.8 MG/DL (ref 8.3–10.6)
CHLORIDE SERPL-SCNC: 100 MMOL/L (ref 99–110)
CHOLEST SERPL-MCNC: 137 MG/DL (ref 0–199)
CO2 SERPL-SCNC: 25 MMOL/L (ref 21–32)
CREAT SERPL-MCNC: 1.4 MG/DL (ref 0.8–1.3)
EST. AVERAGE GLUCOSE BLD GHB EST-MCNC: 122.6 MG/DL
GFR SERPLBLD CREATININE-BSD FMLA CKD-EPI: 50 ML/MIN/{1.73_M2}
GLUCOSE SERPL-MCNC: 98 MG/DL (ref 70–99)
HBA1C MFR BLD: 5.9 %
HDLC SERPL-MCNC: 37 MG/DL (ref 40–60)
LDLC SERPL CALC-MCNC: 74 MG/DL
POTASSIUM SERPL-SCNC: 4.2 MMOL/L (ref 3.5–5.1)
PROT SERPL-MCNC: 6.4 G/DL (ref 6.4–8.2)
SODIUM SERPL-SCNC: 140 MMOL/L (ref 136–145)
TRIGL SERPL-MCNC: 130 MG/DL (ref 0–150)
VLDLC SERPL CALC-MCNC: 26 MG/DL

## 2023-08-22 ENCOUNTER — OFFICE VISIT (OUTPATIENT)
Dept: INTERNAL MEDICINE CLINIC | Age: 83
End: 2023-08-22

## 2023-08-22 VITALS
SYSTOLIC BLOOD PRESSURE: 150 MMHG | HEIGHT: 65 IN | BODY MASS INDEX: 35.82 KG/M2 | DIASTOLIC BLOOD PRESSURE: 76 MMHG | WEIGHT: 215 LBS

## 2023-08-22 DIAGNOSIS — I50.32 CHRONIC DIASTOLIC CONGESTIVE HEART FAILURE (HCC): ICD-10-CM

## 2023-08-22 DIAGNOSIS — M15.9 PRIMARY OSTEOARTHRITIS INVOLVING MULTIPLE JOINTS: ICD-10-CM

## 2023-08-22 DIAGNOSIS — M10.9 EXACERBATION OF GOUT: ICD-10-CM

## 2023-08-22 DIAGNOSIS — N18.31 STAGE 3A CHRONIC KIDNEY DISEASE (HCC): ICD-10-CM

## 2023-08-22 DIAGNOSIS — M10.9 EXACERBATION OF GOUT: Primary | ICD-10-CM

## 2023-08-22 DIAGNOSIS — I10 ESSENTIAL HYPERTENSION: ICD-10-CM

## 2023-08-22 PROBLEM — N18.30 CHRONIC RENAL DISEASE, STAGE III (HCC): Status: ACTIVE | Noted: 2023-08-22

## 2023-08-22 RX ORDER — COLCHICINE 0.6 MG/1
0.6 TABLET ORAL DAILY
Qty: 90 TABLET | Refills: 0 | Status: SHIPPED | OUTPATIENT
Start: 2023-08-22 | End: 2023-08-22 | Stop reason: SDUPTHER

## 2023-08-22 RX ORDER — HYDROCODONE BITARTRATE AND ACETAMINOPHEN 5; 325 MG/1; MG/1
1 TABLET ORAL DAILY PRN
Qty: 10 TABLET | Refills: 0 | Status: SHIPPED | OUTPATIENT
Start: 2023-08-22 | End: 2023-09-21

## 2023-08-22 RX ORDER — PREDNISONE 20 MG/1
20 TABLET ORAL DAILY
Qty: 7 TABLET | Refills: 0 | Status: SHIPPED | OUTPATIENT
Start: 2023-08-22 | End: 2023-08-29

## 2023-08-22 RX ORDER — COLCHICINE 0.6 MG/1
0.6 TABLET ORAL DAILY
Qty: 30 TABLET | Refills: 0 | Status: SHIPPED | OUTPATIENT
Start: 2023-08-22

## 2023-08-22 RX ORDER — AMLODIPINE BESYLATE 5 MG/1
5 TABLET ORAL DAILY
Qty: 90 TABLET | Refills: 1 | Status: SHIPPED | OUTPATIENT
Start: 2023-08-22

## 2023-08-22 RX ORDER — COLCHICINE 0.6 MG/1
0.6 TABLET ORAL DAILY
Qty: 30 TABLET | Refills: 3 | Status: SHIPPED | OUTPATIENT
Start: 2023-08-22 | End: 2023-08-22

## 2023-08-22 NOTE — PROGRESS NOTES
Follow Up Visit Established Patient Visit    Patient:  Mundo Conn                                               : 1940  Age: 81 y.o. MRN: [id number]  Date : 2023    Mundo Conn is a 81 y.o. male who presents for : Evaluation of left ankle swelling and erythema for the last 4 to 5 days    Chief Complaint   Patient presents with    Hypertension    Gout     Left foot     Reports swelling and redness of his left ankle in the last 4-5 days. No fever/chills. He has known history of gout. Does not always adhere to low purine diet. Checks his blood pressure at home infrequently. Does not adhere to low-sodium diet. He avoids use of NSAIDs. Tries to stay well-hydrated. Tolerates Eliquis well. Reports occasional left-sided chest pain. Takes nitroglycerin infrequently. Past Medical History:   Diagnosis Date    Contact dermatitis and other eczema, due to unspecified cause     Coronary atherosclerosis of unspecified type of vessel, native or graft     Cardio- Dr. Anderson Larger    Diastolic CHF [facility])     Gout, unspecified     Hypertension     Hypertrophy of prostate without urinary obstruction and other lower urinary tract symptoms (LUTS)     Other and unspecified hyperlipidemia     Peripheral vascular complications        Past Surgical History:   Procedure Laterality Date    CARDIAC SURGERY      4 vessel cabg Dr. Jareth Garcia y    COLONOSCOPY  07    Dr. Marilynn Rossi 2012    COLONOSCOPY  5.23.13    Stone Mercer MD polyp repeat     COLONOSCOPY  2018    Dr. Kolby Reid polyp removed no repeat necessary    CORONARY ARTERY BYPASS GRAFT      Dr. Nuala Cabot       Current Outpatient Medications   Medication Sig Dispense Refill    predniSONE (DELTASONE) 20 MG tablet Take 1 tablet by mouth daily for 7 days 7 tablet 0    HYDROcodone-acetaminophen (NORCO) 5-325 MG per tablet Take 1 tablet by mouth daily as needed for Pain for up to 30 days.  Max Daily Amount: 1 tablet 10 tablet 0    amLODIPine (NORVASC) 5

## 2023-08-24 DIAGNOSIS — M10.9 EXACERBATION OF GOUT: ICD-10-CM

## 2023-08-24 RX ORDER — COLCHICINE 0.6 MG/1
0.6 TABLET ORAL DAILY
Qty: 90 TABLET | OUTPATIENT
Start: 2023-08-24

## 2023-08-31 DIAGNOSIS — B35.1 ONYCHOMYCOSIS: ICD-10-CM

## 2023-09-01 RX ORDER — TERBINAFINE HYDROCHLORIDE 250 MG/1
250 TABLET ORAL DAILY
Qty: 30 TABLET | Refills: 1 | Status: SHIPPED | OUTPATIENT
Start: 2023-09-01 | End: 2023-10-13

## 2023-09-22 ENCOUNTER — OFFICE VISIT (OUTPATIENT)
Dept: INTERNAL MEDICINE CLINIC | Age: 83
End: 2023-09-22

## 2023-09-22 VITALS
OXYGEN SATURATION: 96 % | TEMPERATURE: 98.4 F | HEIGHT: 65 IN | BODY MASS INDEX: 35.52 KG/M2 | SYSTOLIC BLOOD PRESSURE: 130 MMHG | DIASTOLIC BLOOD PRESSURE: 60 MMHG | WEIGHT: 213.2 LBS | HEART RATE: 59 BPM

## 2023-09-22 DIAGNOSIS — M1A.0720 IDIOPATHIC CHRONIC GOUT OF LEFT FOOT WITHOUT TOPHUS: ICD-10-CM

## 2023-09-22 DIAGNOSIS — N18.31 STAGE 3A CHRONIC KIDNEY DISEASE (HCC): ICD-10-CM

## 2023-09-22 DIAGNOSIS — B35.1 ONYCHOMYCOSIS: ICD-10-CM

## 2023-09-22 DIAGNOSIS — I10 ESSENTIAL HYPERTENSION: Primary | ICD-10-CM

## 2023-09-22 RX ORDER — TERBINAFINE HYDROCHLORIDE 250 MG/1
250 TABLET ORAL DAILY
Qty: 90 TABLET | Refills: 0 | Status: SHIPPED | OUTPATIENT
Start: 2023-09-22 | End: 2023-12-21

## 2023-09-22 NOTE — PROGRESS NOTES
MG tablet; Take 1 tablet by mouth daily  -     We will recheck liver function test in 3 months    Idiopathic chronic gout of left foot without tophus  -     Uric Acid; Future         -    Allopurinol 300 mg daily         -    Colchicine 0.6 mg orally daily x1 month, then change to as needed    CKD stage 3a         -    Low-sodium diet         -    Strongly encouraged the patient to avoid use of NSAIDs         -    We will repeat renal function tests in 3 months    Return in about 3 months (around 12/22/2023) for HTN, gout, CKD stage 3a, onychomycosis.     Anurag Kruse MD

## 2023-09-25 ENCOUNTER — TELEPHONE (OUTPATIENT)
Dept: ORTHOPEDIC SURGERY | Age: 83
End: 2023-09-25

## 2023-09-25 NOTE — TELEPHONE ENCOUNTER
Submitted PA for Terbinafine HCl 250MG tablets  [facility] Key: [id number] STATUS: PENDING. Follow up done daily; if no response in three days we will refax for status check. If another three days goes by with no response we will call the insurance for status.

## 2023-09-26 NOTE — TELEPHONE ENCOUNTER
Received APPROVAL for Terbinafine HCl 250MG tablets from 06/27/23 - 09/24/24; approval letter attached. If this requires a response please respond to the pool ( [id number]). Thank you please advise patient.

## 2023-09-29 RX ORDER — HYDROCHLOROTHIAZIDE 25 MG/1
25 TABLET ORAL DAILY
Qty: 90 TABLET | Refills: 1 | Status: SHIPPED | OUTPATIENT
Start: 2023-09-29

## 2023-09-29 NOTE — TELEPHONE ENCOUNTER
Medication:   Requested Prescriptions     Pending Prescriptions Disp Refills    hydroCHLOROthiazide (HYDRODIURIL) 25 MG tablet 90 tablet 1     Sig: Take 1 tablet by mouth daily        Last Filled:      Patient Phone Number: [phone] (home)     Last appt: 9/22/2023   Next appt: 12/20/2023    Last OARRS:       2/20/2023    10:28 AM   RX Monitoring   Periodic Controlled Substance Monitoring Possible medication side effects, risk of tolerance/dependence & alternative treatments discussed. ;No signs of potential drug abuse or diversion identified.

## 2023-10-06 ENCOUNTER — TELEPHONE (OUTPATIENT)
Dept: INTERNAL MEDICINE CLINIC | Age: 83
End: 2023-10-06

## 2023-10-06 NOTE — TELEPHONE ENCOUNTER
[facility] calling Ananth De Los Santos) [phone] needing a     Verbal order given for Home care. Pt complained to nurse:    Ear pain bilateral x ongoing     Tingling in fingertips ongoing for a while    Recommendations? Ananth De Los Santos states pt was not concerned about symptoms. Call patient on Monday with recommendations. [facility] for Monday.

## 2023-10-11 NOTE — TELEPHONE ENCOUNTER
Requested Prescriptions     Pending Prescriptions Disp Refills    apixaban (ELIQUIS) 5 MG TABS tablet 180 tablet 3     Sig: Take 1 tablet by mouth 2 times daily          Last Office Visit: 7/26/2023     Next Office Visit: 10/25/2023

## 2023-10-12 ENCOUNTER — OFFICE VISIT (OUTPATIENT)
Dept: INTERNAL MEDICINE CLINIC | Age: 83
End: 2023-10-12

## 2023-10-12 VITALS
HEIGHT: 65 IN | WEIGHT: 216 LBS | DIASTOLIC BLOOD PRESSURE: 66 MMHG | BODY MASS INDEX: 35.99 KG/M2 | HEART RATE: 58 BPM | SYSTOLIC BLOOD PRESSURE: 156 MMHG

## 2023-10-12 DIAGNOSIS — B35.1 ONYCHOMYCOSIS: Primary | ICD-10-CM

## 2023-10-12 DIAGNOSIS — H92.01 OTALGIA, RIGHT EAR: ICD-10-CM

## 2023-10-12 DIAGNOSIS — D68.69 SECONDARY HYPERCOAGULABLE STATE (HCC): ICD-10-CM

## 2023-10-12 DIAGNOSIS — I48.0 PAROXYSMAL ATRIAL FIBRILLATION (HCC): ICD-10-CM

## 2023-10-12 DIAGNOSIS — I10 ESSENTIAL HYPERTENSION: ICD-10-CM

## 2023-10-12 NOTE — PROGRESS NOTES
Follow Up Visit Established Patient Visit    Patient:  Karla Whitney                                               : 1940  Age: 81 y.o. MRN: [id number]  Date : 10/12/2023    Karla Whitney is a 81 y.o. male who presents for :  follow up appointment    Chief Complaint   Patient presents with    Follow-up     Doing well overall. Reports mild R otalgia. Tolerates terbinafine well. Fingernail and toenail onychomycosis is improving. Takes his scheduled BP meds well.     Past Medical History:   Diagnosis Date    Contact dermatitis and other eczema, due to unspecified cause     Coronary atherosclerosis of unspecified type of vessel, native or graft     Cardio- Dr. Rita Franco    Diastolic CHF [facility])     Gout, unspecified     Hypertension     Hypertrophy of prostate without urinary obstruction and other lower urinary tract symptoms (LUTS)     Other and unspecified hyperlipidemia     Peripheral vascular complications        Past Surgical History:   Procedure Laterality Date    CARDIAC SURGERY      4 vessel cabg Dr. Jorge Luis Lofton y    COLONOSCOPY  07    Dr. Carolynne Dancer 2012    COLONOSCOPY  5.23.13    Natalia Holt MD polyp repeat 2018    COLONOSCOPY  2018    Dr. Lisset Brink polyp removed no repeat necessary    [address]      Dr. Swathi Molina       Current Outpatient Medications   Medication Sig Dispense Refill    apixaban (ELIQUIS) 5 MG TABS tablet Take 1 tablet by mouth 2 times daily 180 tablet 3    hydroCHLOROthiazide (HYDRODIURIL) 25 MG tablet Take 1 tablet by mouth daily 90 tablet 1    terbinafine (LAMISIL) 250 MG tablet Take 1 tablet by mouth daily 90 tablet 0    amLODIPine (NORVASC) 5 MG tablet Take 1 tablet by mouth daily 90 tablet 1    colchicine (COLCRYS) 0.6 MG tablet Take 1 tablet by mouth daily 30 tablet 0    furosemide (LASIX) 40 MG tablet Take 1 tablet by mouth daily 90 tablet 1    isosorbide mononitrate (IMDUR) 60 MG extended release tablet Take 1 tablet by mouth in the morning and at

## 2023-10-25 ENCOUNTER — OFFICE VISIT (OUTPATIENT)
Dept: CARDIOLOGY CLINIC | Age: 83
End: 2023-10-25
Payer: COMMERCIAL

## 2023-10-25 VITALS
DIASTOLIC BLOOD PRESSURE: 70 MMHG | WEIGHT: 217 LBS | BODY MASS INDEX: 36.11 KG/M2 | SYSTOLIC BLOOD PRESSURE: 120 MMHG | HEART RATE: 60 BPM

## 2023-10-25 DIAGNOSIS — I48.19 PERSISTENT ATRIAL FIBRILLATION (HCC): ICD-10-CM

## 2023-10-25 DIAGNOSIS — I50.32 CHRONIC DIASTOLIC CONGESTIVE HEART FAILURE (HCC): Primary | ICD-10-CM

## 2023-10-25 DIAGNOSIS — R00.1 BRADYCARDIA: ICD-10-CM

## 2023-10-25 DIAGNOSIS — I25.10 CAD IN NATIVE ARTERY: ICD-10-CM

## 2023-10-25 DIAGNOSIS — Z95.1 HX OF CABG: ICD-10-CM

## 2023-10-25 DIAGNOSIS — I10 PRIMARY HYPERTENSION: ICD-10-CM

## 2023-10-25 PROCEDURE — 1123F ACP DISCUSS/DSCN MKR DOCD: CPT | Performed by: INTERNAL MEDICINE

## 2023-10-25 PROCEDURE — 99214 OFFICE O/P EST MOD 30 MIN: CPT | Performed by: INTERNAL MEDICINE

## 2023-10-25 PROCEDURE — 3078F DIAST BP <80 MM HG: CPT | Performed by: INTERNAL MEDICINE

## 2023-10-25 PROCEDURE — 3074F SYST BP LT 130 MM HG: CPT | Performed by: INTERNAL MEDICINE

## 2023-10-25 RX ORDER — FUROSEMIDE 80 MG
80 TABLET ORAL DAILY
Qty: 90 TABLET | Refills: 2 | Status: SHIPPED | OUTPATIENT
Start: 2023-10-25

## 2023-10-25 ASSESSMENT — ENCOUNTER SYMPTOMS
APNEA: 0
SHORTNESS OF BREATH: 0
CHEST TIGHTNESS: 0
COUGH: 0
ABDOMINAL DISTENTION: 0
CHOKING: 0

## 2023-10-25 NOTE — PROGRESS NOTES
Subjective:      Patient ID: Anish Torrez is a 81 y.o. male. HPI  Follow up for afib/cad/cabg/chf/bradycardia. No cp. Increase in sob. No palp/tachy. S/pECV per EP. Some sob with exertion and bending over. Walks dog 3x per day. No syncope. No edema. No pnd or orthopnea. On AC.  No bleeding issues     Past Medical History:   Diagnosis Date    Contact dermatitis and other eczema, due to unspecified cause     Coronary atherosclerosis of unspecified type of vessel, native or graft     Cardio- Dr. Devin Drummond    Diastolic CHF [facility])     Gout, unspecified     Hypertension     Hypertrophy of prostate without urinary obstruction and other lower urinary tract symptoms (LUTS)     Other and unspecified hyperlipidemia     Peripheral vascular complications      Past Surgical History:   Procedure Laterality Date    CARDIAC SURGERY      4 vessel cabg Dr. Jarrett Postal y    COLONOSCOPY  07    Dr. José Grewal 2012    COLONOSCOPY  5.23.13    Lai Dow MD polyp repeat 2018    COLONOSCOPY  2018    Dr. Obdulia Shine polyp removed no repeat necessary    CORONARY ARTERY BYPASS GRAFT      Dr. Parnell South History     Socioeconomic History    Marital status:      Spouse name: Not on file    Number of children: Not on file    Years of education: Not on file    Highest education level: Not on file   Occupational History    Not on file   Tobacco Use    Smoking status: Former     Packs/day: 1.50     Years: 50.00     Additional pack years: 0.00     Total pack years: 75.00     Types: Cigarettes     Quit date: 2003     Years since quittin.8    Smokeless tobacco: Former   Substance and Sexual Activity    Alcohol use: Yes     Comment: Seldom    Drug use: No    Sexual activity: Yes     Partners: Female   Other Topics Concern    Not on file   Social History Narrative    Not on file     Social Determinants of Health     Financial Resource Strain: Low Risk  (2023)    Overall Financial Resource Strain (CARDIA)

## 2023-10-26 RX ORDER — AMLODIPINE BESYLATE 2.5 MG/1
2.5 TABLET ORAL DAILY
Qty: 90 TABLET | Refills: 1 | Status: SHIPPED | OUTPATIENT
Start: 2023-10-26

## 2023-11-16 DIAGNOSIS — I50.32 CHRONIC DIASTOLIC CONGESTIVE HEART FAILURE (HCC): ICD-10-CM

## 2023-11-16 DIAGNOSIS — I10 ESSENTIAL HYPERTENSION: ICD-10-CM

## 2023-11-16 DIAGNOSIS — M1A.0720 IDIOPATHIC CHRONIC GOUT OF LEFT FOOT WITHOUT TOPHUS: ICD-10-CM

## 2023-11-16 DIAGNOSIS — I25.10 CAD IN NATIVE ARTERY: ICD-10-CM

## 2023-11-16 DIAGNOSIS — Z95.1 HX OF CABG: ICD-10-CM

## 2023-11-16 DIAGNOSIS — R00.1 BRADYCARDIA: ICD-10-CM

## 2023-11-16 DIAGNOSIS — I48.19 PERSISTENT ATRIAL FIBRILLATION (HCC): ICD-10-CM

## 2023-11-16 LAB
ALBUMIN SERPL-MCNC: 4.5 G/DL (ref 3.4–5)
ALBUMIN/GLOB SERPL: 2.1 {RATIO} (ref 1.1–2.2)
ALP SERPL-CCNC: 85 U/L (ref 40–129)
ALT SERPL-CCNC: 20 U/L (ref 10–40)
ANION GAP SERPL CALCULATED.3IONS-SCNC: 13 MMOL/L (ref 3–16)
AST SERPL-CCNC: 19 U/L (ref 15–37)
BASOPHILS # BLD: 0 K/UL (ref 0–0.2)
BASOPHILS NFR BLD: 0.4 %
BILIRUB SERPL-MCNC: 0.7 MG/DL (ref 0–1)
BUN SERPL-MCNC: 20 MG/DL (ref 7–20)
CALCIUM SERPL-MCNC: 9.5 MG/DL (ref 8.3–10.6)
CHLORIDE SERPL-SCNC: 101 MMOL/L (ref 99–110)
CO2 SERPL-SCNC: 26 MMOL/L (ref 21–32)
CREAT SERPL-MCNC: 1.5 MG/DL (ref 0.8–1.3)
DEPRECATED RDW RBC AUTO: 16.2 % (ref 12.4–15.4)
EOSINOPHIL # BLD: 0.2 K/UL (ref 0–0.6)
EOSINOPHIL NFR BLD: 3.3 %
GFR SERPLBLD CREATININE-BSD FMLA CKD-EPI: 46 ML/MIN/{1.73_M2}
GLUCOSE SERPL-MCNC: 104 MG/DL (ref 70–99)
HCT VFR BLD AUTO: 39.7 % (ref 40.5–52.5)
HGB BLD-MCNC: 12.9 G/DL (ref 13.5–17.5)
LYMPHOCYTES # BLD: 1 K/UL (ref 1–5.1)
LYMPHOCYTES NFR BLD: 14.7 %
MCH RBC QN AUTO: 27.3 PG (ref 26–34)
MCHC RBC AUTO-ENTMCNC: 32.4 G/DL (ref 31–36)
MCV RBC AUTO: 84.4 FL (ref 80–100)
MONOCYTES # BLD: 0.9 K/UL (ref 0–1.3)
MONOCYTES NFR BLD: 13.4 %
NEUTROPHILS # BLD: 4.5 K/UL (ref 1.7–7.7)
NEUTROPHILS NFR BLD: 68.2 %
PLATELET # BLD AUTO: 242 K/UL (ref 135–450)
PMV BLD AUTO: 8.9 FL (ref 5–10.5)
POTASSIUM SERPL-SCNC: 3.7 MMOL/L (ref 3.5–5.1)
PROT SERPL-MCNC: 6.6 G/DL (ref 6.4–8.2)
RBC # BLD AUTO: 4.71 M/UL (ref 4.2–5.9)
SODIUM SERPL-SCNC: 140 MMOL/L (ref 136–145)
URATE SERPL-MCNC: 8.8 MG/DL (ref 3.5–7.2)
WBC # BLD AUTO: 6.6 K/UL (ref 4–11)

## 2023-11-21 NOTE — PROGRESS NOTES
[facility]   Cardiac Electrophysiology Consultation   Date: 12/4/2023  Reason for Consultation:  AF  Consult Requesting Physician: No att. providers found     Chief Complaint:   Chief Complaint   Patient presents with    Atrial Fibrillation     HPI: Julia Gagnon is a 81 y.o. male with PMH significant for CAD, s/p CABG (2003), HTN, HLD, HFpEF, AF SVR in 3/2022 during admission for CHF exacerbation, rate down to 32 bpm while sleeping and pauses <2.5 sec, asymptomatic, on Eliquis. Outpatient monitor showed 100% AF, 6 pauses up to 2.8 sec, s/p DCCV (5/2/22) with symptomatic improvement. Interval History: Today, he is here for yearly f/u, presenting in AF with PVCs. He reports feeling palpitations for the past month or so. He also becomes SOB with the palpitations. He seldomly becomes LHD as well. He has been found to have CPAP and is pending treatment with CPAP. Assessment:  1. Persistent AF SVR, on Eliquis  2. Bradycardia  3. CAD, s/p CABG, on ASA, follows Dr. Sandra Busch  4. HFpEF  5. HTN, stable on amlodipine, HCTZ, isosorbide  6. HLD, on statin  7. TR / MR, mild/moderate  8. Hematuria, resolved  9. PVCs, seen on ECG today    Plan:  1. Obtain 14 day CAM to assess AF and PVC burden and to correlate sxs  2. Obtain echo  3. No changes in medications today  4.   Follow up with me in 3-4 weeks    Past Medical History:   Diagnosis Date    Contact dermatitis and other eczema, due to unspecified cause     Coronary atherosclerosis of unspecified type of vessel, native or graft     Cardio- Dr. Ana Lopez    Diastolic CHF [facility])     Gout, unspecified     Hypertension     Hypertrophy of prostate without urinary obstruction and other lower urinary tract symptoms (LUTS)     Other and unspecified hyperlipidemia     Peripheral vascular complications         Past Surgical History:   Procedure Laterality Date    CARDIAC SURGERY      4 vessel cabg Dr. Lloyd Splinter y    COLONOSCOPY  2/16/07    Dr. Bella Loyd

## 2023-11-30 DIAGNOSIS — M1A.9XX0 CHRONIC GOUT WITHOUT TOPHUS, UNSPECIFIED CAUSE, UNSPECIFIED SITE: ICD-10-CM

## 2023-11-30 RX ORDER — ALLOPURINOL 300 MG/1
TABLET ORAL
Qty: 90 TABLET | Refills: 1 | Status: SHIPPED | OUTPATIENT
Start: 2023-11-30

## 2023-12-04 ENCOUNTER — OFFICE VISIT (OUTPATIENT)
Dept: CARDIOLOGY CLINIC | Age: 83
End: 2023-12-04
Payer: COMMERCIAL

## 2023-12-04 VITALS
BODY MASS INDEX: 35.45 KG/M2 | DIASTOLIC BLOOD PRESSURE: 78 MMHG | SYSTOLIC BLOOD PRESSURE: 120 MMHG | HEART RATE: 55 BPM | WEIGHT: 213 LBS

## 2023-12-04 DIAGNOSIS — I48.19 PERSISTENT ATRIAL FIBRILLATION (HCC): Primary | ICD-10-CM

## 2023-12-04 DIAGNOSIS — I34.0 MITRAL VALVE INSUFFICIENCY, UNSPECIFIED ETIOLOGY: ICD-10-CM

## 2023-12-04 DIAGNOSIS — I10 ESSENTIAL HYPERTENSION: ICD-10-CM

## 2023-12-04 DIAGNOSIS — I50.32 CHRONIC DIASTOLIC CONGESTIVE HEART FAILURE (HCC): ICD-10-CM

## 2023-12-04 DIAGNOSIS — R00.1 BRADYCARDIA: ICD-10-CM

## 2023-12-04 DIAGNOSIS — E78.5 HYPERLIPIDEMIA, UNSPECIFIED HYPERLIPIDEMIA TYPE: ICD-10-CM

## 2023-12-04 DIAGNOSIS — I25.10 CAD IN NATIVE ARTERY: ICD-10-CM

## 2023-12-04 PROCEDURE — 93246 EXT ECG>7D<15D RECORDING: CPT | Performed by: INTERNAL MEDICINE

## 2023-12-04 PROCEDURE — 3074F SYST BP LT 130 MM HG: CPT | Performed by: INTERNAL MEDICINE

## 2023-12-04 PROCEDURE — 1123F ACP DISCUSS/DSCN MKR DOCD: CPT | Performed by: INTERNAL MEDICINE

## 2023-12-04 PROCEDURE — 93000 ELECTROCARDIOGRAM COMPLETE: CPT | Performed by: INTERNAL MEDICINE

## 2023-12-04 PROCEDURE — 3078F DIAST BP <80 MM HG: CPT | Performed by: INTERNAL MEDICINE

## 2023-12-04 PROCEDURE — 99214 OFFICE O/P EST MOD 30 MIN: CPT | Performed by: INTERNAL MEDICINE

## 2023-12-05 ENCOUNTER — TELEPHONE (OUTPATIENT)
Dept: CARDIOLOGY CLINIC | Age: 83
End: 2023-12-05

## 2023-12-12 ENCOUNTER — HOSPITAL ENCOUNTER (EMERGENCY)
Age: 83
Discharge: HOME | End: 2023-12-12
Attending: EMERGENCY MEDICINE
Payer: COMMERCIAL

## 2023-12-12 ENCOUNTER — APPOINTMENT (OUTPATIENT)
Dept: GENERAL RADIOLOGY | Age: 83
End: 2023-12-12
Payer: COMMERCIAL

## 2023-12-12 VITALS
RESPIRATION RATE: 18 BRPM | TEMPERATURE: 99.3 F | OXYGEN SATURATION: 95 % | HEART RATE: 97 BPM | DIASTOLIC BLOOD PRESSURE: 81 MMHG | WEIGHT: 214 LBS | BODY MASS INDEX: 35.65 KG/M2 | HEIGHT: 65 IN | SYSTOLIC BLOOD PRESSURE: 158 MMHG

## 2023-12-12 DIAGNOSIS — J40 BRONCHITIS: Primary | ICD-10-CM

## 2023-12-12 LAB
ANION GAP SERPL CALCULATED.3IONS-SCNC: 11 MMOL/L (ref 3–16)
BASE EXCESS BLDV CALC-SCNC: 3.8 MMOL/L (ref -2–3)
BASOPHILS # BLD: 0 K/UL (ref 0–0.2)
BASOPHILS NFR BLD: 0.5 %
BUN SERPL-MCNC: 22 MG/DL (ref 7–20)
CALCIUM SERPL-MCNC: 9 MG/DL (ref 8.3–10.6)
CHLORIDE SERPL-SCNC: 100 MMOL/L (ref 99–110)
CO2 BLDV-SCNC: 29 MMOL/L
CO2 SERPL-SCNC: 25 MMOL/L (ref 21–32)
COHGB MFR BLDV: 0 % (ref 0–1.5)
CREAT SERPL-MCNC: 1.3 MG/DL (ref 0.8–1.3)
DEPRECATED RDW RBC AUTO: 16.3 % (ref 12.4–15.4)
DO-HGB MFR BLDV: 13.5 %
EKG ATRIAL RATE: 68 BPM
EKG DIAGNOSIS: NORMAL
EKG Q-T INTERVAL: 392 MS
EKG QRS DURATION: 90 MS
EKG QTC CALCULATION (BAZETT): 407 MS
EKG R AXIS: 54 DEGREES
EKG T AXIS: -79 DEGREES
EKG VENTRICULAR RATE: 65 BPM
EOSINOPHIL # BLD: 0.3 K/UL (ref 0–0.6)
EOSINOPHIL NFR BLD: 5.1 %
GFR SERPLBLD CREATININE-BSD FMLA CKD-EPI: 55 ML/MIN/{1.73_M2}
GLUCOSE SERPL-MCNC: 102 MG/DL (ref 70–99)
HCO3 BLDV-SCNC: 28.1 MMOL/L (ref 24–28)
HCT VFR BLD AUTO: 37 % (ref 40.5–52.5)
HGB BLD-MCNC: 12.1 G/DL (ref 13.5–17.5)
LYMPHOCYTES # BLD: 0.6 K/UL (ref 1–5.1)
LYMPHOCYTES NFR BLD: 9.6 %
MCH RBC QN AUTO: 26.5 PG (ref 26–34)
MCHC RBC AUTO-ENTMCNC: 32.7 G/DL (ref 31–36)
MCV RBC AUTO: 81.3 FL (ref 80–100)
METHGB MFR BLDV: 0.6 % (ref 0–1.5)
MONOCYTES # BLD: 1.1 K/UL (ref 0–1.3)
MONOCYTES NFR BLD: 19.7 %
NEUTROPHILS # BLD: 3.8 K/UL (ref 1.7–7.7)
NEUTROPHILS NFR BLD: 65.1 %
NT-PROBNP SERPL-MCNC: 828 PG/ML (ref 0–449)
PCO2 BLDV: 40.5 MMHG (ref 41–51)
PH BLDV: 7.45 [PH] (ref 7.35–7.45)
PLATELET # BLD AUTO: 221 K/UL (ref 135–450)
PMV BLD AUTO: 8.4 FL (ref 5–10.5)
PO2 BLDV: 54.6 MMHG (ref 25–40)
POTASSIUM SERPL-SCNC: 4.7 MMOL/L (ref 3.5–5.1)
RBC # BLD AUTO: 4.55 M/UL (ref 4.2–5.9)
SAO2 % BLDV: 86 %
SODIUM SERPL-SCNC: 136 MMOL/L (ref 136–145)
TROPONIN, HIGH SENSITIVITY: 31 NG/L (ref 0–22)
TROPONIN, HIGH SENSITIVITY: 32 NG/L (ref 0–22)
WBC # BLD AUTO: 5.8 K/UL (ref 4–11)

## 2023-12-12 PROCEDURE — 6370000000 HC RX 637 (ALT 250 FOR IP)

## 2023-12-12 PROCEDURE — 99285 EMERGENCY DEPT VISIT HI MDM: CPT

## 2023-12-12 PROCEDURE — 84484 ASSAY OF TROPONIN QUANT: CPT

## 2023-12-12 PROCEDURE — 94640 AIRWAY INHALATION TREATMENT: CPT

## 2023-12-12 PROCEDURE — 71046 X-RAY EXAM CHEST 2 VIEWS: CPT

## 2023-12-12 PROCEDURE — 83880 ASSAY OF NATRIURETIC PEPTIDE: CPT

## 2023-12-12 PROCEDURE — 85025 COMPLETE CBC W/AUTO DIFF WBC: CPT

## 2023-12-12 PROCEDURE — 80048 BASIC METABOLIC PNL TOTAL CA: CPT

## 2023-12-12 PROCEDURE — 93005 ELECTROCARDIOGRAM TRACING: CPT

## 2023-12-12 PROCEDURE — 6370000000 HC RX 637 (ALT 250 FOR IP): Performed by: EMERGENCY MEDICINE

## 2023-12-12 PROCEDURE — 82803 BLOOD GASES ANY COMBINATION: CPT

## 2023-12-12 RX ORDER — METHYLPREDNISOLONE 4 MG/1
TABLET ORAL
Qty: 1 KIT | Refills: 0 | Status: SHIPPED | OUTPATIENT
Start: 2023-12-12 | End: 2023-12-18

## 2023-12-12 RX ORDER — AZITHROMYCIN 250 MG/1
250 TABLET, FILM COATED ORAL ONCE
Status: COMPLETED | OUTPATIENT
Start: 2023-12-12 | End: 2023-12-12

## 2023-12-12 RX ORDER — AZITHROMYCIN 250 MG/1
250 TABLET, FILM COATED ORAL SEE ADMIN INSTRUCTIONS
Qty: 6 TABLET | Refills: 0 | Status: SHIPPED | OUTPATIENT
Start: 2023-12-12 | End: 2023-12-17

## 2023-12-12 RX ORDER — IPRATROPIUM BROMIDE AND ALBUTEROL SULFATE 2.5; .5 MG/3ML; MG/3ML
1 SOLUTION RESPIRATORY (INHALATION)
Status: DISCONTINUED | OUTPATIENT
Start: 2023-12-12 | End: 2023-12-12 | Stop reason: HOSPADM

## 2023-12-12 RX ORDER — PREDNISONE 20 MG/1
60 TABLET ORAL ONCE
Status: COMPLETED | OUTPATIENT
Start: 2023-12-12 | End: 2023-12-12

## 2023-12-12 RX ORDER — ALBUTEROL SULFATE 2.5 MG/3ML
2.5 SOLUTION RESPIRATORY (INHALATION) EVERY 4 HOURS PRN
Qty: 1 EACH | Refills: 0 | Status: SHIPPED | OUTPATIENT
Start: 2023-12-12

## 2023-12-12 RX ORDER — IPRATROPIUM BROMIDE AND ALBUTEROL SULFATE 2.5; .5 MG/3ML; MG/3ML
1 SOLUTION RESPIRATORY (INHALATION) ONCE
Status: COMPLETED | OUTPATIENT
Start: 2023-12-12 | End: 2023-12-12

## 2023-12-12 RX ADMIN — IPRATROPIUM BROMIDE AND ALBUTEROL SULFATE 1 DOSE: 2.5; .5 SOLUTION RESPIRATORY (INHALATION) at 11:10

## 2023-12-12 RX ADMIN — IPRATROPIUM BROMIDE AND ALBUTEROL SULFATE 1 DOSE: .5; 3 SOLUTION RESPIRATORY (INHALATION) at 13:32

## 2023-12-12 RX ADMIN — PREDNISONE 60 MG: 20 TABLET ORAL at 13:16

## 2023-12-12 RX ADMIN — AZITHROMYCIN DIHYDRATE 250 MG: 250 TABLET, FILM COATED ORAL at 13:17

## 2023-12-12 ASSESSMENT — PAIN - FUNCTIONAL ASSESSMENT: PAIN_FUNCTIONAL_ASSESSMENT: NONE - DENIES PAIN

## 2023-12-12 NOTE — DISCHARGE INSTRUCTIONS
Thank you for allowing us take part in your care. You should follow-up with your regular doctor. [phone] please call to schedule appointment. You are being discharged with the following medications:  Albuterol inhaler to take every 4 hours as needed to help with shortness of breath  Oral steroids, please follow instructions on package. Antibiotics, azithromycin. Please follow instructions on package    You should return to the emergency department if your symptoms worsen or do not resolve. In addition, return if:  - You have a fever (greater than 101 degrees)  - You have chest pain, shortness of breath, abdominal pain, or uncontrollable vomiting  - You are unable to eat or drink  - You pass out  - You have difficulty moving your arms or legs   - You have difficulty speaking or slurred speech  - Or you have any concern that you feel needs acute physician evaluation.

## 2023-12-13 DIAGNOSIS — J18.9 PNEUMONIA DUE TO INFECTIOUS ORGANISM, UNSPECIFIED LATERALITY, UNSPECIFIED PART OF LUNG: ICD-10-CM

## 2023-12-13 DIAGNOSIS — R06.02 SOB (SHORTNESS OF BREATH): Primary | ICD-10-CM

## 2023-12-13 RX ORDER — NEBULIZER ACCESSORIES
1 KIT MISCELLANEOUS DAILY PRN
Qty: 1 KIT | Refills: 0 | Status: SHIPPED | OUTPATIENT
Start: 2023-12-13

## 2023-12-13 NOTE — TELEPHONE ENCOUNTER
Patient was at the ER yesterday. They gave him a prescription for     albuterol (PROVENTIL) (2.5 MG/3ML) 0.083% nebulizer solution     They picked up the medicine but it did not come with the device to dispense the medication. Pharmacy said we have to send a script for the device. [facility] [address], [address] [place] [address] [phone] - f [phone]     Please call and advise when the script has been sent so they can pick it up asap.

## 2023-12-14 RX ORDER — ALBUTEROL SULFATE 90 UG/1
2 AEROSOL, METERED RESPIRATORY (INHALATION) 4 TIMES DAILY PRN
Qty: 18 G | Refills: 0 | Status: SHIPPED | OUTPATIENT
Start: 2023-12-14

## 2023-12-20 ENCOUNTER — TELEPHONE (OUTPATIENT)
Dept: CARDIOLOGY CLINIC | Age: 83
End: 2023-12-20

## 2023-12-21 ENCOUNTER — PREP FOR PROCEDURE (OUTPATIENT)
Dept: CARDIOLOGY CLINIC | Age: 83
End: 2023-12-21

## 2023-12-21 PROCEDURE — 93248 EXT ECG>7D<15D REV&INTERPJ: CPT | Performed by: INTERNAL MEDICINE

## 2023-12-21 RX ORDER — SODIUM CHLORIDE 0.9 % (FLUSH) 0.9 %
5-40 SYRINGE (ML) INJECTION EVERY 12 HOURS SCHEDULED
Status: CANCELLED | OUTPATIENT
Start: 2024-01-29

## 2023-12-21 RX ORDER — SODIUM CHLORIDE 9 MG/ML
INJECTION, SOLUTION INTRAVENOUS PRN
Status: CANCELLED | OUTPATIENT
Start: 2024-01-29

## 2023-12-21 RX ORDER — SODIUM CHLORIDE 0.9 % (FLUSH) 0.9 %
5-40 SYRINGE (ML) INJECTION PRN
Status: CANCELLED | OUTPATIENT
Start: 2024-01-29

## 2023-12-26 DIAGNOSIS — I48.19 PERSISTENT ATRIAL FIBRILLATION (HCC): ICD-10-CM

## 2023-12-26 DIAGNOSIS — R00.2 PALPITATION: Primary | ICD-10-CM

## 2024-01-02 ENCOUNTER — TELEPHONE (OUTPATIENT)
Dept: INTERNAL MEDICINE CLINIC | Age: 84
End: 2024-01-02

## 2024-01-02 NOTE — TELEPHONE ENCOUNTER
losing  weight  (Newest Message First)  View All Conversations on this Encounter  Harjeet BOURGEOIS [facility] (supporting Cornelius Pascual MD)4 days ago     OS  Dear Dr. Pascual,  I seem to be losing a lot of weight - about 15 pounds in 3 weeks.  On January 29, I should get a pacemaker.  On January 22 I have an appointment with you.  Should I see you earlier?  Please call me or send me a message.  [phone]

## 2024-01-07 DIAGNOSIS — I50.32 CHRONIC DIASTOLIC CONGESTIVE HEART FAILURE (HCC): ICD-10-CM

## 2024-01-08 RX ORDER — ISOSORBIDE MONONITRATE 60 MG/1
60 TABLET, EXTENDED RELEASE ORAL 2 TIMES DAILY
Qty: 180 TABLET | Refills: 1 | Status: SHIPPED | OUTPATIENT
Start: 2024-01-08

## 2024-01-22 ENCOUNTER — OFFICE VISIT (OUTPATIENT)
Dept: INTERNAL MEDICINE CLINIC | Age: 84
End: 2024-01-22
Payer: COMMERCIAL

## 2024-01-22 VITALS
HEIGHT: 65 IN | DIASTOLIC BLOOD PRESSURE: 60 MMHG | WEIGHT: 200 LBS | SYSTOLIC BLOOD PRESSURE: 135 MMHG | BODY MASS INDEX: 33.32 KG/M2 | HEART RATE: 55 BPM

## 2024-01-22 DIAGNOSIS — R00.1 SINUS BRADYCARDIA: Primary | ICD-10-CM

## 2024-01-22 DIAGNOSIS — I48.19 PERSISTENT ATRIAL FIBRILLATION (HCC): ICD-10-CM

## 2024-01-22 DIAGNOSIS — I50.32 CHRONIC DIASTOLIC CONGESTIVE HEART FAILURE (HCC): ICD-10-CM

## 2024-01-22 DIAGNOSIS — D68.69 SECONDARY HYPERCOAGULABLE STATE (HCC): ICD-10-CM

## 2024-01-22 DIAGNOSIS — I25.118 ATHEROSCLEROSIS OF NATIVE CORONARY ARTERY OF NATIVE HEART WITH OTHER FORM OF ANGINA PECTORIS (HCC): ICD-10-CM

## 2024-01-22 DIAGNOSIS — N18.31 STAGE 3A CHRONIC KIDNEY DISEASE (HCC): ICD-10-CM

## 2024-01-22 DIAGNOSIS — F51.01 PRIMARY INSOMNIA: ICD-10-CM

## 2024-01-22 PROCEDURE — 1123F ACP DISCUSS/DSCN MKR DOCD: CPT | Performed by: HOSPITALIST

## 2024-01-22 PROCEDURE — 99214 OFFICE O/P EST MOD 30 MIN: CPT | Performed by: HOSPITALIST

## 2024-01-22 PROCEDURE — 3075F SYST BP GE 130 - 139MM HG: CPT | Performed by: HOSPITALIST

## 2024-01-22 PROCEDURE — 3078F DIAST BP <80 MM HG: CPT | Performed by: HOSPITALIST

## 2024-01-22 RX ORDER — ZOLPIDEM TARTRATE 10 MG/1
10 TABLET ORAL NIGHTLY PRN
Qty: 90 TABLET | Refills: 0 | Status: SHIPPED | OUTPATIENT
Start: 2024-01-22 | End: 2024-04-21

## 2024-01-22 ASSESSMENT — PATIENT HEALTH QUESTIONNAIRE - PHQ9
1. LITTLE INTEREST OR PLEASURE IN DOING THINGS: 0
SUM OF ALL RESPONSES TO PHQ QUESTIONS 1-9: 0
2. FEELING DOWN, DEPRESSED OR HOPELESS: 0
SUM OF ALL RESPONSES TO PHQ QUESTIONS 1-9: 0
SUM OF ALL RESPONSES TO PHQ QUESTIONS 1-9: 0
SUM OF ALL RESPONSES TO PHQ9 QUESTIONS 1 & 2: 0
SUM OF ALL RESPONSES TO PHQ QUESTIONS 1-9: 0

## 2024-01-22 NOTE — H&P (VIEW-ONLY)
insomnia  -     zolpidem (AMBIEN) 10 MG tablet; Take 1 tablet by mouth nightly as needed for Sleep for up to 90 days. Max Daily Amount: 10 mg    Chronic diastolic congestive heart failure (HCC)        -     Compensated; continue current medication    Persistent atrial fibrillation (HCC)        -     Rate is well-controlled.  Will undergo pacemaker placement.  Continue current medications; keep scheduled follow-up cardiology/electrophysiology appointment    Secondary hypercoagulable state (HCC)        -     Secondary to atrial fibrillation; continue apixaban 5 mg orally twice a day    Atherosclerosis of native coronary artery of native heart with other form of angina pectoris (HCC)        -     Stable; no recent episodes of chest pain        -     Continue aspirin 81 mg daily and atorvastatin 40 mg daily        -     Keep scheduled follow-up cardiology appointment with Dr. Nitesh Ni    Stage 3a chronic kidney disease (HCC)         -     Continue to avoid use of NSAIDs and stay well-hydrated      Return in about 3 months (around 4/22/2024) for insomnia, CHF, a fib, CKD.    Cornelius Pascual MD

## 2024-01-22 NOTE — PROGRESS NOTES
Follow Up Visit Established Patient Visit    Patient:  Harjeet Ivy                                               : 1940  Age: 83 y.o.  MRN: [id number]  Date : 2024    Harjeet Ivy is a 83 y.o. male who presents for : Follow-up appointment    Chief Complaint   Patient presents with    Follow-up     Will have a pacemaker placement on 2024.  + marked bradycardia.  + some mouth dryness  + insomnia; Ambien 10 mg orally nightly as needed provides good symptom relief.  Does not report any side effects of Ambien.  No recent bouts of gout.  Toenail and fingernail onychomycosis improving with the use of ciclopirox.    Past Medical History:   Diagnosis Date    Contact dermatitis and other eczema, due to unspecified cause     Coronary atherosclerosis of unspecified type of vessel, native or graft     Cardio- Dr. Tian Petty    Diastolic CHF (HCC)     Gout, unspecified     Hypertension     Hypertrophy of prostate without urinary obstruction and other lower urinary tract symptoms (LUTS)     Other and unspecified hyperlipidemia     Peripheral vascular complications        Past Surgical History:   Procedure Laterality Date    CARDIAC SURGERY      4 vessel cabg Dr. Anuj chester    COLONOSCOPY  07    Dr. Lino Trinh polyps-repeat 2012    COLONOSCOPY  5.23.13    Gayathri ARREGUIN polyp repeat 2018    COLONOSCOPY  2018    Dr. Lino Trinh polyp removed no repeat necessary    CORONARY ARTERY BYPASS GRAFT      Dr. Worthy       Current Outpatient Medications   Medication Sig Dispense Refill    zolpidem (AMBIEN) 10 MG tablet Take 1 tablet by mouth nightly as needed for Sleep for up to 90 days. Max Daily Amount: 10 mg 90 tablet 0    isosorbide mononitrate (IMDUR) 60 MG extended release tablet Take 1 tablet by mouth in the morning and at bedtime 180 tablet 1    albuterol sulfate HFA (VENTOLIN HFA) 108 (90 Base) MCG/ACT inhaler Inhale 2 puffs into the lungs 4 times daily as needed for Wheezing 18 g 5    Respiratory Therapy

## 2024-01-25 DIAGNOSIS — I44.30 AV BLOCK: ICD-10-CM

## 2024-01-25 DIAGNOSIS — I48.19 PERSISTENT ATRIAL FIBRILLATION (HCC): ICD-10-CM

## 2024-01-25 DIAGNOSIS — I10 ESSENTIAL HYPERTENSION: ICD-10-CM

## 2024-01-25 LAB
ANION GAP SERPL CALCULATED.3IONS-SCNC: 10 MMOL/L (ref 3–16)
BUN SERPL-MCNC: 17 MG/DL (ref 7–20)
CALCIUM SERPL-MCNC: 9.4 MG/DL (ref 8.3–10.6)
CHLORIDE SERPL-SCNC: 105 MMOL/L (ref 99–110)
CO2 SERPL-SCNC: 28 MMOL/L (ref 21–32)
CREAT SERPL-MCNC: 1.3 MG/DL (ref 0.8–1.3)
DEPRECATED RDW RBC AUTO: 17.9 % (ref 12.4–15.4)
GFR SERPLBLD CREATININE-BSD FMLA CKD-EPI: 55 ML/MIN/{1.73_M2}
GLUCOSE SERPL-MCNC: 97 MG/DL (ref 70–99)
HCT VFR BLD AUTO: 40.1 % (ref 40.5–52.5)
HGB BLD-MCNC: 13 G/DL (ref 13.5–17.5)
INR PPP: 1.15 (ref 0.84–1.16)
MCH RBC QN AUTO: 25.9 PG (ref 26–34)
MCHC RBC AUTO-ENTMCNC: 32.5 G/DL (ref 31–36)
MCV RBC AUTO: 79.7 FL (ref 80–100)
PLATELET # BLD AUTO: 206 K/UL (ref 135–450)
PMV BLD AUTO: 8.5 FL (ref 5–10.5)
POTASSIUM SERPL-SCNC: 4.6 MMOL/L (ref 3.5–5.1)
PROTHROMBIN TIME: 14.7 SEC (ref 11.5–14.8)
RBC # BLD AUTO: 5.03 M/UL (ref 4.2–5.9)
SODIUM SERPL-SCNC: 143 MMOL/L (ref 136–145)
WBC # BLD AUTO: 6.6 K/UL (ref 4–11)

## 2024-01-25 RX ORDER — AMLODIPINE BESYLATE 5 MG/1
5 TABLET ORAL DAILY
Qty: 90 TABLET | Refills: 1 | Status: SHIPPED | OUTPATIENT
Start: 2024-01-25

## 2024-01-26 NOTE — PRE-PROCEDURE INSTRUCTIONS
Attempted to call patient about procedure. No answer. Voicemail left. Told to be here at 0630 for procedure at 0800. NPO after midnight, but can take morning medication with sips of water, office should have told them when and if they should stop any blood thinners. To have a responsible adult be with patient take them home and stay with them afterwards, if they are not admitted to hospital afterwards. And if available bring current list of medications.

## 2024-01-29 ENCOUNTER — ANESTHESIA EVENT (OUTPATIENT)
Dept: CARDIAC CATH/INVASIVE PROCEDURES | Age: 84
End: 2024-01-29
Payer: COMMERCIAL

## 2024-01-29 ENCOUNTER — HOSPITAL ENCOUNTER (OUTPATIENT)
Dept: CARDIAC CATH/INVASIVE PROCEDURES | Age: 84
Discharge: HOME | End: 2024-01-31
Payer: COMMERCIAL

## 2024-01-29 ENCOUNTER — HOSPITAL ENCOUNTER (OUTPATIENT)
Dept: GENERAL RADIOLOGY | Age: 84
Discharge: HOME | End: 2024-01-29
Payer: COMMERCIAL

## 2024-01-29 ENCOUNTER — ANESTHESIA (OUTPATIENT)
Dept: CARDIAC CATH/INVASIVE PROCEDURES | Age: 84
End: 2024-01-29
Payer: COMMERCIAL

## 2024-01-29 VITALS
WEIGHT: 204 LBS | OXYGEN SATURATION: 95 % | RESPIRATION RATE: 23 BRPM | DIASTOLIC BLOOD PRESSURE: 65 MMHG | BODY MASS INDEX: 33.99 KG/M2 | TEMPERATURE: 97.9 F | SYSTOLIC BLOOD PRESSURE: 123 MMHG | HEART RATE: 74 BPM | HEIGHT: 65 IN

## 2024-01-29 DIAGNOSIS — Z95.0 CARDIAC PACEMAKER IN SITU: Primary | ICD-10-CM

## 2024-01-29 LAB
EKG ATRIAL RATE: 98 BPM
EKG DIAGNOSIS: NORMAL
EKG Q-T INTERVAL: 420 MS
EKG QRS DURATION: 96 MS
EKG QTC CALCULATION (BAZETT): 429 MS
EKG R AXIS: 35 DEGREES
EKG T AXIS: -7 DEGREES
EKG VENTRICULAR RATE: 63 BPM

## 2024-01-29 PROCEDURE — 93005 ELECTROCARDIOGRAM TRACING: CPT | Performed by: INTERNAL MEDICINE

## 2024-01-29 PROCEDURE — 2580000003 HC RX 258: Performed by: INTERNAL MEDICINE

## 2024-01-29 PROCEDURE — C1785 PMKR, DUAL, RATE-RESP: HCPCS | Performed by: INTERNAL MEDICINE

## 2024-01-29 PROCEDURE — 6360000002 HC RX W HCPCS: Performed by: NURSE ANESTHETIST, CERTIFIED REGISTERED

## 2024-01-29 PROCEDURE — C1769 GUIDE WIRE: HCPCS | Performed by: INTERNAL MEDICINE

## 2024-01-29 PROCEDURE — 3700000001 HC ADD 15 MINUTES (ANESTHESIA): Performed by: ANESTHESIOLOGY

## 2024-01-29 PROCEDURE — 93010 ELECTROCARDIOGRAM REPORT: CPT | Performed by: INTERNAL MEDICINE

## 2024-01-29 PROCEDURE — C1769 GUIDE WIRE: HCPCS

## 2024-01-29 PROCEDURE — 6360000002 HC RX W HCPCS: Performed by: INTERNAL MEDICINE

## 2024-01-29 PROCEDURE — 71045 X-RAY EXAM CHEST 1 VIEW: CPT

## 2024-01-29 PROCEDURE — C1898 LEAD, PMKR, OTHER THAN TRANS: HCPCS | Performed by: INTERNAL MEDICINE

## 2024-01-29 PROCEDURE — C1887 CATHETER, GUIDING: HCPCS | Performed by: INTERNAL MEDICINE

## 2024-01-29 PROCEDURE — 2580000003 HC RX 258: Performed by: NURSE ANESTHETIST, CERTIFIED REGISTERED

## 2024-01-29 PROCEDURE — C1894 INTRO/SHEATH, NON-LASER: HCPCS | Performed by: INTERNAL MEDICINE

## 2024-01-29 PROCEDURE — 2500000003 HC RX 250 WO HCPCS

## 2024-01-29 PROCEDURE — 33208 INSRT HEART PM ATRIAL & VENT: CPT

## 2024-01-29 PROCEDURE — 6360000002 HC RX W HCPCS

## 2024-01-29 PROCEDURE — C1894 INTRO/SHEATH, NON-LASER: HCPCS

## 2024-01-29 PROCEDURE — 3700000000 HC ANESTHESIA ATTENDED CARE: Performed by: ANESTHESIOLOGY

## 2024-01-29 PROCEDURE — C1898 LEAD, PMKR, OTHER THAN TRANS: HCPCS

## 2024-01-29 PROCEDURE — 33208 INSRT HEART PM ATRIAL & VENT: CPT | Performed by: INTERNAL MEDICINE

## 2024-01-29 RX ORDER — METOPROLOL SUCCINATE 25 MG/1
25 TABLET, EXTENDED RELEASE ORAL DAILY
Qty: 30 TABLET | Refills: 3 | Status: SHIPPED | OUTPATIENT
Start: 2024-01-29

## 2024-01-29 RX ORDER — PROPOFOL 10 MG/ML
INJECTION, EMULSION INTRAVENOUS PRN
Status: DISCONTINUED | OUTPATIENT
Start: 2024-01-29 | End: 2024-01-29 | Stop reason: SDUPTHER

## 2024-01-29 RX ORDER — MIDAZOLAM HYDROCHLORIDE 1 MG/ML
INJECTION INTRAMUSCULAR; INTRAVENOUS PRN
Status: DISCONTINUED | OUTPATIENT
Start: 2024-01-29 | End: 2024-01-29 | Stop reason: SDUPTHER

## 2024-01-29 RX ORDER — LIDOCAINE HYDROCHLORIDE 20 MG/ML
INJECTION, SOLUTION INTRAVENOUS PRN
Status: DISCONTINUED | OUTPATIENT
Start: 2024-01-29 | End: 2024-01-29 | Stop reason: SDUPTHER

## 2024-01-29 RX ORDER — SODIUM CHLORIDE 0.9 % (FLUSH) 0.9 %
5-40 SYRINGE (ML) INJECTION EVERY 12 HOURS SCHEDULED
Status: DISCONTINUED | OUTPATIENT
Start: 2024-01-29 | End: 2024-02-01 | Stop reason: HOSPADM

## 2024-01-29 RX ORDER — SODIUM CHLORIDE, SODIUM LACTATE, POTASSIUM CHLORIDE, CALCIUM CHLORIDE 600; 310; 30; 20 MG/100ML; MG/100ML; MG/100ML; MG/100ML
INJECTION, SOLUTION INTRAVENOUS CONTINUOUS PRN
Status: DISCONTINUED | OUTPATIENT
Start: 2024-01-29 | End: 2024-01-29 | Stop reason: SDUPTHER

## 2024-01-29 RX ORDER — SODIUM CHLORIDE 9 MG/ML
INJECTION, SOLUTION INTRAVENOUS PRN
Status: DISCONTINUED | OUTPATIENT
Start: 2024-01-29 | End: 2024-02-01 | Stop reason: HOSPADM

## 2024-01-29 RX ORDER — SODIUM CHLORIDE 0.9 % (FLUSH) 0.9 %
5-40 SYRINGE (ML) INJECTION PRN
Status: DISCONTINUED | OUTPATIENT
Start: 2024-01-29 | End: 2024-02-01 | Stop reason: HOSPADM

## 2024-01-29 RX ORDER — METOPROLOL SUCCINATE 25 MG/1
25 TABLET, EXTENDED RELEASE ORAL DAILY
Qty: 90 TABLET | OUTPATIENT
Start: 2024-01-29

## 2024-01-29 RX ADMIN — PROPOFOL 50 MG: 10 INJECTION, EMULSION INTRAVENOUS at 07:52

## 2024-01-29 RX ADMIN — PROPOFOL 50 MG: 10 INJECTION, EMULSION INTRAVENOUS at 08:31

## 2024-01-29 RX ADMIN — SODIUM CHLORIDE, SODIUM LACTATE, POTASSIUM CHLORIDE, AND CALCIUM CHLORIDE: .6; .31; .03; .02 INJECTION, SOLUTION INTRAVENOUS at 07:47

## 2024-01-29 RX ADMIN — MIDAZOLAM HYDROCHLORIDE 2 MG: 2 INJECTION, SOLUTION INTRAMUSCULAR; INTRAVENOUS at 07:52

## 2024-01-29 RX ADMIN — CEFAZOLIN 2000 MG: 2 INJECTION, POWDER, FOR SOLUTION INTRAMUSCULAR; INTRAVENOUS at 07:49

## 2024-01-29 RX ADMIN — LIDOCAINE HYDROCHLORIDE 100 MG: 20 INJECTION, SOLUTION INTRAVENOUS at 07:52

## 2024-01-29 RX ADMIN — PROPOFOL 80 MCG/KG/MIN: 10 INJECTION, EMULSION INTRAVENOUS at 07:53

## 2024-01-29 NOTE — DISCHARGE INSTRUCTIONS
[facility] office at:  [phone]    MD Graeme Dias, FABIANA Arnold, FABIANA Vásquez RN    Permanent Pacemaker (PPM)   Implantable Cardioverter Defibrillator (ICD)  Care Instructions      Your permanent pacemaker/ICD is a sophisticated piece of electronic equipment and both the wound and the device need special care during your recovery period and into the future.  Please use these instructions as guide.  If you have any questions please call the office.     Wound Care:   Keep the incision site clean and dry for one week.  Do not get the incision or bandage wet.   You may sponge bathe but DO NOT shower for the first seven days or until after your first follow up office visit.   Do not remove the outer dressing   DO NOT apply soaps, ointments,  lotion or powder to the incision site.   Wear comfortable clothes that will not rub on the incision site. Avoid bra straps or suspenders.   At your one week follow up appointment your bandage will be removed and you will be given further instruction on care of the site at that time.  Your bra strap may lay directly over the incision. If it does - please do not wear the bra strap on the incisional side for 4 weeks to prevent irritation.      When to contact the office:  Changes in how your incision site is healing including:  Increase in swelling and/or tenderness   Increase in redness at the incision site or surrounding the device  Drainage from the incision  If you experience:  Lightheadedness, dizziness or passing out  Increase in fatigue or shortness of breath  Fever (temperature greater than 100 degrees F)  Chills   Prolonged hiccups or chest discomfort  If you have any questions     Activity:   Do not raise your elbow above shoulder level or reach behind your back for 4 weeks after your procedure.  DO NOT lift more than 8 pounds with your affected arm for 4 weeks after your procedure. (A gallon of milk is 8

## 2024-01-29 NOTE — PROGRESS NOTES
Cath Lab Pre Procedure Flowsheet    Plan of Care:     Hemodynamics and cardiac rhythm will remain stable.   Comfort level will be maintained.   Respiratory function will remain adequate.   Pt/family will verbalize understanding of the procedure.   Procedure will be tolerated without complications.   Patient will recover from procedure without complications.   ID armband on patient and identification verified.   Informed consent obtained.   Non invasive blood pressure cuff applied, monitoring initiated.   EKG pads and pulse oximeter applied, monitoring initiated.   Instructions given. Patient and / or family verbalize understanding.   H&P will be documented by physician in [facility].     Pre-procedure:    NPO Status: Pt has been NPO since midnight. .    Contrast / IV Dye Allergy:     Pregnancy Test: N/A.    Prep Sites: Wrist(s)  Chest    Carlton's Test:    Pulses:     Anticoagulants: Eliquis. Last Dose: 1/29.  Any missed doses:  No..     Antiplatelets: None.     Chief Complaint:      Diabetic: No    Pre EKG Rhythm: a fib    Pre SBP:140/70    IV access: left hand    Pre-procedure blood work collected by:     NIH Scale:

## 2024-01-29 NOTE — PROCEDURES
complications. Patient was transported to the holding area in stable condition.     Device and Leads information:            Device was programmed to MVP with lower rate of 60 and upper tracking rate of 130.     Impression:    Pre- and post-procedural diagnoses of tachybradycardia syndrome with symptoms of presyncope with successful implantation of a Medtronic 2-chamber PPM.     Plan:   Started Toprol 25 mg p.o. daily    The patient will be observed and have usual post-implant care, including chest x-ray, and interrogation of the device.     From an EP perspective, if the interrogation and CXR are showing appropriate functioning, parameters and placement, the patient may be discharged from the hospital.    Follow-up will be a 1-week wound check with our nurse in the [facility].     Thank you for allowing us to participate in the care of your patient. If you have any questions, please do not hesitate to contact me.    Anastasia Valentin MD   Cardiac Electrophysiology  [facility]

## 2024-01-29 NOTE — INTERVAL H&P NOTE
Update History & Physical    The patient's History and Physical of January 22, was reviewed with the patient and I examined the patient. There was no change. The surgical site was confirmed by the patient and me.     Plan: The risks, benefits, expected outcome, and alternative to the recommended procedure have been discussed with the patient. Patient understands and wants to proceed with the procedure.     Electronically signed by Victorino Valentin MD on 1/29/2024 at 8:54 AM

## 2024-01-29 NOTE — ANESTHESIA PRE PROCEDURE
Department of Anesthesiology  Preprocedure Note       Name:  Harjeet Ivy   Age:  83 y.o.  :  1940                                          MRN:  [id number]         Date:  2024      Surgeon: * Surgery not found *    Procedure:     Medications prior to admission:   Prior to Admission medications    Medication Sig Start Date End Date Taking? Authorizing Provider   amLODIPine (NORVASC) 5 MG tablet TAKE 1 TABLET BY MOUTH DAILY 24   Cornelius Pascual MD   zolpidem (AMBIEN) 10 MG tablet Take 1 tablet by mouth nightly as needed for Sleep for up to 90 days. Max Daily Amount: 10 mg 24  Cornelius Pascual MD   isosorbide mononitrate (IMDUR) 60 MG extended release tablet Take 1 tablet by mouth in the morning and at bedtime 24   Cornelius Pascual MD   albuterol sulfate HFA (VENTOLIN HFA) 108 (90 Base) MCG/ACT inhaler Inhale 2 puffs into the lungs 4 times daily as needed for Wheezing 23   Cornelius Pascual MD   Respiratory Therapy Supplies (NEBULIZER/TUBING/MOUTHPIECE) KIT 1 kit by Does not apply route daily as needed (sob) 23   Cornelius Pascual MD   albuterol (PROVENTIL) (2.5 MG/3ML) 0.083% nebulizer solution Take 3 mLs by nebulization every 4 hours as needed for Wheezing or Shortness of Breath 23   Uriel Thomason MD   allopurinol (ZYLOPRIM) 300 MG tablet TAKE ONE TABLET BY MOUTH DAILY 23   Cornelius Pascual MD   furosemide (LASIX) 80 MG tablet Take 1 tablet by mouth daily 10/25/23   Nitesh Ni MD   apixaban (ELIQUIS) 5 MG TABS tablet Take 1 tablet by mouth 2 times daily 10/12/23   Cornelius Pascual MD   hydroCHLOROthiazide (HYDRODIURIL) 25 MG tablet Take 1 tablet by mouth daily 23   Cornelius Pascual MD   colchicine (COLCRYS) 0.6 MG tablet Take 1 tablet by mouth daily 23   Cornelius Pascual MD   ciclopirox (PENLAC) 8 % solution Apply topically nightly. 3/29/23   Cornelius Pascual MD   atorvastatin (LIPITOR) 40 MG tablet Take 1 tablet by mouth daily 22   Conner Alvarez MD

## 2024-01-29 NOTE — ANESTHESIA POSTPROCEDURE EVALUATION
Department of Anesthesiology  Postprocedure Note    Patient: Harjeet Ivy  MRN: [id number]  YOB: 1940  Date of evaluation: 1/29/2024    Procedure Summary       Date: 01/29/24 Room / Location: [facility] Cath Lab    Anesthesia Start: 0747 Anesthesia Stop: 0859    Procedure: TJHZ PACEMAKER W ANES Diagnosis:     Scheduled Providers: Luis Lloyd MD Responsible Provider: Lusi Lloyd MD    Anesthesia Type: general ASA Status: 3            Anesthesia Type: No value filed.    Fausto Phase I:      Fausto Phase II: Fausto Score: 10    Anesthesia Post Evaluation    Patient location during evaluation: PACU  Patient participation: complete - patient participated  Level of consciousness: awake and alert  Pain score: 0  Airway patency: patent  Nausea & Vomiting: no nausea and no vomiting  Cardiovascular status: hemodynamically stable  Respiratory status: acceptable  Hydration status: euvolemic  Pain management: adequate    No notable events documented.

## 2024-01-29 NOTE — FLOWSHEET NOTE
1045- discharge      Discharge instructions reviewed with patient and iv d/c patient discharged via wheel chair out the main entrance

## 2024-01-30 PROBLEM — Z95.0 CARDIAC PACEMAKER IN SITU: Status: ACTIVE | Noted: 2024-01-30

## 2024-01-31 LAB
EKG ATRIAL RATE: 90 BPM
EKG DIAGNOSIS: NORMAL
EKG Q-T INTERVAL: 386 MS
EKG QRS DURATION: 90 MS
EKG QTC CALCULATION (BAZETT): 442 MS
EKG R AXIS: 35 DEGREES
EKG T AXIS: -31 DEGREES
EKG VENTRICULAR RATE: 79 BPM

## 2024-02-06 ENCOUNTER — NURSE ONLY (OUTPATIENT)
Dept: CARDIOLOGY CLINIC | Age: 84
End: 2024-02-06

## 2024-02-06 DIAGNOSIS — R00.1 BRADYCARDIA: ICD-10-CM

## 2024-02-06 DIAGNOSIS — Z95.0 CARDIAC PACEMAKER IN SITU: Primary | ICD-10-CM

## 2024-02-06 DIAGNOSIS — I49.5 SSS (SICK SINUS SYNDROME) (HCC): ICD-10-CM

## 2024-02-06 NOTE — PATIENT INSTRUCTIONS
[facility] office at:  [phone]  Victorino Vásquez RN      Permanent Pacemaker (PPM)   Implantable Cardioverter Defibrillator (ICD)  Care Instructions      Your permanent pacemaker/ICD is a sophisticated piece of electronic equipment and both the wound and the device need special care during your recovery period and into the future.  Please use these instructions as guide.  If you have any questions please call the office.     Wound Care:   May get incision wet.  SHOWERS ONLY and pat dry.  Do NOT rub, scrub, apply any ointments or recover the incision area.  LEAVE OPEN TO AIR.  Do not remove the steri strips (the pieces of \"tape\" that cover the incision). These will eventually fall off on their own.    Wear comfortable clothes that will not rub on the incision site. Avoid bra straps or suspenders.        When to contact the office:  Changes in how your incision site is healing including:  Increase in swelling and/or tenderness   Increase in redness at the incision site or surrounding the device  Drainage from the incision  If you experience:  Lightheadedness, dizziness or passing out  Increase in fatigue or shortness of breath  Fever (temperature greater than 100 degrees F)  Chills   Prolonged hiccups or chest discomfort  If you have any questions     Activity:   Do not raise your elbow above shoulder level or reach behind your back for 4 weeks after your procedure.  DO NOT lift more than 8 pounds with your affected arm for 4 weeks after your procedure. (A gallon of milk is 8 pounds).  Avoid excessive pushing, pulling or twisting.  Wear a sling only as a reminder to limit the activity of your affected arm.  It is important to remember to still use your affected arm to maintain mobility but no excessive movements.  Avoid sleeping on your right side.  No sports activities until approved by your

## 2024-02-06 NOTE — PROGRESS NOTES
Patient comes in for a 1 week wound and programming evaluation s/p MDT DC PPM implanted by Dr. Campbell on 01.29.2024 (CSP).  Cuban interpretation utilized through [employer] language services, session code 08556,  Nayana.  Pt accompanied by son.     Ayad Alejandro appears active.  Discussed remote monitoring, frequencies and communications.  Alejandro literature, [employer] Brochure and AVS provided today.  Provided [email or web address] website to obtain [place] copies of literature and information.     Dressing removed from left chest device site.  Incision appears well approximated, CDI, SS remain.  Reviewed post op wound care and restrictions.  Pt informed to call office if he develops any fever, chills, increased swelling, redness or drainage from site.  Pt voiced an understanding.    Interrogation available in [place].  Will be uploaded in Epic under Cardiology tab once reviewed by EPMD.  Patient will follow up in 3 months w/EPNP and device.  We will continue to monitor remotely.

## 2024-02-12 RX ORDER — FUROSEMIDE 40 MG/1
40 TABLET ORAL DAILY
Qty: 90 TABLET | Refills: 1 | Status: SHIPPED | OUTPATIENT
Start: 2024-02-12

## 2024-02-27 DIAGNOSIS — E78.2 MIXED HYPERLIPIDEMIA: ICD-10-CM

## 2024-02-28 RX ORDER — ATORVASTATIN CALCIUM 40 MG/1
40 TABLET, FILM COATED ORAL DAILY
Qty: 90 TABLET | Refills: 1 | OUTPATIENT
Start: 2024-02-28

## 2024-03-05 DIAGNOSIS — E78.2 MIXED HYPERLIPIDEMIA: ICD-10-CM

## 2024-03-05 RX ORDER — ATORVASTATIN CALCIUM 40 MG/1
40 TABLET, FILM COATED ORAL DAILY
Qty: 90 TABLET | Refills: 1 | Status: SHIPPED | OUTPATIENT
Start: 2024-03-05

## 2024-03-21 SDOH — ECONOMIC STABILITY: INCOME INSECURITY: HOW HARD IS IT FOR YOU TO PAY FOR THE VERY BASICS LIKE FOOD, HOUSING, MEDICAL CARE, AND HEATING?: NOT VERY HARD

## 2024-03-21 SDOH — ECONOMIC STABILITY: FOOD INSECURITY: WITHIN THE PAST 12 MONTHS, THE FOOD YOU BOUGHT JUST DIDN'T LAST AND YOU DIDN'T HAVE MONEY TO GET MORE.: NEVER TRUE

## 2024-03-21 SDOH — ECONOMIC STABILITY: FOOD INSECURITY: WITHIN THE PAST 12 MONTHS, YOU WORRIED THAT YOUR FOOD WOULD RUN OUT BEFORE YOU GOT MONEY TO BUY MORE.: NEVER TRUE

## 2024-03-21 SDOH — ECONOMIC STABILITY: TRANSPORTATION INSECURITY
IN THE PAST 12 MONTHS, HAS LACK OF TRANSPORTATION KEPT YOU FROM MEETINGS, WORK, OR FROM GETTING THINGS NEEDED FOR DAILY LIVING?: NO

## 2024-03-21 ASSESSMENT — PATIENT HEALTH QUESTIONNAIRE - PHQ9
SUM OF ALL RESPONSES TO PHQ9 QUESTIONS 1 & 2: 0
SUM OF ALL RESPONSES TO PHQ QUESTIONS 1-9: 0
SUM OF ALL RESPONSES TO PHQ QUESTIONS 1-9: 0
2. FEELING DOWN, DEPRESSED OR HOPELESS: NOT AT ALL
SUM OF ALL RESPONSES TO PHQ QUESTIONS 1-9: 0
1. LITTLE INTEREST OR PLEASURE IN DOING THINGS: NOT AT ALL
SUM OF ALL RESPONSES TO PHQ QUESTIONS 1-9: 0

## 2024-03-21 ASSESSMENT — LIFESTYLE VARIABLES
HOW OFTEN DO YOU HAVE A DRINK CONTAINING ALCOHOL: MONTHLY OR LESS
HOW MANY STANDARD DRINKS CONTAINING ALCOHOL DO YOU HAVE ON A TYPICAL DAY: 1 OR 2
HOW OFTEN DO YOU HAVE A DRINK CONTAINING ALCOHOL: 2
HOW OFTEN DO YOU HAVE SIX OR MORE DRINKS ON ONE OCCASION: 1
HOW MANY STANDARD DRINKS CONTAINING ALCOHOL DO YOU HAVE ON A TYPICAL DAY: 1

## 2024-03-22 ENCOUNTER — OFFICE VISIT (OUTPATIENT)
Dept: INTERNAL MEDICINE CLINIC | Age: 84
End: 2024-03-22

## 2024-03-22 VITALS
TEMPERATURE: 97.8 F | BODY MASS INDEX: 34.41 KG/M2 | SYSTOLIC BLOOD PRESSURE: 134 MMHG | HEART RATE: 72 BPM | OXYGEN SATURATION: 96 % | WEIGHT: 206.8 LBS | DIASTOLIC BLOOD PRESSURE: 70 MMHG

## 2024-03-22 DIAGNOSIS — I50.32 CHRONIC DIASTOLIC HEART FAILURE (HCC): ICD-10-CM

## 2024-03-22 DIAGNOSIS — Z00.00 MEDICARE ANNUAL WELLNESS VISIT, SUBSEQUENT: Primary | ICD-10-CM

## 2024-03-22 DIAGNOSIS — N18.31 STAGE 3A CHRONIC KIDNEY DISEASE (HCC): ICD-10-CM

## 2024-03-22 DIAGNOSIS — M15.9 PRIMARY OSTEOARTHRITIS INVOLVING MULTIPLE JOINTS: ICD-10-CM

## 2024-03-22 DIAGNOSIS — E78.2 MIXED HYPERLIPIDEMIA: ICD-10-CM

## 2024-03-22 DIAGNOSIS — I48.19 PERSISTENT ATRIAL FIBRILLATION (HCC): ICD-10-CM

## 2024-03-22 DIAGNOSIS — I10 ESSENTIAL HYPERTENSION: ICD-10-CM

## 2024-03-22 DIAGNOSIS — B35.1 ONYCHOMYCOSIS: ICD-10-CM

## 2024-03-22 DIAGNOSIS — M1A.9XX0 CHRONIC GOUT WITHOUT TOPHUS, UNSPECIFIED CAUSE, UNSPECIFIED SITE: ICD-10-CM

## 2024-03-22 RX ORDER — AMLODIPINE BESYLATE 2.5 MG/1
2.5 TABLET ORAL DAILY
Qty: 90 TABLET | Refills: 1 | Status: SHIPPED | OUTPATIENT
Start: 2024-03-22

## 2024-03-22 RX ORDER — TROSPIUM CHLORIDE 20 MG/1
20 TABLET, FILM COATED ORAL 2 TIMES DAILY
COMMUNITY
Start: 2024-01-22

## 2024-03-22 RX ORDER — AMLODIPINE BESYLATE 2.5 MG/1
2.5 TABLET ORAL DAILY
Qty: 90 TABLET | Refills: 1 | Status: SHIPPED | OUTPATIENT
Start: 2024-03-22 | End: 2024-03-22 | Stop reason: ALTCHOICE

## 2024-03-22 SDOH — ECONOMIC STABILITY: INCOME INSECURITY
HOW HARD IS IT FOR YOU TO PAY FOR THE VERY BASICS LIKE FOOD, HOUSING, MEDICAL CARE, AND HEATING?: PATIENT UNABLE TO ANSWER

## 2024-03-22 SDOH — ECONOMIC STABILITY: FOOD INSECURITY
WITHIN THE PAST 12 MONTHS, THE FOOD YOU BOUGHT JUST DIDN'T LAST AND YOU DIDN'T HAVE MONEY TO GET MORE.: PATIENT UNABLE TO ANSWER

## 2024-03-22 SDOH — ECONOMIC STABILITY: FOOD INSECURITY
WITHIN THE PAST 12 MONTHS, YOU WORRIED THAT YOUR FOOD WOULD RUN OUT BEFORE YOU GOT MONEY TO BUY MORE.: PATIENT UNABLE TO ANSWER

## 2024-03-22 SDOH — ECONOMIC STABILITY: HOUSING INSECURITY
IN THE LAST 12 MONTHS, WAS THERE A TIME WHEN YOU DID NOT HAVE A STEADY PLACE TO SLEEP OR SLEPT IN A SHELTER (INCLUDING NOW)?: PATIENT UNABLE TO ANSWER

## 2024-03-22 ASSESSMENT — ENCOUNTER SYMPTOMS
RESPIRATORY NEGATIVE: 1
CONSTIPATION: 1

## 2024-03-22 ASSESSMENT — PATIENT HEALTH QUESTIONNAIRE - PHQ9
SUM OF ALL RESPONSES TO PHQ QUESTIONS 1-9: 0
2. FEELING DOWN, DEPRESSED OR HOPELESS: NOT AT ALL
SUM OF ALL RESPONSES TO PHQ QUESTIONS 1-9: 0
1. LITTLE INTEREST OR PLEASURE IN DOING THINGS: NOT AT ALL
SUM OF ALL RESPONSES TO PHQ QUESTIONS 1-9: 0
SUM OF ALL RESPONSES TO PHQ QUESTIONS 1-9: 0
SUM OF ALL RESPONSES TO PHQ9 QUESTIONS 1 & 2: 0

## 2024-03-22 NOTE — PROGRESS NOTES
shower bars or grab bars in your shower or bathtub?: (!) No  Interventions:  Home safety tips were provided    ADL's:   Patient reports needing help with:  Select all that apply: (!) Dressing, Grooming  Select all that apply: (!) Housekeeping, Shopping, Food Preparation, Transportation, Taking Medications  Interventions:  Able to perform his ADLs              Objective   Vitals:    03/22/24 1046   BP: 134/70   Site: Left Upper Arm   Position: Sitting   Cuff Size: Large Adult   Pulse: 72   Temp: 97.8 °F (36.6 °C)   SpO2: 96%   Weight: 93.8 kg (206 lb 12.8 oz)      Body mass index is 34.41 kg/m².      General Appearance: alert and oriented to person, place and time, well developed and well- nourished, in no acute distress  Skin: warm and dry, no rash or erythema  Head: normocephalic and atraumatic  Eyes: pupils equal, round, and reactive to light, extraocular eye movements intact, conjunctivae normal  ENT: tympanic membrane, external ear and ear canal normal bilaterally, nose without deformity, nasal mucosa and turbinates normal without polyps  Neck: supple and non-tender without mass, no thyromegaly or thyroid nodules, no cervical lymphadenopathy  Pulmonary/Chest: clear to auscultation bilaterally- no wheezes, rales or rhonchi, normal air movement, no respiratory distress  Cardiovascular: normal rate, regular rhythm, normal S1 and S2, no murmurs, rubs, clicks, or gallops, distal pulses intact, no carotid bruits.  There is subcutaneous pacemaker located in the left upper chest wall below the clavicle  Abdomen: soft, non-tender, non-distended, normal bowel sounds, no masses or organomegaly  Extremities: no cyanosis, no clubbing.  There is 1-2+ pitting below the knee bilateral lower extremity edema  Musculoskeletal: normal range of motion, no joint swelling, deformity or tenderness  Neurologic: reflexes normal and symmetric, no cranial nerve deficit, gait, coordination and speech normal       Allergies   Allergen

## 2024-03-22 NOTE — PATIENT INSTRUCTIONS

## 2024-04-03 NOTE — PROGRESS NOTES
exercise, manage stress sleep apnea evaluation and symptoms of a stroke.  - Reviewed recent labs  - ECG ordered and results personally reviewed     CAD  - No s/s  - On ASA, statin  - Follows w/ Dr. Ni    HTN  - BP well controlled in the office today  - On Toprol XL 25 mg QD, amlodipine 2..5 mg QD    EF of 55-60%  No systolic HF  ASA and Statin for CAD  Anticoagulation for AF  No Tobacco use.       All questions and concerns were addressed to the patient/family. Alternatives to my treatment were discussed. The note was completed using EMR. Every effort was made to ensure accuracy; however, inadvertent computerized transcription errors may be present.     Patient received education regarding their diagnosis, treatment and medications while in the office today.      Graeme [facility]    I  have spent 40 minutes in care of the patient including direct face to face time, chart preparation, reviewing diagnostic testing, other provider notes and coordinating patient care.

## 2024-04-09 ENCOUNTER — NURSE ONLY (OUTPATIENT)
Dept: CARDIOLOGY CLINIC | Age: 84
End: 2024-04-09

## 2024-04-09 ENCOUNTER — OFFICE VISIT (OUTPATIENT)
Dept: CARDIOLOGY CLINIC | Age: 84
End: 2024-04-09
Payer: COMMERCIAL

## 2024-04-09 VITALS
SYSTOLIC BLOOD PRESSURE: 132 MMHG | WEIGHT: 207 LBS | DIASTOLIC BLOOD PRESSURE: 68 MMHG | BODY MASS INDEX: 34.45 KG/M2 | HEART RATE: 66 BPM

## 2024-04-09 DIAGNOSIS — Z95.0 CARDIAC PACEMAKER IN SITU: Primary | ICD-10-CM

## 2024-04-09 DIAGNOSIS — I50.32 CHRONIC DIASTOLIC CHF (CONGESTIVE HEART FAILURE) (HCC): ICD-10-CM

## 2024-04-09 DIAGNOSIS — Z95.0 PACEMAKER: ICD-10-CM

## 2024-04-09 DIAGNOSIS — I49.5 TACHY-BRADY SYNDROME (HCC): ICD-10-CM

## 2024-04-09 DIAGNOSIS — I10 BENIGN ESSENTIAL HTN: ICD-10-CM

## 2024-04-09 DIAGNOSIS — I49.5 SSS (SICK SINUS SYNDROME) (HCC): ICD-10-CM

## 2024-04-09 DIAGNOSIS — I49.49 JUNCTIONAL ESCAPE BEATS: ICD-10-CM

## 2024-04-09 DIAGNOSIS — I25.10 CORONARY ARTERY DISEASE INVOLVING NATIVE CORONARY ARTERY OF NATIVE HEART WITHOUT ANGINA PECTORIS: ICD-10-CM

## 2024-04-09 DIAGNOSIS — I48.20 CHRONIC ATRIAL FIBRILLATION (HCC): Primary | ICD-10-CM

## 2024-04-09 PROBLEM — I50.9 ACUTE CONGESTIVE HEART FAILURE (HCC): Status: RESOLVED | Noted: 2022-03-17 | Resolved: 2024-04-09

## 2024-04-09 PROBLEM — J81.0 ACUTE PULMONARY EDEMA (HCC): Status: RESOLVED | Noted: 2022-03-17 | Resolved: 2024-04-09

## 2024-04-09 PROCEDURE — 1123F ACP DISCUSS/DSCN MKR DOCD: CPT | Performed by: NURSE PRACTITIONER

## 2024-04-09 PROCEDURE — 93000 ELECTROCARDIOGRAM COMPLETE: CPT | Performed by: NURSE PRACTITIONER

## 2024-04-09 PROCEDURE — 99215 OFFICE O/P EST HI 40 MIN: CPT | Performed by: NURSE PRACTITIONER

## 2024-04-09 PROCEDURE — 3078F DIAST BP <80 MM HG: CPT | Performed by: NURSE PRACTITIONER

## 2024-04-09 PROCEDURE — 3075F SYST BP GE 130 - 139MM HG: CPT | Performed by: NURSE PRACTITIONER

## 2024-05-13 ENCOUNTER — TELEPHONE (OUTPATIENT)
Dept: INTERNAL MEDICINE CLINIC | Age: 84
End: 2024-05-13

## 2024-05-13 NOTE — TELEPHONE ENCOUNTER
Dianna from [facility] is requesting an RX for grab bars, four bars two for each bathroom.    Fax: [phone]

## 2024-06-03 RX ORDER — METOPROLOL SUCCINATE 25 MG/1
25 TABLET, EXTENDED RELEASE ORAL DAILY
Qty: 90 TABLET | Refills: 3 | Status: SHIPPED | OUTPATIENT
Start: 2024-06-03

## 2024-06-03 NOTE — TELEPHONE ENCOUNTER
Requested Prescriptions     Pending Prescriptions Disp Refills    metoprolol succinate (TOPROL XL) 25 MG extended release tablet [Pharmacy Med Name: METOPROLOL ER SUCCINATE 25MG TABS] 90 tablet 3     Sig: TAKE 1 TABLET BY MOUTH DAILY            Last Office Visit: 4/9/2024     Next Office Visit: 10.03.2024      Last Labs: 01.25.2024

## 2024-06-05 DIAGNOSIS — M1A.9XX0 CHRONIC GOUT WITHOUT TOPHUS, UNSPECIFIED CAUSE, UNSPECIFIED SITE: ICD-10-CM

## 2024-06-09 RX ORDER — ALLOPURINOL 300 MG/1
TABLET ORAL
Qty: 90 TABLET | Refills: 1 | Status: SHIPPED | OUTPATIENT
Start: 2024-06-09

## 2024-06-28 ENCOUNTER — OFFICE VISIT (OUTPATIENT)
Dept: INTERNAL MEDICINE CLINIC | Age: 84
End: 2024-06-28

## 2024-06-28 VITALS — DIASTOLIC BLOOD PRESSURE: 68 MMHG | WEIGHT: 208 LBS | SYSTOLIC BLOOD PRESSURE: 124 MMHG | BODY MASS INDEX: 34.61 KG/M2

## 2024-06-28 DIAGNOSIS — E78.2 MIXED HYPERLIPIDEMIA: ICD-10-CM

## 2024-06-28 DIAGNOSIS — N18.31 STAGE 3A CHRONIC KIDNEY DISEASE (HCC): ICD-10-CM

## 2024-06-28 DIAGNOSIS — M1A.0720 IDIOPATHIC CHRONIC GOUT OF LEFT FOOT WITHOUT TOPHUS: ICD-10-CM

## 2024-06-28 DIAGNOSIS — I10 ESSENTIAL HYPERTENSION: ICD-10-CM

## 2024-06-28 DIAGNOSIS — R73.03 PRE-DIABETES: ICD-10-CM

## 2024-06-28 DIAGNOSIS — M79.604 BILATERAL LEG PAIN: ICD-10-CM

## 2024-06-28 DIAGNOSIS — I50.32 CHRONIC DIASTOLIC HEART FAILURE (HCC): ICD-10-CM

## 2024-06-28 DIAGNOSIS — Z12.5 PROSTATE CANCER SCREENING: ICD-10-CM

## 2024-06-28 DIAGNOSIS — M79.605 BILATERAL LEG PAIN: ICD-10-CM

## 2024-06-28 DIAGNOSIS — I48.19 PERSISTENT ATRIAL FIBRILLATION (HCC): ICD-10-CM

## 2024-06-28 DIAGNOSIS — F51.01 PRIMARY INSOMNIA: Primary | ICD-10-CM

## 2024-06-28 RX ORDER — HYDROCODONE BITARTRATE AND ACETAMINOPHEN 5; 325 MG/1; MG/1
1 TABLET ORAL 2 TIMES DAILY PRN
Qty: 28 TABLET | Refills: 0 | Status: SHIPPED | OUTPATIENT
Start: 2024-06-28 | End: 2024-07-26

## 2024-06-28 RX ORDER — ZOLPIDEM TARTRATE 10 MG/1
10 TABLET ORAL NIGHTLY PRN
Qty: 90 TABLET | Refills: 0 | Status: SHIPPED | OUTPATIENT
Start: 2024-06-28 | End: 2024-09-26

## 2024-06-28 NOTE — PROGRESS NOTES
Follow Up Visit Established Patient Visit    Patient:  Harjeet Ivy                                               : 1940  Age: 83 y.o.  MRN: [id number]  Date : 2024    Harjeet Ivy is a 83 y.o. male who presents for : Scheduled follow-up appointment    Chief Complaint   Patient presents with    Hypertension     Reports difficulties falling asleep. + pain in both quads and calves at night.  Uses applications of diclofenac gel as needed.  Occasionally takes small amount of hydrocodone and Tylenol with good pain relief.  No CP/SOB; no heart palpitations.  Checks BP frequently; SBP ranges 125-135 mm Hg.  Had a bout of R foot gout 1 week ago.Took colchicine 1.2 gm followed by 0.6 mg 6 hours later with symptom improvement.  + bilateral shin hyperpigmentation over the distal half of both shins  Follows up with Dr Waldrop, urologist.  Tolerates apixaban well no recent heart palpitations.  No gross hematuria/melena.    Past Medical History:   Diagnosis Date    Contact dermatitis and other eczema, due to unspecified cause     Coronary atherosclerosis of unspecified type of vessel, native or graft     Cardio- Dr. Tian Petty    Diastolic CHF (HCC)     Gout, unspecified     Hypertension     Hypertrophy of prostate without urinary obstruction and other lower urinary tract symptoms (LUTS)     Other and unspecified hyperlipidemia     Peripheral vascular complications        Past Surgical History:   Procedure Laterality Date    CARDIAC SURGERY      4 vessel cabg Dr. Anuj chester    COLONOSCOPY  07    Dr. Lino Trinh polyps-repeat 2012    COLONOSCOPY  5.23.13    Gayathri ARREGUIN polyp repeat 2018    COLONOSCOPY  2018    Dr. Lino Trinh polyp removed no repeat necessary    CORONARY ARTERY BYPASS GRAFT      Dr. Worthy       Current Outpatient Medications   Medication Sig Dispense Refill    zolpidem (AMBIEN) 10 MG tablet Take 1 tablet by mouth nightly as needed for Sleep for up to 90 days. Max Daily Amount: 10 mg 90 tablet 0

## 2024-07-06 DIAGNOSIS — I50.32 CHRONIC DIASTOLIC CONGESTIVE HEART FAILURE (HCC): ICD-10-CM

## 2024-07-09 RX ORDER — ISOSORBIDE MONONITRATE 60 MG/1
60 TABLET, EXTENDED RELEASE ORAL 2 TIMES DAILY
Qty: 180 TABLET | Refills: 0 | Status: SHIPPED | OUTPATIENT
Start: 2024-07-09

## 2024-07-12 DIAGNOSIS — I48.0 PAROXYSMAL ATRIAL FIBRILLATION (HCC): ICD-10-CM

## 2024-07-16 RX ORDER — APIXABAN 5 MG/1
5 TABLET, FILM COATED ORAL 2 TIMES DAILY
Qty: 180 TABLET | Refills: 3 | Status: SHIPPED | OUTPATIENT
Start: 2024-07-16

## 2024-07-22 ENCOUNTER — TELEPHONE (OUTPATIENT)
Dept: INTERNAL MEDICINE CLINIC | Age: 84
End: 2024-07-22

## 2024-07-22 NOTE — TELEPHONE ENCOUNTER
Jamar from [facility]   Is requesting an Updated Medication list to be sent to:    Fax [phone]  The fax and phone number is the same.

## 2024-08-08 RX ORDER — FUROSEMIDE 40 MG/1
40 TABLET ORAL DAILY
Qty: 90 TABLET | Refills: 1 | Status: SHIPPED | OUTPATIENT
Start: 2024-08-08

## 2024-09-03 DIAGNOSIS — E78.2 MIXED HYPERLIPIDEMIA: ICD-10-CM

## 2024-09-03 RX ORDER — ATORVASTATIN CALCIUM 40 MG/1
40 TABLET, FILM COATED ORAL DAILY
Qty: 90 TABLET | Refills: 1 | Status: SHIPPED | OUTPATIENT
Start: 2024-09-03

## 2024-09-09 DIAGNOSIS — N18.31 STAGE 3A CHRONIC KIDNEY DISEASE (HCC): ICD-10-CM

## 2024-09-09 DIAGNOSIS — I10 ESSENTIAL HYPERTENSION: ICD-10-CM

## 2024-09-09 DIAGNOSIS — Z12.5 PROSTATE CANCER SCREENING: ICD-10-CM

## 2024-09-09 DIAGNOSIS — R73.03 PRE-DIABETES: ICD-10-CM

## 2024-09-09 DIAGNOSIS — M1A.0720 IDIOPATHIC CHRONIC GOUT OF LEFT FOOT WITHOUT TOPHUS: ICD-10-CM

## 2024-09-09 DIAGNOSIS — E78.2 MIXED HYPERLIPIDEMIA: ICD-10-CM

## 2024-09-09 LAB
ALBUMIN SERPL-MCNC: 4.5 G/DL (ref 3.4–5)
ALBUMIN/GLOB SERPL: 2 {RATIO} (ref 1.1–2.2)
ALP SERPL-CCNC: 75 U/L (ref 40–129)
ALT SERPL-CCNC: 21 U/L (ref 10–40)
ANION GAP SERPL CALCULATED.3IONS-SCNC: 12 MMOL/L (ref 3–16)
AST SERPL-CCNC: 22 U/L (ref 15–37)
BASOPHILS # BLD: 0 K/UL (ref 0–0.2)
BASOPHILS NFR BLD: 0.5 %
BILIRUB SERPL-MCNC: 0.7 MG/DL (ref 0–1)
BUN SERPL-MCNC: 23 MG/DL (ref 7–20)
CALCIUM SERPL-MCNC: 10.3 MG/DL (ref 8.3–10.6)
CHLORIDE SERPL-SCNC: 102 MMOL/L (ref 99–110)
CHOLEST SERPL-MCNC: 181 MG/DL (ref 0–199)
CO2 SERPL-SCNC: 27 MMOL/L (ref 21–32)
CREAT SERPL-MCNC: 1.3 MG/DL (ref 0.8–1.3)
DEPRECATED RDW RBC AUTO: 16.8 % (ref 12.4–15.4)
EOSINOPHIL # BLD: 0.2 K/UL (ref 0–0.6)
EOSINOPHIL NFR BLD: 3.5 %
GFR SERPLBLD CREATININE-BSD FMLA CKD-EPI: 55 ML/MIN/{1.73_M2}
GLUCOSE SERPL-MCNC: 93 MG/DL (ref 70–99)
HCT VFR BLD AUTO: 41.8 % (ref 40.5–52.5)
HDLC SERPL-MCNC: 38 MG/DL (ref 40–60)
HGB BLD-MCNC: 13.3 G/DL (ref 13.5–17.5)
LDLC SERPL CALC-MCNC: 116 MG/DL
LYMPHOCYTES # BLD: 1.1 K/UL (ref 1–5.1)
LYMPHOCYTES NFR BLD: 16 %
MCH RBC QN AUTO: 26.2 PG (ref 26–34)
MCHC RBC AUTO-ENTMCNC: 31.8 G/DL (ref 31–36)
MCV RBC AUTO: 82.5 FL (ref 80–100)
MONOCYTES # BLD: 0.8 K/UL (ref 0–1.3)
MONOCYTES NFR BLD: 11.6 %
NEUTROPHILS # BLD: 4.5 K/UL (ref 1.7–7.7)
NEUTROPHILS NFR BLD: 68.4 %
PLATELET # BLD AUTO: 219 K/UL (ref 135–450)
PMV BLD AUTO: 9 FL (ref 5–10.5)
POTASSIUM SERPL-SCNC: 5.3 MMOL/L (ref 3.5–5.1)
PROT SERPL-MCNC: 6.7 G/DL (ref 6.4–8.2)
PSA SERPL DL<=0.01 NG/ML-MCNC: 0.39 NG/ML (ref 0–4)
RBC # BLD AUTO: 5.07 M/UL (ref 4.2–5.9)
SODIUM SERPL-SCNC: 141 MMOL/L (ref 136–145)
TRIGL SERPL-MCNC: 135 MG/DL (ref 0–150)
TSH SERPL DL<=0.005 MIU/L-ACNC: 3.22 UIU/ML (ref 0.27–4.2)
URATE SERPL-MCNC: 8.7 MG/DL (ref 3.5–7.2)
VLDLC SERPL CALC-MCNC: 27 MG/DL
WBC # BLD AUTO: 6.6 K/UL (ref 4–11)

## 2024-09-10 LAB
EST. AVERAGE GLUCOSE BLD GHB EST-MCNC: 128.4 MG/DL
HBA1C MFR BLD: 6.1 %

## 2024-09-25 DIAGNOSIS — I10 ESSENTIAL HYPERTENSION: ICD-10-CM

## 2024-09-25 RX ORDER — AMLODIPINE BESYLATE 2.5 MG/1
2.5 TABLET ORAL DAILY
Qty: 90 TABLET | Refills: 1 | Status: SHIPPED | OUTPATIENT
Start: 2024-09-25

## 2024-09-27 ENCOUNTER — OFFICE VISIT (OUTPATIENT)
Dept: INTERNAL MEDICINE CLINIC | Age: 84
End: 2024-09-27

## 2024-09-27 VITALS
HEART RATE: 73 BPM | WEIGHT: 208.6 LBS | DIASTOLIC BLOOD PRESSURE: 70 MMHG | OXYGEN SATURATION: 96 % | SYSTOLIC BLOOD PRESSURE: 134 MMHG | TEMPERATURE: 97.2 F | BODY MASS INDEX: 34.71 KG/M2

## 2024-09-27 DIAGNOSIS — M54.42 CHRONIC BILATERAL LOW BACK PAIN WITH LEFT-SIDED SCIATICA: ICD-10-CM

## 2024-09-27 DIAGNOSIS — F51.01 PRIMARY INSOMNIA: ICD-10-CM

## 2024-09-27 DIAGNOSIS — M1A.0720 IDIOPATHIC CHRONIC GOUT OF LEFT FOOT WITHOUT TOPHUS: ICD-10-CM

## 2024-09-27 DIAGNOSIS — G89.29 CHRONIC BILATERAL LOW BACK PAIN WITH LEFT-SIDED SCIATICA: ICD-10-CM

## 2024-09-27 DIAGNOSIS — I10 ESSENTIAL HYPERTENSION: Primary | ICD-10-CM

## 2024-09-27 DIAGNOSIS — I48.0 PAROXYSMAL ATRIAL FIBRILLATION (HCC): ICD-10-CM

## 2024-09-27 DIAGNOSIS — E78.2 MIXED HYPERLIPIDEMIA: ICD-10-CM

## 2024-09-27 RX ORDER — MIRABEGRON 50 MG/1
50 TABLET, EXTENDED RELEASE ORAL DAILY
COMMUNITY

## 2024-09-27 RX ORDER — ACETAMINOPHEN AND CODEINE PHOSPHATE 300; 15 MG/1; MG/1
1 TABLET ORAL 2 TIMES DAILY PRN
Qty: 28 TABLET | Refills: 0 | Status: SHIPPED | OUTPATIENT
Start: 2024-09-27 | End: 2024-10-11

## 2024-09-27 RX ORDER — ZOLPIDEM TARTRATE 10 MG/1
10 TABLET ORAL NIGHTLY PRN
COMMUNITY
Start: 2024-07-01

## 2024-09-30 NOTE — PROGRESS NOTES
depression  Ophthalmic ROS: negative for - eye pain or loss of vision  ENT ROS: negative for - epistaxis, headaches, nasal discharge, sore throat   Allergy and Immunology ROS: negative for - hives, nasal congestion   Hematological and Lymphatic ROS: negative for - bleeding problems, blood clots, bruising or jaundice  Endocrine ROS: negative for - skin changes, temperature intolerance or unexpected weight changes  Respiratory ROS: negative for - cough, hemoptysis, pleuritic pain, SOB, sputum changes or wheezing  Cardiovascular ROS: Per HPI.   Gastrointestinal ROS: negative for - abdominal pain, blood in stools, diarrhea, hematemesis, melena, nausea/vomiting or swallowing difficulty/pain  Genito-Urinary ROS: negative for - dysuria or incontinence  Musculoskeletal ROS: negative for - joint swelling or muscle pain  Neurological ROS: negative for - confusion, dizziness, gait disturbance, headaches, numbness/tingling, seizures, speech problems, tremors, visual changes or weakness  Dermatological ROS: negative for - rash    Physical Examination:  Vitals:    10/03/24 1306   BP: 134/70   Pulse: 65   SpO2: 96%         Constitutional: Oriented. No distress.   Head: Normocephalic and atraumatic.   Mouth/Throat: Oropharynx is clear and moist.   Eyes: Conjunctivae normal. EOM are normal.   Neck: Normal range of motion. Neck supple. No rigidity.  No JVD present.   Cardiovascular: Normal rate, irregular rhythm, S1&S2 and intact distal pulses.   Pulmonary/Chest: Bilateral respiratory sounds. No wheezes. No rhonchi.    Abdominal: Soft. Bowel sounds present. No distension, No tenderness.   Musculoskeletal: No tenderness. No edema    Lymphadenopathy: Has no cervical adenopathy.   Neurological: Alert and oriented. Cranial nerve appears intact, No Gross deficit   Skin: Skin is warm and dry. No rash noted.   Psychiatric: Has a normal mood, affect and behavior     Labs:  Reviewed.     ECG: reviewed, AF with PVC, V rate 66    Studies:   1.  
Overall findings represent a low risk scan.    Normal LV size and systolic function.    Small fixed defect involving the apex, otherwise no evidence of ischemia.    ECG: Non-diagnostic EKG response due to baseline abnormalities.       The MCOT, echocardiogram, stress test, and coronary angiography/PCI were reviewed by myself and used for my plan of care.     - The patient is counseled to follow a low salt diet to assure blood pressure remains controlled for cardiovascular risk factor modification.   - The patient is counseled to avoid excess caffeine, and energy drinks as this may exacerbated ectopy and arrhythmia.   - The patient is counseled to get regular exercise 3-5 times per week to control cardiovascular risk factors.   - The patient is counseled to lose weigt to control cardiovascular risk factors.  -The patient is counseled about the health hazards of smoking including cardiovascular side effects and its impact on morbidity and mortality.  Thank you for allowing me to participate in the care of Harjeet Ivy. All questions and concerns were addressed to the patient/family. Alternatives to my treatment were discussed.         Anastasia Valentin MD  Cardiac Electrophysiology  [facility]

## 2024-10-03 ENCOUNTER — NURSE ONLY (OUTPATIENT)
Dept: CARDIOLOGY CLINIC | Age: 84
End: 2024-10-03

## 2024-10-03 ENCOUNTER — OFFICE VISIT (OUTPATIENT)
Dept: CARDIOLOGY CLINIC | Age: 84
End: 2024-10-03
Payer: COMMERCIAL

## 2024-10-03 VITALS
HEART RATE: 65 BPM | OXYGEN SATURATION: 96 % | HEIGHT: 65 IN | SYSTOLIC BLOOD PRESSURE: 134 MMHG | BODY MASS INDEX: 34.99 KG/M2 | WEIGHT: 210 LBS | DIASTOLIC BLOOD PRESSURE: 70 MMHG

## 2024-10-03 DIAGNOSIS — I10 BENIGN ESSENTIAL HTN: ICD-10-CM

## 2024-10-03 DIAGNOSIS — R00.1 BRADYCARDIA: ICD-10-CM

## 2024-10-03 DIAGNOSIS — I49.3 PVC (PREMATURE VENTRICULAR CONTRACTION): ICD-10-CM

## 2024-10-03 DIAGNOSIS — I49.5 TACHY-BRADY SYNDROME (HCC): ICD-10-CM

## 2024-10-03 DIAGNOSIS — I50.32 CHRONIC DIASTOLIC CHF (CONGESTIVE HEART FAILURE) (HCC): ICD-10-CM

## 2024-10-03 DIAGNOSIS — G47.33 OSA (OBSTRUCTIVE SLEEP APNEA): ICD-10-CM

## 2024-10-03 DIAGNOSIS — I49.5 SSS (SICK SINUS SYNDROME) (HCC): ICD-10-CM

## 2024-10-03 DIAGNOSIS — Z95.0 PACEMAKER: ICD-10-CM

## 2024-10-03 DIAGNOSIS — Z95.0 CARDIAC PACEMAKER IN SITU: Primary | ICD-10-CM

## 2024-10-03 DIAGNOSIS — I25.10 CORONARY ARTERY DISEASE INVOLVING NATIVE CORONARY ARTERY OF NATIVE HEART WITHOUT ANGINA PECTORIS: ICD-10-CM

## 2024-10-03 DIAGNOSIS — E78.5 HYPERLIPIDEMIA, UNSPECIFIED HYPERLIPIDEMIA TYPE: ICD-10-CM

## 2024-10-03 DIAGNOSIS — I48.19 PERSISTENT ATRIAL FIBRILLATION (HCC): ICD-10-CM

## 2024-10-03 DIAGNOSIS — I48.11 LONGSTANDING PERSISTENT ATRIAL FIBRILLATION (HCC): Primary | ICD-10-CM

## 2024-10-03 PROCEDURE — 93000 ELECTROCARDIOGRAM COMPLETE: CPT | Performed by: INTERNAL MEDICINE

## 2024-10-03 PROCEDURE — 99214 OFFICE O/P EST MOD 30 MIN: CPT | Performed by: INTERNAL MEDICINE

## 2024-10-03 PROCEDURE — 3075F SYST BP GE 130 - 139MM HG: CPT | Performed by: INTERNAL MEDICINE

## 2024-10-03 PROCEDURE — 1123F ACP DISCUSS/DSCN MKR DOCD: CPT | Performed by: INTERNAL MEDICINE

## 2024-10-03 PROCEDURE — 3078F DIAST BP <80 MM HG: CPT | Performed by: INTERNAL MEDICINE

## 2024-10-05 DIAGNOSIS — I50.32 CHRONIC DIASTOLIC CONGESTIVE HEART FAILURE (HCC): ICD-10-CM

## 2024-10-07 RX ORDER — ISOSORBIDE MONONITRATE 60 MG/1
60 TABLET, EXTENDED RELEASE ORAL 2 TIMES DAILY
Qty: 180 TABLET | Refills: 1 | Status: SHIPPED | OUTPATIENT
Start: 2024-10-07

## 2024-11-15 DIAGNOSIS — M1A.9XX0 CHRONIC GOUT WITHOUT TOPHUS, UNSPECIFIED CAUSE, UNSPECIFIED SITE: ICD-10-CM

## 2024-11-18 RX ORDER — ALLOPURINOL 300 MG/1
TABLET ORAL
Qty: 90 TABLET | Refills: 1 | Status: SHIPPED | OUTPATIENT
Start: 2024-11-18

## 2024-11-20 DIAGNOSIS — I48.0 PAROXYSMAL ATRIAL FIBRILLATION (HCC): ICD-10-CM

## 2024-12-19 DIAGNOSIS — I10 ESSENTIAL HYPERTENSION: ICD-10-CM

## 2024-12-19 LAB
ALBUMIN SERPL-MCNC: 4.4 G/DL (ref 3.4–5)
ANION GAP SERPL CALCULATED.3IONS-SCNC: 10 MMOL/L (ref 3–16)
BUN SERPL-MCNC: 19 MG/DL (ref 7–20)
CALCIUM SERPL-MCNC: 9.7 MG/DL (ref 8.3–10.6)
CHLORIDE SERPL-SCNC: 104 MMOL/L (ref 99–110)
CO2 SERPL-SCNC: 27 MMOL/L (ref 21–32)
CREAT SERPL-MCNC: 1.3 MG/DL (ref 0.8–1.3)
GFR SERPLBLD CREATININE-BSD FMLA CKD-EPI: 54 ML/MIN/{1.73_M2}
GLUCOSE SERPL-MCNC: 95 MG/DL (ref 70–99)
PHOSPHATE SERPL-MCNC: 3.1 MG/DL (ref 2.5–4.9)
POTASSIUM SERPL-SCNC: 5.2 MMOL/L (ref 3.5–5.1)
SODIUM SERPL-SCNC: 141 MMOL/L (ref 136–145)

## 2024-12-27 ENCOUNTER — OFFICE VISIT (OUTPATIENT)
Dept: INTERNAL MEDICINE CLINIC | Age: 84
End: 2024-12-27
Payer: COMMERCIAL

## 2024-12-27 VITALS
WEIGHT: 218 LBS | HEART RATE: 79 BPM | DIASTOLIC BLOOD PRESSURE: 72 MMHG | SYSTOLIC BLOOD PRESSURE: 138 MMHG | BODY MASS INDEX: 36.28 KG/M2 | OXYGEN SATURATION: 98 % | TEMPERATURE: 98.5 F

## 2024-12-27 DIAGNOSIS — G89.29 CHRONIC BILATERAL LOW BACK PAIN WITH LEFT-SIDED SCIATICA: ICD-10-CM

## 2024-12-27 DIAGNOSIS — I10 ESSENTIAL HYPERTENSION: Primary | ICD-10-CM

## 2024-12-27 DIAGNOSIS — M1A.9XX0 CHRONIC GOUT WITHOUT TOPHUS, UNSPECIFIED CAUSE, UNSPECIFIED SITE: ICD-10-CM

## 2024-12-27 DIAGNOSIS — F51.01 PRIMARY INSOMNIA: ICD-10-CM

## 2024-12-27 DIAGNOSIS — M54.42 CHRONIC BILATERAL LOW BACK PAIN WITH LEFT-SIDED SCIATICA: ICD-10-CM

## 2024-12-27 DIAGNOSIS — E78.2 MIXED HYPERLIPIDEMIA: ICD-10-CM

## 2024-12-27 DIAGNOSIS — M79.674 PAIN DUE TO ONYCHOMYCOSIS OF TOENAILS OF BOTH FEET: ICD-10-CM

## 2024-12-27 DIAGNOSIS — M79.675 PAIN DUE TO ONYCHOMYCOSIS OF TOENAILS OF BOTH FEET: ICD-10-CM

## 2024-12-27 DIAGNOSIS — B35.1 PAIN DUE TO ONYCHOMYCOSIS OF TOENAILS OF BOTH FEET: ICD-10-CM

## 2024-12-27 DIAGNOSIS — I50.32 CHRONIC DIASTOLIC CONGESTIVE HEART FAILURE (HCC): ICD-10-CM

## 2024-12-27 PROCEDURE — 1159F MED LIST DOCD IN RCRD: CPT | Performed by: HOSPITALIST

## 2024-12-27 PROCEDURE — 1123F ACP DISCUSS/DSCN MKR DOCD: CPT | Performed by: HOSPITALIST

## 2024-12-27 PROCEDURE — 3075F SYST BP GE 130 - 139MM HG: CPT | Performed by: HOSPITALIST

## 2024-12-27 PROCEDURE — 99214 OFFICE O/P EST MOD 30 MIN: CPT | Performed by: HOSPITALIST

## 2024-12-27 PROCEDURE — 3078F DIAST BP <80 MM HG: CPT | Performed by: HOSPITALIST

## 2024-12-27 RX ORDER — TERBINAFINE HYDROCHLORIDE 250 MG/1
250 TABLET ORAL DAILY
Qty: 84 TABLET | Refills: 0 | Status: SHIPPED | OUTPATIENT
Start: 2024-12-27 | End: 2025-03-21

## 2024-12-27 RX ORDER — HYDROCODONE BITARTRATE AND ACETAMINOPHEN 5; 325 MG/1; MG/1
1 TABLET ORAL EVERY 8 HOURS PRN
Qty: 30 TABLET | Refills: 0 | Status: SHIPPED | OUTPATIENT
Start: 2024-12-27 | End: 2025-01-10

## 2024-12-27 RX ORDER — ZOLPIDEM TARTRATE 10 MG/1
10 TABLET ORAL NIGHTLY PRN
Qty: 90 TABLET | Refills: 0 | Status: SHIPPED | OUTPATIENT
Start: 2024-12-27 | End: 2025-03-27

## 2024-12-27 NOTE — PROGRESS NOTES
-     Controlled with use of atorvastatin 40 mg daily    Pain due to onychomycosis of toenails of both feet  -     terbinafine (LAMISIL) 250 MG tablet; Take 1 tablet by mouth daily        Return in about 3 months (around 3/27/2025), or if symptoms worsen or fail to improve.    Cornelius Pascual MD

## 2025-01-02 NOTE — PROGRESS NOTES
[facility]     Outpatient Cardiology         Patient Name:  Harjeet Ivy  Requesting Physician: No admitting provider for patient encounter.  Primary Care Physician: Cornelius Pascual MD    Reason for Consultation/Chief Complaint:   Chief Complaint   Patient presents with    3 Month Follow-Up     CAD/Afib    History of Present Illness:    HPI     Harjeet Pepe a 84 y.o. 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 Campbell), ADRIANNA on CPAP, Tricuspid regurgitation.  Here to establish care for HFpEF, CAD, HTN, HLD, afib, TR. Former Dr. Nitesh Ni patient.   HFpEF, on lasix 40 mg daily, compensated, no PND orthopnea.  CAD, on imdur 60 mg daily, no angina,   HTN, on amlodipine 2.5 mg daily, well-controlled no side effects  HLD, Last  (9/9/24), on liptior 40 mg daily, no side effects  Afib, on eliquis 5 mg BID, toprol XL 25 mg daily   Overall feeling good from a cardiac perspective      PMH  Past Medical History:   Diagnosis Date    Abnormal ECG     Atrial fibrillation (HCC)     Contact dermatitis and other eczema, due to unspecified cause     Coronary atherosclerosis of unspecified type of vessel, native or graft     Cardio- Dr. Tian Petty    Diastolic CHF (HCC)     Gout, unspecified     Hypertension     Hypertrophy of prostate without urinary obstruction and other lower urinary tract symptoms (LUTS)     MI (myocardial infarction) (HCC)     Other and unspecified hyperlipidemia     Peripheral vascular complications        PSH  Past Surgical History:   Procedure Laterality Date    CARDIAC SURGERY      4 vessel cabg Dr. Anuj chester    CARDIOVERSION  05/02/2022    DR. Valentin    COLONOSCOPY  02/16/2007    Dr. Lino Trinh polyps-repeat 2/2012    COLONOSCOPY  05/23/2013    Gayathri ARREGUIN polyp repeat 2018    COLONOSCOPY  06/18/2018    Dr. Lino Trinh polyp removed no repeat necessary    CORONARY ARTERY BYPASS GRAFT  2003    Dr. Ivy MOHS SURGERY  08/20/2024

## 2025-01-09 ENCOUNTER — OFFICE VISIT (OUTPATIENT)
Dept: CARDIOLOGY CLINIC | Age: 85
End: 2025-01-09
Payer: COMMERCIAL

## 2025-01-09 VITALS
WEIGHT: 211.4 LBS | HEART RATE: 72 BPM | HEIGHT: 67 IN | BODY MASS INDEX: 33.18 KG/M2 | DIASTOLIC BLOOD PRESSURE: 60 MMHG | OXYGEN SATURATION: 92 % | SYSTOLIC BLOOD PRESSURE: 128 MMHG

## 2025-01-09 DIAGNOSIS — I25.118 ATHEROSCLEROSIS OF NATIVE CORONARY ARTERY OF NATIVE HEART WITH OTHER FORM OF ANGINA PECTORIS (HCC): ICD-10-CM

## 2025-01-09 DIAGNOSIS — I10 ESSENTIAL HYPERTENSION: Primary | ICD-10-CM

## 2025-01-09 DIAGNOSIS — I48.0 PAROXYSMAL ATRIAL FIBRILLATION (HCC): ICD-10-CM

## 2025-01-09 DIAGNOSIS — E78.2 MIXED HYPERLIPIDEMIA: ICD-10-CM

## 2025-01-09 PROCEDURE — 3078F DIAST BP <80 MM HG: CPT | Performed by: INTERNAL MEDICINE

## 2025-01-09 PROCEDURE — 1123F ACP DISCUSS/DSCN MKR DOCD: CPT | Performed by: INTERNAL MEDICINE

## 2025-01-09 PROCEDURE — 1159F MED LIST DOCD IN RCRD: CPT | Performed by: INTERNAL MEDICINE

## 2025-01-09 PROCEDURE — 3074F SYST BP LT 130 MM HG: CPT | Performed by: INTERNAL MEDICINE

## 2025-01-09 PROCEDURE — 99214 OFFICE O/P EST MOD 30 MIN: CPT | Performed by: INTERNAL MEDICINE

## 2025-01-09 ASSESSMENT — ENCOUNTER SYMPTOMS
WHEEZING: 0
COUGH: 0
FACIAL SWELLING: 0
ABDOMINAL DISTENTION: 0
BACK PAIN: 0
ABDOMINAL PAIN: 0
BLOOD IN STOOL: 0
EYE DISCHARGE: 0
CHEST TIGHTNESS: 0
SHORTNESS OF BREATH: 0
VOMITING: 0
COLOR CHANGE: 0

## 2025-02-17 RX ORDER — FUROSEMIDE 40 MG/1
40 TABLET ORAL DAILY
Qty: 90 TABLET | Refills: 1 | Status: SHIPPED | OUTPATIENT
Start: 2025-02-17

## 2025-02-27 ENCOUNTER — NURSE ONLY (OUTPATIENT)
Dept: CARDIOLOGY CLINIC | Age: 85
End: 2025-02-27

## 2025-02-27 DIAGNOSIS — R06.02 SHORTNESS OF BREATH: ICD-10-CM

## 2025-02-27 DIAGNOSIS — Z95.0 CARDIAC PACEMAKER IN SITU: Primary | ICD-10-CM

## 2025-02-27 DIAGNOSIS — I48.19 PERSISTENT ATRIAL FIBRILLATION (HCC): ICD-10-CM

## 2025-02-28 NOTE — PROGRESS NOTES
[facility]   Electrophysiology  Office Visit  Date: 3/11/2025    Chief Complaint   Patient presents with    Atrial Fibrillation    Tachycardia    Shortness of Breath    Coronary Artery Disease    Bradycardia     Cardiac HX: Harjeet Ivy is an 84 y.o. man with a h/o HTN, HLD, DM, obesity, CKD stage III, chronic dCHF, CAD, s/p CABG (2003), pAF (3/2022), on Eliquis, 2 week CAM (3/2022) 100% AF burden, 3 NSVT, 6 pauses up to 2.8 secs, s/p DCCV to NSR (5/2022), recurrent AF (12/2023), 14 day CAM (12/2023) 100% AF, 11 NSVT, longest 4 beats, intermittent AVB w/ DIETER, SSS, s/p dual ch MDT PPM (1/2024, Dr. Campbell).     Interval History/HPI: Patient is here for follow-up for sick sinus syndrome, chronic persistent AF and PPM management.  Visit today was done in conjunction with the [place] .  Patient was diagnosed with paroxysmal atrial fibrillation in March 2022.  He was placed on Eliquis.  He wore a 2-week CAM that showed 100% AF burden, 3 nonsustained VT episodes and 6 pauses up to 2.8 seconds in length.  He underwent a DCCV to NSR May 2022.  In December 2023 he was back in AF.  He is asymptomatic in atrial fibrillation.  He wore repeat 14-day CAM in December 2023 that showed 100% AF burden, 11 episodes of NSVT with the longest lasting 4 beats and intermittent AV block with junctional escape beats.  He was implanted with a dual-chamber MDT PPM in January 2024.  Today he presents in AF w/ HR 80.  Review of his device today shows 0.8% AP, 93.8% , 100% AF, heart rates appear to be increasing, other parameters stable.  He remains on Eliquis 5 mg twice a day with no issues with bleeding or dark tarry stools.  He remains on Toprol XL 25 mg daily.  He is complaining of shortness of breath.  He states that he has had shortness of breath for at least 10 years however symptoms appear to be worsening.  He is also noticing some lower extremity edema.  He had an echo on 3/6/2025 that showed an EF of 65 to 70% with

## 2025-03-06 ENCOUNTER — HOSPITAL ENCOUNTER (OUTPATIENT)
Age: 85
Discharge: HOME | End: 2025-03-08
Payer: COMMERCIAL

## 2025-03-06 VITALS — WEIGHT: 218 LBS | HEIGHT: 67 IN | BODY MASS INDEX: 34.21 KG/M2

## 2025-03-06 DIAGNOSIS — R06.02 SHORTNESS OF BREATH: ICD-10-CM

## 2025-03-06 DIAGNOSIS — I48.19 PERSISTENT ATRIAL FIBRILLATION (HCC): ICD-10-CM

## 2025-03-06 LAB
ECHO AO ASC DIAM: 3.8 CM
ECHO AO ASCENDING AORTA INDEX: 1.81 CM/M2
ECHO AO ROOT DIAM: 4 CM
ECHO AO ROOT INDEX: 1.9 CM/M2
ECHO AV AREA PEAK VELOCITY: 3.1 CM2
ECHO AV AREA VTI: 3.2 CM2
ECHO AV AREA/BSA PEAK VELOCITY: 1.5 CM2/M2
ECHO AV AREA/BSA VTI: 1.5 CM2/M2
ECHO AV MEAN GRADIENT: 4 MMHG
ECHO AV MEAN VELOCITY: 0.9 M/S
ECHO AV PEAK GRADIENT: 7 MMHG
ECHO AV PEAK VELOCITY: 1.3 M/S
ECHO AV VELOCITY RATIO: 0.69
ECHO AV VTI: 27.5 CM
ECHO BSA: 2.16 M2
ECHO EST RA PRESSURE: 3 MMHG
ECHO IVC EXP: 2.2 CM
ECHO LA AREA 2C: 28 CM2
ECHO LA AREA 4C: 22.3 CM2
ECHO LA MAJOR AXIS: 6.4 CM
ECHO LA MINOR AXIS: 6.1 CM
ECHO LA VOL BP: 82 ML (ref 18–58)
ECHO LA VOL MOD A2C: 105 ML (ref 18–58)
ECHO LA VOL MOD A4C: 62 ML (ref 18–58)
ECHO LA VOL/BSA BIPLANE: 39 ML/M2 (ref 16–34)
ECHO LA VOLUME INDEX MOD A2C: 50 ML/M2 (ref 16–34)
ECHO LA VOLUME INDEX MOD A4C: 30 ML/M2 (ref 16–34)
ECHO LV E' LATERAL VELOCITY: 10.4 CM/S
ECHO LV E' SEPTAL VELOCITY: 4.54 CM/S
ECHO LV EDV 3D: 116 ML
ECHO LV EDV INDEX 3D: 55 ML/M2
ECHO LV EF PHYSICIAN: 67 %
ECHO LV EJECTION FRACTION 3D: 67 %
ECHO LV ESV 3D: 38 ML
ECHO LV ESV INDEX 3D: 18 ML/M2
ECHO LV FRACTIONAL SHORTENING: 31 % (ref 28–44)
ECHO LV INTERNAL DIMENSION DIASTOLE INDEX: 2.14 CM/M2
ECHO LV INTERNAL DIMENSION DIASTOLIC: 4.5 CM (ref 4.2–5.9)
ECHO LV INTERNAL DIMENSION SYSTOLIC INDEX: 1.48 CM/M2
ECHO LV INTERNAL DIMENSION SYSTOLIC: 3.1 CM
ECHO LV IVSD: 1.4 CM (ref 0.6–1)
ECHO LV MASS 2D: 235.3 G (ref 88–224)
ECHO LV MASS 3D INDEX: 85.2 G/M2
ECHO LV MASS 3D: 179 G
ECHO LV MASS INDEX 2D: 112.1 G/M2 (ref 49–115)
ECHO LV POSTERIOR WALL DIASTOLIC: 1.3 CM (ref 0.6–1)
ECHO LV RELATIVE WALL THICKNESS RATIO: 0.58
ECHO LVOT AREA: 4.5 CM2
ECHO LVOT AV VTI INDEX: 0.7
ECHO LVOT DIAM: 2.4 CM
ECHO LVOT MEAN GRADIENT: 2 MMHG
ECHO LVOT PEAK GRADIENT: 3 MMHG
ECHO LVOT PEAK VELOCITY: 0.9 M/S
ECHO LVOT STROKE VOLUME INDEX: 41.3 ML/M2
ECHO LVOT SV: 86.8 ML
ECHO LVOT VTI: 19.2 CM
ECHO MV A VELOCITY: 0.38 M/S
ECHO MV E DECELERATION TIME (DT): 222 MS
ECHO MV E VELOCITY: 0.97 M/S
ECHO MV E/A RATIO: 2.55
ECHO MV E/E' LATERAL: 9.33
ECHO MV E/E' RATIO (AVERAGED): 15.35
ECHO MV E/E' SEPTAL: 21.37
ECHO PULMONARY ARTERY END DIASTOLIC PRESSURE: 8 MMHG
ECHO PV ACCELERATION TIME (AT): 85 MS
ECHO PV MAX VELOCITY: 1.1 M/S
ECHO PV PEAK GRADIENT: 5 MMHG
ECHO PV REGURGITANT MAX VELOCITY: 1.4 M/S
ECHO RA AREA 4C: 22.7 CM2
ECHO RA END SYSTOLIC VOLUME APICAL 4 CHAMBER INDEX BSA: 31 ML/M2
ECHO RA VOLUME: 66 ML
ECHO RIGHT VENTRICULAR SYSTOLIC PRESSURE (RVSP): 41 MMHG
ECHO RV FREE WALL PEAK S': 10.4 CM/S
ECHO RV TAPSE: 1.7 CM (ref 1.7–?)
ECHO TV REGURGITANT MAX VELOCITY: 3.09 M/S
ECHO TV REGURGITANT PEAK GRADIENT: 38 MMHG

## 2025-03-06 PROCEDURE — 93306 TTE W/DOPPLER COMPLETE: CPT

## 2025-03-11 ENCOUNTER — CLINICAL SUPPORT (OUTPATIENT)
Dept: CARDIOLOGY CLINIC | Age: 85
End: 2025-03-11
Payer: COMMERCIAL

## 2025-03-11 ENCOUNTER — ANCILLARY PROCEDURE (OUTPATIENT)
Dept: CARDIOLOGY CLINIC | Age: 85
End: 2025-03-11

## 2025-03-11 ENCOUNTER — OFFICE VISIT (OUTPATIENT)
Dept: CARDIOLOGY CLINIC | Age: 85
End: 2025-03-11
Payer: COMMERCIAL

## 2025-03-11 VITALS
DIASTOLIC BLOOD PRESSURE: 72 MMHG | SYSTOLIC BLOOD PRESSURE: 136 MMHG | BODY MASS INDEX: 33.05 KG/M2 | HEART RATE: 80 BPM | WEIGHT: 211 LBS

## 2025-03-11 DIAGNOSIS — R00.1 BRADYCARDIA: ICD-10-CM

## 2025-03-11 DIAGNOSIS — I48.0 PAROXYSMAL ATRIAL FIBRILLATION (HCC): ICD-10-CM

## 2025-03-11 DIAGNOSIS — I25.118 ATHEROSCLEROSIS OF NATIVE CORONARY ARTERY OF NATIVE HEART WITH OTHER FORM OF ANGINA PECTORIS: ICD-10-CM

## 2025-03-11 DIAGNOSIS — I10 ESSENTIAL HYPERTENSION: ICD-10-CM

## 2025-03-11 DIAGNOSIS — I48.20 CHRONIC A-FIB (HCC): ICD-10-CM

## 2025-03-11 DIAGNOSIS — I49.5 SSS (SICK SINUS SYNDROME) (HCC): Primary | ICD-10-CM

## 2025-03-11 DIAGNOSIS — Z79.01 ON CONTINUOUS ORAL ANTICOAGULATION: ICD-10-CM

## 2025-03-11 DIAGNOSIS — I49.5 TACHY-BRADY SYNDROME (HCC): ICD-10-CM

## 2025-03-11 DIAGNOSIS — Z95.0 CARDIAC PACEMAKER IN SITU: ICD-10-CM

## 2025-03-11 DIAGNOSIS — Z95.0 CARDIAC PACEMAKER IN SITU: Primary | ICD-10-CM

## 2025-03-11 DIAGNOSIS — I20.9 ANGINA PECTORIS: ICD-10-CM

## 2025-03-11 DIAGNOSIS — R06.02 SHORTNESS OF BREATH: ICD-10-CM

## 2025-03-11 DIAGNOSIS — I49.5 SICK SINUS SYNDROME (HCC): ICD-10-CM

## 2025-03-11 PROCEDURE — 93280 PM DEVICE PROGR EVAL DUAL: CPT | Performed by: INTERNAL MEDICINE

## 2025-03-11 PROCEDURE — 93000 ELECTROCARDIOGRAM COMPLETE: CPT | Performed by: NURSE PRACTITIONER

## 2025-03-11 PROCEDURE — 1159F MED LIST DOCD IN RCRD: CPT | Performed by: NURSE PRACTITIONER

## 2025-03-11 PROCEDURE — 3078F DIAST BP <80 MM HG: CPT | Performed by: NURSE PRACTITIONER

## 2025-03-11 PROCEDURE — 99215 OFFICE O/P EST HI 40 MIN: CPT | Performed by: NURSE PRACTITIONER

## 2025-03-11 PROCEDURE — 1160F RVW MEDS BY RX/DR IN RCRD: CPT | Performed by: NURSE PRACTITIONER

## 2025-03-11 PROCEDURE — 3075F SYST BP GE 130 - 139MM HG: CPT | Performed by: NURSE PRACTITIONER

## 2025-03-11 PROCEDURE — 1123F ACP DISCUSS/DSCN MKR DOCD: CPT | Performed by: NURSE PRACTITIONER

## 2025-03-11 RX ORDER — METOPROLOL SUCCINATE 50 MG/1
50 TABLET, EXTENDED RELEASE ORAL DAILY
Qty: 90 TABLET | Refills: 3 | Status: SHIPPED | OUTPATIENT
Start: 2025-03-11

## 2025-03-11 NOTE — NURSING NOTE
Monitor placed by Airam Jack MA     Monitor company [employer]    Length of monitor 3 Days    Monitor ordered by LURDES Villa    Serial number/Patch ID [id number]    Activation successful prior to pt leaving office? Yes

## 2025-03-19 ENCOUNTER — TELEPHONE (OUTPATIENT)
Dept: INTERNAL MEDICINE CLINIC | Age: 85
End: 2025-03-19

## 2025-03-19 DIAGNOSIS — E78.2 MIXED HYPERLIPIDEMIA: ICD-10-CM

## 2025-03-19 DIAGNOSIS — Z00.00 WELLNESS EXAMINATION: Primary | ICD-10-CM

## 2025-03-20 DIAGNOSIS — E78.2 MIXED HYPERLIPIDEMIA: ICD-10-CM

## 2025-03-20 DIAGNOSIS — Z00.00 WELLNESS EXAMINATION: ICD-10-CM

## 2025-03-20 LAB
ALBUMIN SERPL-MCNC: 4.2 G/DL (ref 3.4–5)
ALBUMIN/GLOB SERPL: 2.1 {RATIO} (ref 1.1–2.2)
ALP SERPL-CCNC: 79 U/L (ref 40–129)
ALT SERPL-CCNC: 20 U/L (ref 10–40)
ANION GAP SERPL CALCULATED.3IONS-SCNC: 8 MMOL/L (ref 3–16)
AST SERPL-CCNC: 22 U/L (ref 15–37)
BASOPHILS # BLD: 0 K/UL (ref 0–0.2)
BASOPHILS NFR BLD: 0.4 %
BILIRUB SERPL-MCNC: 0.9 MG/DL (ref 0–1)
BUN SERPL-MCNC: 20 MG/DL (ref 7–20)
CALCIUM SERPL-MCNC: 9.1 MG/DL (ref 8.3–10.6)
CHLORIDE SERPL-SCNC: 105 MMOL/L (ref 99–110)
CHOLEST SERPL-MCNC: 121 MG/DL (ref 0–199)
CO2 SERPL-SCNC: 27 MMOL/L (ref 21–32)
CREAT SERPL-MCNC: 1.4 MG/DL (ref 0.8–1.3)
DEPRECATED RDW RBC AUTO: 17.9 % (ref 12.4–15.4)
EOSINOPHIL # BLD: 0.2 K/UL (ref 0–0.6)
EOSINOPHIL NFR BLD: 3.9 %
GFR SERPLBLD CREATININE-BSD FMLA CKD-EPI: 50 ML/MIN/{1.73_M2}
GLUCOSE SERPL-MCNC: 98 MG/DL (ref 70–99)
HCT VFR BLD AUTO: 39.6 % (ref 40.5–52.5)
HDLC SERPL-MCNC: 41 MG/DL (ref 40–60)
HGB BLD-MCNC: 12.7 G/DL (ref 13.5–17.5)
LDLC SERPL CALC-MCNC: 65 MG/DL
LYMPHOCYTES # BLD: 1.1 K/UL (ref 1–5.1)
LYMPHOCYTES NFR BLD: 18.4 %
MCH RBC QN AUTO: 26.4 PG (ref 26–34)
MCHC RBC AUTO-ENTMCNC: 32.1 G/DL (ref 31–36)
MCV RBC AUTO: 82.2 FL (ref 80–100)
MONOCYTES # BLD: 0.7 K/UL (ref 0–1.3)
MONOCYTES NFR BLD: 12.1 %
NEUTROPHILS # BLD: 4 K/UL (ref 1.7–7.7)
NEUTROPHILS NFR BLD: 65.2 %
PLATELET # BLD AUTO: 197 K/UL (ref 135–450)
PMV BLD AUTO: 8.6 FL (ref 5–10.5)
POTASSIUM SERPL-SCNC: 4.7 MMOL/L (ref 3.5–5.1)
PROT SERPL-MCNC: 6.2 G/DL (ref 6.4–8.2)
RBC # BLD AUTO: 4.81 M/UL (ref 4.2–5.9)
SODIUM SERPL-SCNC: 140 MMOL/L (ref 136–145)
TRIGL SERPL-MCNC: 73 MG/DL (ref 0–150)
VLDLC SERPL CALC-MCNC: 15 MG/DL
WBC # BLD AUTO: 6.1 K/UL (ref 4–11)

## 2025-03-23 ENCOUNTER — RESULTS FOLLOW-UP (OUTPATIENT)
Dept: INTERNAL MEDICINE CLINIC | Age: 85
End: 2025-03-23

## 2025-03-25 ENCOUNTER — RESULTS FOLLOW-UP (OUTPATIENT)
Dept: CARDIOLOGY CLINIC | Age: 85
End: 2025-03-25

## 2025-03-26 SDOH — ECONOMIC STABILITY: FOOD INSECURITY: WITHIN THE PAST 12 MONTHS, THE FOOD YOU BOUGHT JUST DIDN'T LAST AND YOU DIDN'T HAVE MONEY TO GET MORE.: NEVER TRUE

## 2025-03-26 SDOH — ECONOMIC STABILITY: FOOD INSECURITY: WITHIN THE PAST 12 MONTHS, YOU WORRIED THAT YOUR FOOD WOULD RUN OUT BEFORE YOU GOT MONEY TO BUY MORE.: NEVER TRUE

## 2025-03-26 SDOH — ECONOMIC STABILITY: INCOME INSECURITY: IN THE LAST 12 MONTHS, WAS THERE A TIME WHEN YOU WERE NOT ABLE TO PAY THE MORTGAGE OR RENT ON TIME?: NO

## 2025-03-26 SDOH — ECONOMIC STABILITY: TRANSPORTATION INSECURITY
IN THE PAST 12 MONTHS, HAS LACK OF TRANSPORTATION KEPT YOU FROM MEETINGS, WORK, OR FROM GETTING THINGS NEEDED FOR DAILY LIVING?: PATIENT DECLINED

## 2025-03-26 SDOH — HEALTH STABILITY: PHYSICAL HEALTH: ON AVERAGE, HOW MANY MINUTES DO YOU ENGAGE IN EXERCISE AT THIS LEVEL?: 20 MIN

## 2025-03-26 SDOH — ECONOMIC STABILITY: TRANSPORTATION INSECURITY
IN THE PAST 12 MONTHS, HAS THE LACK OF TRANSPORTATION KEPT YOU FROM MEDICAL APPOINTMENTS OR FROM GETTING MEDICATIONS?: PATIENT DECLINED

## 2025-03-26 ASSESSMENT — PATIENT HEALTH QUESTIONNAIRE - PHQ9
SUM OF ALL RESPONSES TO PHQ QUESTIONS 1-9: 0
SUM OF ALL RESPONSES TO PHQ QUESTIONS 1-9: 0
2. FEELING DOWN, DEPRESSED OR HOPELESS: NOT AT ALL
1. LITTLE INTEREST OR PLEASURE IN DOING THINGS: NOT AT ALL
SUM OF ALL RESPONSES TO PHQ QUESTIONS 1-9: 0
SUM OF ALL RESPONSES TO PHQ QUESTIONS 1-9: 0

## 2025-03-26 ASSESSMENT — LIFESTYLE VARIABLES
HOW OFTEN DO YOU HAVE SIX OR MORE DRINKS ON ONE OCCASION: 1
HOW MANY STANDARD DRINKS CONTAINING ALCOHOL DO YOU HAVE ON A TYPICAL DAY: PATIENT DOES NOT DRINK
HOW OFTEN DO YOU HAVE A DRINK CONTAINING ALCOHOL: 1
HOW MANY STANDARD DRINKS CONTAINING ALCOHOL DO YOU HAVE ON A TYPICAL DAY: 0
HOW OFTEN DO YOU HAVE A DRINK CONTAINING ALCOHOL: NEVER

## 2025-03-27 ENCOUNTER — OFFICE VISIT (OUTPATIENT)
Dept: INTERNAL MEDICINE CLINIC | Age: 85
End: 2025-03-27
Payer: COMMERCIAL

## 2025-03-27 VITALS
HEIGHT: 65 IN | TEMPERATURE: 99.1 F | WEIGHT: 214 LBS | OXYGEN SATURATION: 98 % | HEART RATE: 74 BPM | BODY MASS INDEX: 35.65 KG/M2 | DIASTOLIC BLOOD PRESSURE: 68 MMHG | SYSTOLIC BLOOD PRESSURE: 134 MMHG

## 2025-03-27 DIAGNOSIS — M54.42 CHRONIC BILATERAL LOW BACK PAIN WITH LEFT-SIDED SCIATICA: ICD-10-CM

## 2025-03-27 DIAGNOSIS — E78.2 MIXED HYPERLIPIDEMIA: ICD-10-CM

## 2025-03-27 DIAGNOSIS — M1A.9XX0 CHRONIC GOUT WITHOUT TOPHUS, UNSPECIFIED CAUSE, UNSPECIFIED SITE: ICD-10-CM

## 2025-03-27 DIAGNOSIS — F51.01 PRIMARY INSOMNIA: ICD-10-CM

## 2025-03-27 DIAGNOSIS — Z00.00 MEDICARE ANNUAL WELLNESS VISIT, SUBSEQUENT: Primary | ICD-10-CM

## 2025-03-27 DIAGNOSIS — I50.32 CHRONIC DIASTOLIC CONGESTIVE HEART FAILURE (HCC): ICD-10-CM

## 2025-03-27 DIAGNOSIS — N18.31 STAGE 3A CHRONIC KIDNEY DISEASE (HCC): ICD-10-CM

## 2025-03-27 DIAGNOSIS — R73.03 PRE-DIABETES: ICD-10-CM

## 2025-03-27 DIAGNOSIS — I48.0 PAROXYSMAL ATRIAL FIBRILLATION (HCC): ICD-10-CM

## 2025-03-27 DIAGNOSIS — I10 ESSENTIAL HYPERTENSION: ICD-10-CM

## 2025-03-27 DIAGNOSIS — G89.29 CHRONIC BILATERAL LOW BACK PAIN WITH LEFT-SIDED SCIATICA: ICD-10-CM

## 2025-03-27 PROCEDURE — 1159F MED LIST DOCD IN RCRD: CPT | Performed by: HOSPITALIST

## 2025-03-27 PROCEDURE — 3078F DIAST BP <80 MM HG: CPT | Performed by: HOSPITALIST

## 2025-03-27 PROCEDURE — 1123F ACP DISCUSS/DSCN MKR DOCD: CPT | Performed by: HOSPITALIST

## 2025-03-27 PROCEDURE — 3075F SYST BP GE 130 - 139MM HG: CPT | Performed by: HOSPITALIST

## 2025-03-27 PROCEDURE — G0439 PPPS, SUBSEQ VISIT: HCPCS | Performed by: HOSPITALIST

## 2025-03-27 RX ORDER — ISOSORBIDE MONONITRATE 60 MG/1
60 TABLET, EXTENDED RELEASE ORAL 2 TIMES DAILY
Qty: 180 TABLET | Refills: 1 | Status: SHIPPED | OUTPATIENT
Start: 2025-03-27

## 2025-03-27 RX ORDER — HYDROCODONE BITARTRATE AND ACETAMINOPHEN 5; 325 MG/1; MG/1
1 TABLET ORAL 2 TIMES DAILY PRN
Qty: 30 TABLET | Refills: 0 | Status: SHIPPED | OUTPATIENT
Start: 2025-03-27 | End: 2025-04-11

## 2025-03-27 ASSESSMENT — ENCOUNTER SYMPTOMS
SHORTNESS OF BREATH: 1
EYES NEGATIVE: 1
CONSTIPATION: 1

## 2025-03-27 NOTE — PATIENT INSTRUCTIONS

## 2025-03-27 NOTE — PROGRESS NOTES
Medicare Annual Wellness Visit    Harjeet Ivy is here for No chief complaint on file.    Assessment & Plan   Medicare annual wellness visit, subsequent       -     Encouraged the patient to adhere to reduced calorie carb controlled diet and judicious exercise       -     Encouraged him to receive vaccination for shingles, COVID-19, and streptococcal pneumonia    Chronic diastolic congestive heart failure (HCC)  -     isosorbide mononitrate (IMDUR) 60 MG extended release tablet; Take 1 tablet by mouth in the morning and at bedtime, Disp-180 tablet, R-1 Normal   -     Continue regular use of metoprolol succinate 50 mg daily and amlodipine 2.5 mg daily  -     Emphasized and importance of low-salt diet    Chronic bilateral low back pain with left-sided sciatica  -     HYDROcodone-acetaminophen (NORCO) 5-325 MG per tablet; Take 1 tablet by mouth 2 times daily as needed for Pain for up to 15 days. Intended supply: 3 days. Take lowest dose possible to manage pain Max Daily Amount: 2 tablets, Disp-30 tablet, R-0 Normal  -      OARRS was reviewed    Primary insomnia        -     Continue Ambien 10 mg orally nightly as needed        -     OARRS was reviewed    Mixed hyperlipidemia        -     Continue with regular use of atorvastatin 40 mg daily    Essential hypertension        -     Controlled; continue amlodipine 2.5 mg daily and metoprolol succinate 50 mg daily        -     Adherence to low-sodium diet and regular exercise were emphasized    Chronic gout without tophus, unspecified cause, unspecified site        -     Stable; no recent exacerbations        -     Continue allopurinol 300 mg daily and colchicine 0.6 mg daily    Paroxysmal atrial fibrillation (HCC)        -     Currently in sinus rhythm; continue apixaban 5 mg orally twice a day and metoprolol succinate 50 mg daily    Stage 3a chronic kidney disease (HCC)         -     Continue to avoid use of oral NSAIDs and stay well-hydrated    Pre-diabetes         -

## 2025-03-29 DIAGNOSIS — I10 ESSENTIAL HYPERTENSION: ICD-10-CM

## 2025-03-31 RX ORDER — AMLODIPINE BESYLATE 2.5 MG/1
2.5 TABLET ORAL DAILY
Qty: 90 TABLET | Refills: 1 | Status: SHIPPED | OUTPATIENT
Start: 2025-03-31

## 2025-04-03 NOTE — PROGRESS NOTES
[facility]     Outpatient Cardiology         Patient Name:  Harjeet Ivy  Requesting Physician: No admitting provider for patient encounter.  Primary Care Physician: Cornelius Pascual MD    Reason for Consultation/Chief Complaint:   Chief Complaint   Patient presents with    Atrial Fibrillation    Shortness of Breath    Leg Pain     Bilateral, left leg hurts more     CAD/Afib    History of Present Illness:    HPI     Harjeet Pepe a 84 y.o. 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 Campbell), ADRIANNA on CPAP, Tricuspid regurgitation.    Here for follow up for HFpEF, CAD, HTN, HLD, afib, TR.   HFpEF, on lasix 40 mg daily, having some shortness of breath at rest on exertion, discussed about getting a BNP, may need to add Jardiance  CAD, on imdur 60 mg daily, no angina,   HTN, on amlodipine 2.5 mg daily, well-controlled no side effects  HLD, Last LDL 65 (3/20/25), on liptior 40 mg daily, no side effects  Afib, on eliquis 5 mg BID, toprol XL 25 mg daily   Overall doing well from a cardioselective, it is unclear if his shortness of breath is cardiac related        PMH  Past Medical History:   Diagnosis Date    Abnormal ECG     Atrial fibrillation (HCC)     Contact dermatitis and other eczema, due to unspecified cause     Coronary atherosclerosis of unspecified type of vessel, native or graft     Cardio- Dr. Tian Petty    Diastolic CHF (HCC)     Gout, unspecified     Hypertension     Hypertrophy of prostate without urinary obstruction and other lower urinary tract symptoms (LUTS)     MI (myocardial infarction) (HCC)     Other and unspecified hyperlipidemia     Peripheral vascular complications        PSH  Past Surgical History:   Procedure Laterality Date    CARDIAC SURGERY      4 vessel cabg Dr. Anuj chester    CARDIOVERSION  05/02/2022    DR. Valentin    COLONOSCOPY  02/16/2007    Dr. Lino Trinh polyps-repeat 2/2012    COLONOSCOPY  05/23/2013    Gayathri ARREGUIN polyp

## 2025-04-07 ENCOUNTER — OFFICE VISIT (OUTPATIENT)
Dept: CARDIOLOGY CLINIC | Age: 85
End: 2025-04-07
Payer: COMMERCIAL

## 2025-04-07 VITALS
BODY MASS INDEX: 35.35 KG/M2 | DIASTOLIC BLOOD PRESSURE: 50 MMHG | OXYGEN SATURATION: 92 % | WEIGHT: 212.4 LBS | SYSTOLIC BLOOD PRESSURE: 110 MMHG | HEART RATE: 72 BPM

## 2025-04-07 DIAGNOSIS — I50.32 CHRONIC DIASTOLIC CHF (CONGESTIVE HEART FAILURE) (HCC): ICD-10-CM

## 2025-04-07 DIAGNOSIS — I25.118 ATHEROSCLEROSIS OF NATIVE CORONARY ARTERY OF NATIVE HEART WITH OTHER FORM OF ANGINA PECTORIS: ICD-10-CM

## 2025-04-07 DIAGNOSIS — I10 ESSENTIAL HYPERTENSION: ICD-10-CM

## 2025-04-07 DIAGNOSIS — I50.32 CHRONIC DIASTOLIC CHF (CONGESTIVE HEART FAILURE) (HCC): Primary | ICD-10-CM

## 2025-04-07 DIAGNOSIS — Z95.0 CARDIAC PACEMAKER IN SITU: ICD-10-CM

## 2025-04-07 DIAGNOSIS — I48.0 PAROXYSMAL ATRIAL FIBRILLATION (HCC): ICD-10-CM

## 2025-04-07 PROCEDURE — 99214 OFFICE O/P EST MOD 30 MIN: CPT | Performed by: INTERNAL MEDICINE

## 2025-04-07 PROCEDURE — 1159F MED LIST DOCD IN RCRD: CPT | Performed by: INTERNAL MEDICINE

## 2025-04-07 PROCEDURE — 1123F ACP DISCUSS/DSCN MKR DOCD: CPT | Performed by: INTERNAL MEDICINE

## 2025-04-07 PROCEDURE — 3074F SYST BP LT 130 MM HG: CPT | Performed by: INTERNAL MEDICINE

## 2025-04-07 PROCEDURE — 3078F DIAST BP <80 MM HG: CPT | Performed by: INTERNAL MEDICINE

## 2025-04-07 PROCEDURE — G2211 COMPLEX E/M VISIT ADD ON: HCPCS | Performed by: INTERNAL MEDICINE

## 2025-04-07 RX ORDER — TERBINAFINE HYDROCHLORIDE 250 MG/1
250 TABLET ORAL DAILY
COMMUNITY

## 2025-04-07 RX ORDER — ZOLPIDEM TARTRATE 10 MG/1
10 TABLET ORAL NIGHTLY PRN
COMMUNITY

## 2025-04-08 ENCOUNTER — RESULTS FOLLOW-UP (OUTPATIENT)
Dept: CARDIOLOGY | Age: 85
End: 2025-04-08

## 2025-04-08 LAB — NT-PROBNP SERPL-MCNC: 575 PG/ML (ref 0–449)

## 2025-05-15 DIAGNOSIS — M1A.9XX0 CHRONIC GOUT WITHOUT TOPHUS, UNSPECIFIED CAUSE, UNSPECIFIED SITE: ICD-10-CM

## 2025-05-16 RX ORDER — ALLOPURINOL 300 MG/1
300 TABLET ORAL DAILY
Qty: 90 TABLET | Refills: 1 | Status: SHIPPED | OUTPATIENT
Start: 2025-05-16

## 2025-05-27 ENCOUNTER — HOSPITAL ENCOUNTER (OUTPATIENT)
Dept: NUCLEAR MEDICINE | Age: 85
Discharge: HOME | End: 2025-05-27
Payer: COMMERCIAL

## 2025-05-27 ENCOUNTER — HOSPITAL ENCOUNTER (OUTPATIENT)
Age: 85
Discharge: HOME | End: 2025-05-29
Payer: COMMERCIAL

## 2025-05-27 DIAGNOSIS — I25.118 ATHEROSCLEROSIS OF NATIVE CORONARY ARTERY OF NATIVE HEART WITH OTHER FORM OF ANGINA PECTORIS: ICD-10-CM

## 2025-05-27 DIAGNOSIS — I20.9 ANGINA PECTORIS: ICD-10-CM

## 2025-05-27 DIAGNOSIS — R06.02 SHORTNESS OF BREATH: ICD-10-CM

## 2025-05-27 DIAGNOSIS — I49.5 SSS (SICK SINUS SYNDROME) (HCC): ICD-10-CM

## 2025-05-27 DIAGNOSIS — I49.5 TACHY-BRADY SYNDROME (HCC): ICD-10-CM

## 2025-05-27 DIAGNOSIS — I48.20 CHRONIC A-FIB (HCC): ICD-10-CM

## 2025-05-27 DIAGNOSIS — Z79.01 ON CONTINUOUS ORAL ANTICOAGULATION: ICD-10-CM

## 2025-05-27 LAB
NUC STRESS EJECTION FRACTION: 68 %
NUC STRESS LV EDV: 117 ML (ref 67–155)
NUC STRESS LV ESV: 37 ML (ref 22–58)
NUC STRESS LV MASS: 144 G
NUC STRESS LV STROKE VOLUME: 80 ML
STRESS BASELINE DIAS BP: 80 MMHG
STRESS BASELINE HR: 74 BPM
STRESS BASELINE SYS BP: 166 MMHG
STRESS ESTIMATED WORKLOAD: 1 METS
STRESS EXERCISE DUR MIN: 1 MIN
STRESS EXERCISE DUR SEC: 0 SEC
STRESS PEAK DIAS BP: 61 MMHG
STRESS PEAK SYS BP: 174 MMHG
STRESS PERCENT HR ACHIEVED: 57 %
STRESS POST PEAK HR: 78 BPM
STRESS RATE PRESSURE PRODUCT: NORMAL BPM*MMHG
STRESS ST DEPRESSION: 0 MM
STRESS TARGET HR: 137 BPM

## 2025-05-27 PROCEDURE — 93018 CV STRESS TEST I&R ONLY: CPT | Performed by: INTERNAL MEDICINE

## 2025-05-27 PROCEDURE — A9502 TC99M TETROFOSMIN: HCPCS | Performed by: NURSE PRACTITIONER

## 2025-05-27 PROCEDURE — 93016 CV STRESS TEST SUPVJ ONLY: CPT | Performed by: INTERNAL MEDICINE

## 2025-05-27 PROCEDURE — 78452 HT MUSCLE IMAGE SPECT MULT: CPT | Performed by: INTERNAL MEDICINE

## 2025-05-27 PROCEDURE — 78452 HT MUSCLE IMAGE SPECT MULT: CPT

## 2025-05-27 PROCEDURE — 6360000002 HC RX W HCPCS: Performed by: NURSE PRACTITIONER

## 2025-05-27 PROCEDURE — 3430000000 HC RX DIAGNOSTIC RADIOPHARMACEUTICAL: Performed by: NURSE PRACTITIONER

## 2025-05-27 PROCEDURE — 93017 CV STRESS TEST TRACING ONLY: CPT

## 2025-05-27 RX ORDER — REGADENOSON 0.08 MG/ML
0.4 INJECTION, SOLUTION INTRAVENOUS
Status: COMPLETED | OUTPATIENT
Start: 2025-05-27 | End: 2025-05-27

## 2025-05-27 RX ADMIN — TETROFOSMIN 35.2 MILLICURIE: 1.38 INJECTION, POWDER, LYOPHILIZED, FOR SOLUTION INTRAVENOUS at 10:25

## 2025-05-27 RX ADMIN — TETROFOSMIN 10 MILLICURIE: 1.38 INJECTION, POWDER, LYOPHILIZED, FOR SOLUTION INTRAVENOUS at 09:25

## 2025-05-27 RX ADMIN — REGADENOSON 0.4 MG: 0.08 INJECTION, SOLUTION INTRAVENOUS at 10:25

## 2025-06-04 DIAGNOSIS — I48.20 CHRONIC A-FIB (HCC): ICD-10-CM

## 2025-06-04 DIAGNOSIS — I49.5 SSS (SICK SINUS SYNDROME) (HCC): ICD-10-CM

## 2025-06-04 DIAGNOSIS — Z79.01 ON CONTINUOUS ORAL ANTICOAGULATION: ICD-10-CM

## 2025-06-04 DIAGNOSIS — I25.118 ATHEROSCLEROSIS OF NATIVE CORONARY ARTERY OF NATIVE HEART WITH OTHER FORM OF ANGINA PECTORIS: ICD-10-CM

## 2025-06-04 DIAGNOSIS — I49.5 TACHY-BRADY SYNDROME (HCC): ICD-10-CM

## 2025-06-04 RX ORDER — METOPROLOL SUCCINATE 50 MG/1
50 TABLET, EXTENDED RELEASE ORAL DAILY
Qty: 90 TABLET | Refills: 3 | Status: SHIPPED | OUTPATIENT
Start: 2025-06-04

## 2025-06-04 NOTE — TELEPHONE ENCOUNTER
Requested Prescriptions     Pending Prescriptions Disp Refills    metoprolol succinate (TOPROL XL) 50 MG extended release tablet 90 tablet 3     Sig: Take 1 tablet by mouth daily            Checked Correct Pharmacy: Yes    Any changes since last refill? No     Number: 90  Refills: 3    Last OV: 4/7/2025 Provider: SHIVAM    Next OV: 9/16/2025 Provider: NED        Medication List  As of 4/7/2025  1:36 PM    Albuterol Sulfate      2.5 mg Nebulization EVERY 4 HOURS PRN    108 (90 Base) MCG/ACT Aers, 2 puffs Inhalation 4 TIMES DAILY PRN    Allopurinol 300 MG TAKE 1 TABLET BY MOUTH DAILY    amLODIPine Besylate 2.5 mg Oral DAILY    Apixaban 5 mg Oral 2 TIMES DAILY    Aspirin 81 mg Oral DAILY    Atorvastatin Calcium 40 mg Oral DAILY    Cholecalciferol 1000 UNIT 2,000 Int'l Units DAILY    Coenzyme Q10 Take by mouth    Colchicine 0.6 mg Oral DAILY    Famotidine 20 mg Oral 2 TIMES DAILY    Furosemide 40 mg Oral DAILY    HYDROcodone-Acetaminophen 5-325 MG 1 tablet Oral 2 TIMES DAILY PRN, Intended supply: 3 days. Take lowest dose possible to manage pain    Isosorbide Mononitrate 60 mg Oral 2 times daily    Metoprolol Succinate 50 mg Oral DAILY    Mirabegron 50 mg DAILY    Nitroglycerin 0.4 mg SubLINGual EVERY 5 MIN PRN, up to max of 3 total doses. If no relief after 1 dose, call 911.    Terbinafine HCl 250 mg DAILY    Zolpidem Tartrate 10 mg NIGHTLY PRN

## 2025-06-11 ENCOUNTER — TELEPHONE (OUTPATIENT)
Dept: CARDIOLOGY CLINIC | Age: 85
End: 2025-06-11

## 2025-06-11 NOTE — TELEPHONE ENCOUNTER
Medication refill:    Medication  metoprolol succinate (TOPROL XL) 50 MG extended release tablet [[id number]]  metoprolol succinate (TOPROL XL) 50 MG extended release tablet [[id number]]    Order Details  Dose: 50 mg Route: Oral Frequency: DAILY   Dispense Quantity: 90 tablet Refills: 3          Sig: Take 1 tablet by mouth daily     Pharmacy  [id number] - [place], [place] - [address] - P [phone] - F [phone] [id number] [id number]  Phone: [phone]  Fax: [phone]

## 2025-06-26 ENCOUNTER — OFFICE VISIT (OUTPATIENT)
Dept: INTERNAL MEDICINE CLINIC | Age: 85
End: 2025-06-26
Payer: COMMERCIAL

## 2025-06-26 VITALS
WEIGHT: 210 LBS | BODY MASS INDEX: 34.99 KG/M2 | SYSTOLIC BLOOD PRESSURE: 124 MMHG | HEIGHT: 65 IN | DIASTOLIC BLOOD PRESSURE: 70 MMHG

## 2025-06-26 DIAGNOSIS — M54.42 CHRONIC BILATERAL LOW BACK PAIN WITH LEFT-SIDED SCIATICA: ICD-10-CM

## 2025-06-26 DIAGNOSIS — E78.2 MIXED HYPERLIPIDEMIA: ICD-10-CM

## 2025-06-26 DIAGNOSIS — I20.89 STABLE ANGINA PECTORIS: ICD-10-CM

## 2025-06-26 DIAGNOSIS — M1A.9XX0 CHRONIC GOUT WITHOUT TOPHUS, UNSPECIFIED CAUSE, UNSPECIFIED SITE: ICD-10-CM

## 2025-06-26 DIAGNOSIS — I10 ESSENTIAL HYPERTENSION: Primary | ICD-10-CM

## 2025-06-26 DIAGNOSIS — N18.31 STAGE 3A CHRONIC KIDNEY DISEASE (HCC): ICD-10-CM

## 2025-06-26 DIAGNOSIS — F51.01 PRIMARY INSOMNIA: ICD-10-CM

## 2025-06-26 DIAGNOSIS — I50.32 CHRONIC DIASTOLIC CONGESTIVE HEART FAILURE (HCC): ICD-10-CM

## 2025-06-26 DIAGNOSIS — I48.0 PAROXYSMAL ATRIAL FIBRILLATION (HCC): ICD-10-CM

## 2025-06-26 DIAGNOSIS — G89.29 CHRONIC BILATERAL LOW BACK PAIN WITH LEFT-SIDED SCIATICA: ICD-10-CM

## 2025-06-26 DIAGNOSIS — R73.03 PRE-DIABETES: ICD-10-CM

## 2025-06-26 DIAGNOSIS — Z12.5 PROSTATE CANCER SCREENING: ICD-10-CM

## 2025-06-26 PROCEDURE — 1123F ACP DISCUSS/DSCN MKR DOCD: CPT | Performed by: HOSPITALIST

## 2025-06-26 PROCEDURE — 3078F DIAST BP <80 MM HG: CPT | Performed by: HOSPITALIST

## 2025-06-26 PROCEDURE — 99214 OFFICE O/P EST MOD 30 MIN: CPT | Performed by: HOSPITALIST

## 2025-06-26 PROCEDURE — 1159F MED LIST DOCD IN RCRD: CPT | Performed by: HOSPITALIST

## 2025-06-26 PROCEDURE — 3074F SYST BP LT 130 MM HG: CPT | Performed by: HOSPITALIST

## 2025-06-26 RX ORDER — METOPROLOL SUCCINATE 25 MG/1
25 TABLET, EXTENDED RELEASE ORAL DAILY
COMMUNITY

## 2025-06-26 RX ORDER — NITROGLYCERIN 0.4 MG/1
0.4 TABLET SUBLINGUAL EVERY 5 MIN PRN
Qty: 25 TABLET | Refills: 1 | Status: SHIPPED | OUTPATIENT
Start: 2025-06-26

## 2025-06-26 SDOH — ECONOMIC STABILITY: FOOD INSECURITY: WITHIN THE PAST 12 MONTHS, YOU WORRIED THAT YOUR FOOD WOULD RUN OUT BEFORE YOU GOT MONEY TO BUY MORE.: NEVER TRUE

## 2025-06-26 SDOH — ECONOMIC STABILITY: FOOD INSECURITY: WITHIN THE PAST 12 MONTHS, THE FOOD YOU BOUGHT JUST DIDN'T LAST AND YOU DIDN'T HAVE MONEY TO GET MORE.: NEVER TRUE

## 2025-06-26 NOTE — PROGRESS NOTES
Follow Up Visit Established Patient Visit    Patient:  Harjeet Ivy                                               : 1940  Age: 84 y.o.  MRN: [id number]  Date : 2025    Harjeet Ivy is a 84 y.o. male who presents for :  follow up appointment    Chief Complaint   Patient presents with    Hypertension     Doing well overall. Doesn't feel depressed. No recent falls.  + some bilateral ear fullness.  Checks his BP twice/ day; SBP is usually under 130 mm Hg.  No recent heart palpitations.  + dyspnea with moderate exertion.  + some blateral distal foot numbness.  + chronic bilateral lower back pain.  No recent gout attack.  Tolerates Eliquis well.    Past Medical History:   Diagnosis Date    Abnormal ECG     Atrial fibrillation (HCC)     Contact dermatitis and other eczema, due to unspecified cause     Coronary atherosclerosis of unspecified type of vessel, native or graft     Cardio- Dr. Tian Petty    Diastolic CHF (HCC)     Gout, unspecified     Hypertension     Hypertrophy of prostate without urinary obstruction and other lower urinary tract symptoms (LUTS)     MI (myocardial infarction) (HCC)     Other and unspecified hyperlipidemia     Peripheral vascular complications        Past Surgical History:   Procedure Laterality Date    CARDIAC SURGERY      4 vessel cabg Dr. Anuj chester    CARDIOVERSION  2022    DR. Valentin    COLONOSCOPY  2007    Dr. Lino Trinh polyps-repeat 2012    COLONOSCOPY  2013    Gayathri ARREGUIN polyp repeat 2018    COLONOSCOPY  2018    Dr. Lino Trinh polyp removed no repeat necessary    CORONARY ARTERY BYPASS GRAFT      Dr. Worthy    [facility]    an[facility] scalp - Dr. Soy Joseph    PACEMAKER INSERTION  2024    Dr. Valentin       Current Outpatient Medications   Medication Sig Dispense Refill    metoprolol succinate (TOPROL XL) 25 MG extended release tablet Take 1 tablet by mouth daily      nitroGLYCERIN (NITROSTAT) 0.4 MG SL tablet Place 1

## 2025-07-12 DIAGNOSIS — I10 ESSENTIAL HYPERTENSION: ICD-10-CM

## 2025-07-12 DIAGNOSIS — I50.32 CHRONIC DIASTOLIC CONGESTIVE HEART FAILURE (HCC): ICD-10-CM

## 2025-07-14 RX ORDER — AMLODIPINE BESYLATE 2.5 MG/1
2.5 TABLET ORAL DAILY
Qty: 90 TABLET | Refills: 1 | Status: SHIPPED | OUTPATIENT
Start: 2025-07-14

## 2025-07-14 RX ORDER — ISOSORBIDE MONONITRATE 60 MG/1
60 TABLET, EXTENDED RELEASE ORAL 2 TIMES DAILY
Qty: 180 TABLET | Refills: 1 | Status: SHIPPED | OUTPATIENT
Start: 2025-07-14

## 2025-08-14 RX ORDER — FUROSEMIDE 40 MG/1
40 TABLET ORAL DAILY
Qty: 90 TABLET | Refills: 1 | Status: SHIPPED | OUTPATIENT
Start: 2025-08-14